# Patient Record
Sex: FEMALE | Race: WHITE | NOT HISPANIC OR LATINO
[De-identification: names, ages, dates, MRNs, and addresses within clinical notes are randomized per-mention and may not be internally consistent; named-entity substitution may affect disease eponyms.]

---

## 2017-01-05 ENCOUNTER — APPOINTMENT (OUTPATIENT)
Dept: PULMONOLOGY | Facility: CLINIC | Age: 42
End: 2017-01-05

## 2017-01-05 VITALS
RESPIRATION RATE: 17 BRPM | WEIGHT: 155 LBS | SYSTOLIC BLOOD PRESSURE: 110 MMHG | HEIGHT: 63 IN | OXYGEN SATURATION: 98 % | DIASTOLIC BLOOD PRESSURE: 60 MMHG | BODY MASS INDEX: 27.46 KG/M2 | TEMPERATURE: 97.7 F | HEART RATE: 94 BPM

## 2017-01-09 ENCOUNTER — RX RENEWAL (OUTPATIENT)
Age: 42
End: 2017-01-09

## 2017-01-09 ENCOUNTER — MEDICATION RENEWAL (OUTPATIENT)
Age: 42
End: 2017-01-09

## 2017-01-13 LAB — BACTERIA SPT CF RESP CULT: ABNORMAL

## 2017-01-23 ENCOUNTER — OTHER (OUTPATIENT)
Age: 42
End: 2017-01-23

## 2017-01-30 LAB — CFTR MUT TESTED BLD/T: NORMAL

## 2017-02-14 ENCOUNTER — MEDICATION RENEWAL (OUTPATIENT)
Age: 42
End: 2017-02-14

## 2017-02-20 LAB — ACID FAST STN SPT: NORMAL

## 2017-06-05 ENCOUNTER — RX RENEWAL (OUTPATIENT)
Age: 42
End: 2017-06-05

## 2017-06-29 ENCOUNTER — APPOINTMENT (OUTPATIENT)
Dept: PULMONOLOGY | Facility: CLINIC | Age: 42
End: 2017-06-29

## 2017-06-29 VITALS
BODY MASS INDEX: 27.29 KG/M2 | HEART RATE: 75 BPM | HEIGHT: 63 IN | SYSTOLIC BLOOD PRESSURE: 100 MMHG | WEIGHT: 154 LBS | DIASTOLIC BLOOD PRESSURE: 68 MMHG | RESPIRATION RATE: 14 BRPM | OXYGEN SATURATION: 98 % | TEMPERATURE: 98.5 F

## 2017-07-05 ENCOUNTER — OTHER (OUTPATIENT)
Age: 42
End: 2017-07-05

## 2017-07-06 ENCOUNTER — RESULT REVIEW (OUTPATIENT)
Age: 42
End: 2017-07-06

## 2017-07-06 LAB — BACTERIA SPT CF RESP CULT: ABNORMAL

## 2017-07-10 ENCOUNTER — OUTPATIENT (OUTPATIENT)
Dept: OUTPATIENT SERVICES | Facility: HOSPITAL | Age: 42
LOS: 1 days | End: 2017-07-10
Payer: COMMERCIAL

## 2017-07-10 ENCOUNTER — APPOINTMENT (OUTPATIENT)
Dept: CT IMAGING | Facility: IMAGING CENTER | Age: 42
End: 2017-07-10

## 2017-07-10 ENCOUNTER — APPOINTMENT (OUTPATIENT)
Dept: RADIOLOGY | Facility: IMAGING CENTER | Age: 42
End: 2017-07-10

## 2017-07-10 DIAGNOSIS — E84.0 CYSTIC FIBROSIS WITH PULMONARY MANIFESTATIONS: ICD-10-CM

## 2017-07-10 DIAGNOSIS — E84.9 CYSTIC FIBROSIS, UNSPECIFIED: ICD-10-CM

## 2017-07-10 PROCEDURE — 77080 DXA BONE DENSITY AXIAL: CPT | Mod: 26

## 2017-07-10 PROCEDURE — 71250 CT THORAX DX C-: CPT | Mod: 26

## 2017-07-11 LAB
GLUCOSE 1H P 100 G GLC PO SERPL-MCNC: 79 MG/DL
GLUCOSE 2H P 100 G GLC PO SERPL-MCNC: 102 MG/DL
GLUCOSE BS SERPL-MCNC: 88 MG/DL

## 2017-07-14 DIAGNOSIS — M85.89 OTHER SPECIFIED DISORDERS OF BONE DENSITY AND STRUCTURE, MULTIPLE SITES: ICD-10-CM

## 2017-07-14 DIAGNOSIS — J47.9 BRONCHIECTASIS, UNCOMPLICATED: ICD-10-CM

## 2017-07-27 ENCOUNTER — APPOINTMENT (OUTPATIENT)
Dept: PULMONOLOGY | Facility: CLINIC | Age: 42
End: 2017-07-27
Payer: COMMERCIAL

## 2017-07-27 VITALS
TEMPERATURE: 97.4 F | WEIGHT: 153 LBS | HEIGHT: 63 IN | DIASTOLIC BLOOD PRESSURE: 70 MMHG | RESPIRATION RATE: 16 BRPM | OXYGEN SATURATION: 98 % | BODY MASS INDEX: 27.11 KG/M2 | HEART RATE: 81 BPM | SYSTOLIC BLOOD PRESSURE: 110 MMHG

## 2017-07-27 PROCEDURE — 99215 OFFICE O/P EST HI 40 MIN: CPT | Mod: 25

## 2017-07-27 PROCEDURE — 94010 BREATHING CAPACITY TEST: CPT

## 2017-07-27 PROCEDURE — ZZZZZ: CPT

## 2017-07-28 LAB
ALBUMIN SERPL ELPH-MCNC: 4.2 G/DL
ALP BLD-CCNC: 69 U/L
ALT SERPL-CCNC: 8 U/L
ANION GAP SERPL CALC-SCNC: 15 MMOL/L
AST SERPL-CCNC: 12 U/L
BILIRUB SERPL-MCNC: 0.2 MG/DL
BUN SERPL-MCNC: 13 MG/DL
CALCIUM SERPL-MCNC: 9.9 MG/DL
CHLORIDE SERPL-SCNC: 104 MMOL/L
CO2 SERPL-SCNC: 22 MMOL/L
CREAT SERPL-MCNC: 0.62 MG/DL
GLUCOSE SERPL-MCNC: 105 MG/DL
POTASSIUM SERPL-SCNC: 4.2 MMOL/L
PROT SERPL-MCNC: 8.3 G/DL
SODIUM SERPL-SCNC: 141 MMOL/L

## 2017-07-31 ENCOUNTER — MEDICATION RENEWAL (OUTPATIENT)
Age: 42
End: 2017-07-31

## 2017-08-21 ENCOUNTER — OTHER (OUTPATIENT)
Age: 42
End: 2017-08-21

## 2017-08-21 LAB — ACID FAST STN SPT: NORMAL

## 2017-08-28 ENCOUNTER — MEDICATION RENEWAL (OUTPATIENT)
Age: 42
End: 2017-08-28

## 2017-08-31 ENCOUNTER — MEDICATION RENEWAL (OUTPATIENT)
Age: 42
End: 2017-08-31

## 2017-09-07 ENCOUNTER — RESULT REVIEW (OUTPATIENT)
Age: 42
End: 2017-09-07

## 2017-09-26 ENCOUNTER — APPOINTMENT (OUTPATIENT)
Dept: PULMONOLOGY | Facility: CLINIC | Age: 42
End: 2017-09-26
Payer: COMMERCIAL

## 2017-09-26 ENCOUNTER — LABORATORY RESULT (OUTPATIENT)
Age: 42
End: 2017-09-26

## 2017-09-26 VITALS
OXYGEN SATURATION: 96 % | BODY MASS INDEX: 26.58 KG/M2 | SYSTOLIC BLOOD PRESSURE: 110 MMHG | TEMPERATURE: 96.7 F | RESPIRATION RATE: 16 BRPM | DIASTOLIC BLOOD PRESSURE: 72 MMHG | HEART RATE: 72 BPM | WEIGHT: 150 LBS | HEIGHT: 63 IN

## 2017-09-26 DIAGNOSIS — Z57.9 OCCUPATIONAL EXPOSURE TO UNSPECIFIED RISK FACTOR: ICD-10-CM

## 2017-09-26 PROCEDURE — 99215 OFFICE O/P EST HI 40 MIN: CPT

## 2017-09-26 SDOH — HEALTH STABILITY - PHYSICAL HEALTH: OCCUPATIONAL EXPOSURE TO UNSPECIFIED RISK FACTOR: Z57.9

## 2017-09-27 PROBLEM — Z57.9 OCCUPATIONAL EXPOSURE IN WORKPLACE: Status: ACTIVE | Noted: 2017-07-27

## 2017-10-06 ENCOUNTER — APPOINTMENT (OUTPATIENT)
Dept: CT IMAGING | Facility: IMAGING CENTER | Age: 42
End: 2017-10-06
Payer: COMMERCIAL

## 2017-10-06 ENCOUNTER — OUTPATIENT (OUTPATIENT)
Dept: OUTPATIENT SERVICES | Facility: HOSPITAL | Age: 42
LOS: 1 days | End: 2017-10-06
Payer: COMMERCIAL

## 2017-10-06 DIAGNOSIS — J32.9 CHRONIC SINUSITIS, UNSPECIFIED: ICD-10-CM

## 2017-10-06 PROCEDURE — 70486 CT MAXILLOFACIAL W/O DYE: CPT | Mod: 26

## 2017-10-10 ENCOUNTER — RESULT REVIEW (OUTPATIENT)
Age: 42
End: 2017-10-10

## 2017-10-10 LAB
25(OH)D3 SERPL-MCNC: 48.6 NG/ML
A FLAVUS AB FLD QL: NEGATIVE
A FUMIGATUS AB FLD QL: NEGATIVE
A FUMIGATUS IGE QN: 9.05 KUA/L
A NIGER AB FLD QL: NEGATIVE
A-TOCOPHEROL VIT E SERPL-MCNC: 11.5 MG/L
ALBUMIN SERPL ELPH-MCNC: 4.4 G/DL
ALP BLD-CCNC: 76 U/L
ALT SERPL-CCNC: <4 U/L
ANION GAP SERPL CALC-SCNC: 16 MMOL/L
ASPERGILLUS FLAVUS PRECIPITINS: NEGATIVE
ASPERGILLUS FUMIGATES PRECIPTINS: NEGATIVE
ASPERGILLUS NIGER PRECIPITINS: NEGATIVE
AST SERPL-CCNC: 15 U/L
BACTERIA SPT CF RESP CULT: ABNORMAL
BASOPHILS # BLD AUTO: 0.04 K/UL
BASOPHILS NFR BLD AUTO: 0.5 %
BETA+GAMMA TOCOPHEROL SERPL-MCNC: 1.7 MG/L
BILIRUB SERPL-MCNC: 0.3 MG/DL
BUN SERPL-MCNC: 7 MG/DL
CALCIUM SERPL-MCNC: 9 MG/DL
CHLORIDE SERPL-SCNC: 101 MMOL/L
CO2 SERPL-SCNC: 23 MMOL/L
CREAT SERPL-MCNC: 0.48 MG/DL
DEPRECATED A FUMIGATUS IGE RAST QL: ABNORMAL
EOSINOPHIL # BLD AUTO: 0.36 K/UL
EOSINOPHIL NFR BLD AUTO: 4.3 %
GLUCOSE SERPL-MCNC: 70 MG/DL
HBA1C MFR BLD HPLC: 5.4 %
HCT VFR BLD CALC: 32.1 %
HGB BLD-MCNC: 9.8 G/DL
IMM GRANULOCYTES NFR BLD AUTO: 0.2 %
LYMPHOCYTES # BLD AUTO: 1.08 K/UL
LYMPHOCYTES NFR BLD AUTO: 12.8 %
MAN DIFF?: NORMAL
MCHC RBC-ENTMCNC: 26 PG
MCHC RBC-ENTMCNC: 30.5 GM/DL
MCV RBC AUTO: 85.1 FL
MENADIONE SERPL-MCNC: 0.44 NG/ML
MONOCYTES # BLD AUTO: 0.72 K/UL
MONOCYTES NFR BLD AUTO: 8.5 %
NEUTROPHILS # BLD AUTO: 6.25 K/UL
NEUTROPHILS NFR BLD AUTO: 73.7 %
PLATELET # BLD AUTO: 426 K/UL
POTASSIUM SERPL-SCNC: 4.1 MMOL/L
PROT SERPL-MCNC: 8.6 G/DL
RBC # BLD: 3.77 M/UL
RBC # FLD: 16 %
SODIUM SERPL-SCNC: 140 MMOL/L
TOTAL IGE SMQN RAST: 238 KU/L
VIT A SERPL-MCNC: 29 UG/DL
WBC # FLD AUTO: 8.47 K/UL

## 2017-10-18 ENCOUNTER — MEDICATION RENEWAL (OUTPATIENT)
Age: 42
End: 2017-10-18

## 2017-10-19 ENCOUNTER — MEDICATION RENEWAL (OUTPATIENT)
Age: 42
End: 2017-10-19

## 2017-10-27 ENCOUNTER — MEDICATION RENEWAL (OUTPATIENT)
Age: 42
End: 2017-10-27

## 2017-10-30 ENCOUNTER — APPOINTMENT (OUTPATIENT)
Dept: PULMONOLOGY | Facility: CLINIC | Age: 42
End: 2017-10-30
Payer: COMMERCIAL

## 2017-10-30 PROCEDURE — 90686 IIV4 VACC NO PRSV 0.5 ML IM: CPT

## 2017-10-30 PROCEDURE — G0008: CPT

## 2017-11-03 ENCOUNTER — APPOINTMENT (OUTPATIENT)
Dept: OTOLARYNGOLOGY | Facility: CLINIC | Age: 42
End: 2017-11-03
Payer: COMMERCIAL

## 2017-11-03 VITALS
BODY MASS INDEX: 25.61 KG/M2 | HEART RATE: 67 BPM | DIASTOLIC BLOOD PRESSURE: 75 MMHG | WEIGHT: 150 LBS | SYSTOLIC BLOOD PRESSURE: 116 MMHG | HEIGHT: 64 IN

## 2017-11-03 PROCEDURE — 99244 OFF/OP CNSLTJ NEW/EST MOD 40: CPT | Mod: 25

## 2017-11-03 PROCEDURE — 31231 NASAL ENDOSCOPY DX: CPT

## 2017-11-03 RX ORDER — SULFAMETHOXAZOLE AND TRIMETHOPRIM 800; 160 MG/1; MG/1
800-160 TABLET ORAL
Qty: 42 | Refills: 0 | Status: DISCONTINUED | COMMUNITY
Start: 2017-06-29 | End: 2017-11-03

## 2017-11-03 RX ORDER — CIPROFLOXACIN HYDROCHLORIDE 750 MG/1
750 TABLET, FILM COATED ORAL
Qty: 28 | Refills: 0 | Status: DISCONTINUED | COMMUNITY
Start: 2017-01-05 | End: 2017-11-03

## 2017-12-05 ENCOUNTER — APPOINTMENT (OUTPATIENT)
Dept: PULMONOLOGY | Facility: CLINIC | Age: 42
End: 2017-12-05

## 2017-12-27 ENCOUNTER — MEDICATION RENEWAL (OUTPATIENT)
Age: 42
End: 2017-12-27

## 2017-12-28 ENCOUNTER — MEDICATION RENEWAL (OUTPATIENT)
Age: 42
End: 2017-12-28

## 2018-01-17 ENCOUNTER — MEDICATION RENEWAL (OUTPATIENT)
Age: 43
End: 2018-01-17

## 2018-02-26 ENCOUNTER — OTHER (OUTPATIENT)
Age: 43
End: 2018-02-26

## 2018-02-26 LAB — ACID FAST STN SPT: NORMAL

## 2018-03-05 ENCOUNTER — RX RENEWAL (OUTPATIENT)
Age: 43
End: 2018-03-05

## 2018-03-27 ENCOUNTER — APPOINTMENT (OUTPATIENT)
Dept: PULMONOLOGY | Facility: CLINIC | Age: 43
End: 2018-03-27
Payer: COMMERCIAL

## 2018-03-27 VITALS
OXYGEN SATURATION: 95 % | SYSTOLIC BLOOD PRESSURE: 110 MMHG | BODY MASS INDEX: 27.31 KG/M2 | WEIGHT: 160 LBS | TEMPERATURE: 98.5 F | RESPIRATION RATE: 18 BRPM | HEART RATE: 78 BPM | DIASTOLIC BLOOD PRESSURE: 70 MMHG | HEIGHT: 64 IN

## 2018-03-27 PROCEDURE — ZZZZZ: CPT

## 2018-03-27 PROCEDURE — 99215 OFFICE O/P EST HI 40 MIN: CPT | Mod: 25

## 2018-03-27 PROCEDURE — 94010 BREATHING CAPACITY TEST: CPT

## 2018-03-27 RX ORDER — PREDNISONE 20 MG/1
20 TABLET ORAL
Qty: 14 | Refills: 0 | Status: DISCONTINUED | COMMUNITY
Start: 2017-06-29 | End: 2018-03-27

## 2018-03-27 RX ORDER — PREDNISONE 20 MG/1
20 TABLET ORAL DAILY
Qty: 30 | Refills: 0 | Status: DISCONTINUED | COMMUNITY
Start: 2017-01-05 | End: 2018-03-27

## 2018-03-27 RX ORDER — SULFAMETHOXAZOLE AND TRIMETHOPRIM 800; 160 MG/1; MG/1
800-160 TABLET ORAL
Qty: 42 | Refills: 0 | Status: DISCONTINUED | COMMUNITY
Start: 2017-09-26 | End: 2018-03-27

## 2018-03-27 RX ORDER — PREDNISONE 20 MG/1
20 TABLET ORAL DAILY
Qty: 5 | Refills: 0 | Status: DISCONTINUED | COMMUNITY
Start: 2017-09-26 | End: 2018-03-27

## 2018-03-30 LAB
ALBUMIN SERPL ELPH-MCNC: 4.4 G/DL
ALP BLD-CCNC: 64 U/L
ALT SERPL-CCNC: 4 U/L
ANA PAT FLD IF-IMP: ABNORMAL
ANA SER IF-ACNC: ABNORMAL
ANION GAP SERPL CALC-SCNC: 19 MMOL/L
AST SERPL-CCNC: 20 U/L
BASOPHILS # BLD AUTO: 0.06 K/UL
BASOPHILS NFR BLD AUTO: 0.8 %
BILIRUB SERPL-MCNC: 0.2 MG/DL
BUN SERPL-MCNC: 8 MG/DL
CALCIUM SERPL-MCNC: 9.5 MG/DL
CHLORIDE SERPL-SCNC: 102 MMOL/L
CO2 SERPL-SCNC: 18 MMOL/L
CREAT SERPL-MCNC: 0.58 MG/DL
EOSINOPHIL # BLD AUTO: 0.35 K/UL
EOSINOPHIL NFR BLD AUTO: 4.4 %
GLUCOSE SERPL-MCNC: 83 MG/DL
HCT VFR BLD CALC: 31 %
HGB BLD-MCNC: 8.9 G/DL
IMM GRANULOCYTES NFR BLD AUTO: 0.3 %
LYMPHOCYTES # BLD AUTO: 2.09 K/UL
LYMPHOCYTES NFR BLD AUTO: 26.3 %
MAN DIFF?: NORMAL
MCHC RBC-ENTMCNC: 22.4 PG
MCHC RBC-ENTMCNC: 28.7 GM/DL
MCV RBC AUTO: 78.1 FL
MONOCYTES # BLD AUTO: 0.79 K/UL
MONOCYTES NFR BLD AUTO: 9.9 %
NEUTROPHILS # BLD AUTO: 4.64 K/UL
NEUTROPHILS NFR BLD AUTO: 58.3 %
PLATELET # BLD AUTO: 390 K/UL
POTASSIUM SERPL-SCNC: 4.1 MMOL/L
PROT SERPL-MCNC: 8.2 G/DL
RBC # BLD: 3.97 M/UL
RBC # FLD: 17.6 %
RHEUMATOID FACT SER QL: <7 IU/ML
SODIUM SERPL-SCNC: 139 MMOL/L
WBC # FLD AUTO: 7.95 K/UL

## 2018-04-16 ENCOUNTER — MEDICATION RENEWAL (OUTPATIENT)
Age: 43
End: 2018-04-16

## 2018-05-17 ENCOUNTER — MEDICATION RENEWAL (OUTPATIENT)
Age: 43
End: 2018-05-17

## 2018-07-16 ENCOUNTER — APPOINTMENT (OUTPATIENT)
Dept: PULMONOLOGY | Facility: CLINIC | Age: 43
End: 2018-07-16
Payer: COMMERCIAL

## 2018-07-16 ENCOUNTER — LABORATORY RESULT (OUTPATIENT)
Age: 43
End: 2018-07-16

## 2018-07-16 VITALS
SYSTOLIC BLOOD PRESSURE: 120 MMHG | RESPIRATION RATE: 18 BRPM | WEIGHT: 153 LBS | HEART RATE: 96 BPM | DIASTOLIC BLOOD PRESSURE: 70 MMHG | BODY MASS INDEX: 26.12 KG/M2 | TEMPERATURE: 98.3 F | HEIGHT: 64 IN | OXYGEN SATURATION: 96 %

## 2018-07-16 PROCEDURE — 99215 OFFICE O/P EST HI 40 MIN: CPT

## 2018-07-16 RX ORDER — AMOXICILLIN AND CLAVULANATE POTASSIUM 562.5; 437.5; 62.5 MG/1; MG/1; MG/1
1000-62.5 TABLET, MULTILAYER, EXTENDED RELEASE ORAL
Qty: 28 | Refills: 0 | Status: COMPLETED | COMMUNITY
Start: 2018-02-06

## 2018-07-31 LAB
25(OH)D3 SERPL-MCNC: 50 NG/ML
A FLAVUS AB FLD QL: NEGATIVE
A FUMIGATUS AB FLD QL: NEGATIVE
A FUMIGATUS IGE QN: 3.88 KUA/L
A NIGER AB FLD QL: NEGATIVE
A-TOCOPHEROL VIT E SERPL-MCNC: 11.3 MG/L
ALBUMIN SERPL ELPH-MCNC: 4.8 G/DL
ALP BLD-CCNC: 69 U/L
ALT SERPL-CCNC: 8 U/L
ANION GAP SERPL CALC-SCNC: 17 MMOL/L
APPEARANCE: CLEAR
ASPERGILLUS FLAVUS PRECIPITINS: NEGATIVE
ASPERGILLUS FUMIGATES PRECIPTINS: NEGATIVE
ASPERGILLUS NIGER PRECIPITINS: NEGATIVE
AST SERPL-CCNC: 21 U/L
BACTERIA SPT CF RESP CULT: ABNORMAL
BASOPHILS # BLD AUTO: 0.13 K/UL
BASOPHILS NFR BLD AUTO: 1.8 %
BETA+GAMMA TOCOPHEROL SERPL-MCNC: 1.4 MG/L
BILIRUB SERPL-MCNC: 0.4 MG/DL
BILIRUBIN URINE: NEGATIVE
BLOOD URINE: NEGATIVE
BUN SERPL-MCNC: 12 MG/DL
CALCIUM SERPL-MCNC: 9.7 MG/DL
CHLORIDE SERPL-SCNC: 98 MMOL/L
CO2 SERPL-SCNC: 24 MMOL/L
COLOR: ABNORMAL
CREAT SERPL-MCNC: 0.59 MG/DL
CREAT SPEC-SCNC: 277 MG/DL
DEPRECATED A FUMIGATUS IGE RAST QL: ABNORMAL
EOSINOPHIL # BLD AUTO: 0.32 K/UL
EOSINOPHIL NFR BLD AUTO: 4.5 %
GLUCOSE 1H P 100 G GLC PO SERPL-MCNC: 85 MG/DL
GLUCOSE 2H P 100 G GLC PO SERPL-MCNC: 86 MG/DL
GLUCOSE BS SERPL-MCNC: 86 MG/DL
GLUCOSE QUALITATIVE U: NEGATIVE MG/DL
GLUCOSE SERPL-MCNC: 86 MG/DL
HBA1C MFR BLD HPLC: 5.3 %
HCT VFR BLD CALC: 30.4 %
HGB BLD-MCNC: 8.6 G/DL
IRON SATN MFR SERPL: 4 %
IRON SERPL-MCNC: 20 UG/DL
KETONES URINE: ABNORMAL
LEUKOCYTE ESTERASE URINE: ABNORMAL
LYMPHOCYTES # BLD AUTO: 1.27 K/UL
LYMPHOCYTES NFR BLD AUTO: 18 %
MAN DIFF?: NORMAL
MCHC RBC-ENTMCNC: 20.9 PG
MCHC RBC-ENTMCNC: 28.3 GM/DL
MCV RBC AUTO: 73.8 FL
MENADIONE SERPL-MCNC: 0.28 NG/ML
MICROALBUMIN 24H UR DL<=1MG/L-MCNC: 3.9 MG/DL
MICROALBUMIN/CREAT 24H UR-RTO: 14 MG/G
MONOCYTES # BLD AUTO: 0.44 K/UL
MONOCYTES NFR BLD AUTO: 6.3 %
NEUTROPHILS # BLD AUTO: 4.9 K/UL
NEUTROPHILS NFR BLD AUTO: 69.4 %
NITRITE URINE: NEGATIVE
PH URINE: 5.5
PLATELET # BLD AUTO: 424 K/UL
POTASSIUM SERPL-SCNC: 4.3 MMOL/L
PROT SERPL-MCNC: 8.3 G/DL
PROTEIN URINE: ABNORMAL MG/DL
RBC # BLD: 4.12 M/UL
RBC # FLD: 18.4 %
SODIUM SERPL-SCNC: 139 MMOL/L
SPECIFIC GRAVITY URINE: 1.03
TIBC SERPL-MCNC: 520 UG/DL
TOTAL IGE SMQN RAST: 142 KU/L
UIBC SERPL-MCNC: 500 UG/DL
UROBILINOGEN URINE: 1 MG/DL
VIT A SERPL-MCNC: 28.6 UG/DL
WBC # FLD AUTO: 7.06 K/UL

## 2018-08-16 ENCOUNTER — APPOINTMENT (OUTPATIENT)
Dept: PULMONOLOGY | Facility: CLINIC | Age: 43
End: 2018-08-16
Payer: COMMERCIAL

## 2018-08-16 VITALS
TEMPERATURE: 97.3 F | BODY MASS INDEX: 31.8 KG/M2 | SYSTOLIC BLOOD PRESSURE: 110 MMHG | HEART RATE: 84 BPM | DIASTOLIC BLOOD PRESSURE: 60 MMHG | WEIGHT: 162 LBS | HEIGHT: 60 IN | RESPIRATION RATE: 18 BRPM | OXYGEN SATURATION: 97 %

## 2018-08-16 PROCEDURE — 99215 OFFICE O/P EST HI 40 MIN: CPT | Mod: 25

## 2018-08-16 PROCEDURE — ZZZZZ: CPT

## 2018-08-16 PROCEDURE — 94010 BREATHING CAPACITY TEST: CPT

## 2018-08-29 ENCOUNTER — APPOINTMENT (OUTPATIENT)
Dept: PEDIATRIC ALLERGY IMMUNOLOGY | Facility: CLINIC | Age: 43
End: 2018-08-29
Payer: COMMERCIAL

## 2018-08-29 VITALS
HEART RATE: 83 BPM | BODY MASS INDEX: 31.8 KG/M2 | HEIGHT: 60 IN | WEIGHT: 162 LBS | OXYGEN SATURATION: 99 % | DIASTOLIC BLOOD PRESSURE: 76 MMHG | SYSTOLIC BLOOD PRESSURE: 127 MMHG

## 2018-08-29 PROCEDURE — 99244 OFF/OP CNSLTJ NEW/EST MOD 40: CPT

## 2018-09-04 ENCOUNTER — MEDICATION RENEWAL (OUTPATIENT)
Age: 43
End: 2018-09-04

## 2018-09-05 LAB — RHODAMINE-AURAMINE STN SPEC: NORMAL

## 2018-09-07 ENCOUNTER — APPOINTMENT (OUTPATIENT)
Dept: PEDIATRIC ALLERGY IMMUNOLOGY | Facility: CLINIC | Age: 43
End: 2018-09-07
Payer: COMMERCIAL

## 2018-09-07 VITALS — OXYGEN SATURATION: 96 % | SYSTOLIC BLOOD PRESSURE: 119 MMHG | HEART RATE: 76 BPM | DIASTOLIC BLOOD PRESSURE: 79 MMHG

## 2018-09-07 DIAGNOSIS — J30.2 OTHER ALLERGIC RHINITIS: ICD-10-CM

## 2018-09-07 DIAGNOSIS — T50.905D ADVERSE EFFECT OF UNSPECIFIED DRUGS, MEDICAMENTS AND BIOLOGICAL SUBSTANCES, SUBSEQUENT ENCOUNTER: ICD-10-CM

## 2018-09-07 DIAGNOSIS — J30.89 OTHER ALLERGIC RHINITIS: ICD-10-CM

## 2018-09-07 PROCEDURE — 95004 PERQ TESTS W/ALRGNC XTRCS: CPT

## 2018-09-07 PROCEDURE — 99214 OFFICE O/P EST MOD 30 MIN: CPT | Mod: 25

## 2018-09-07 PROCEDURE — 95018 ALL TSTG PERQ&IQ DRUGS/BIOL: CPT

## 2018-09-27 ENCOUNTER — MEDICATION RENEWAL (OUTPATIENT)
Age: 43
End: 2018-09-27

## 2018-10-11 ENCOUNTER — MEDICATION RENEWAL (OUTPATIENT)
Age: 43
End: 2018-10-11

## 2018-10-12 ENCOUNTER — MEDICATION RENEWAL (OUTPATIENT)
Age: 43
End: 2018-10-12

## 2018-10-15 ENCOUNTER — MEDICATION RENEWAL (OUTPATIENT)
Age: 43
End: 2018-10-15

## 2018-11-08 ENCOUNTER — RX RENEWAL (OUTPATIENT)
Age: 43
End: 2018-11-08

## 2018-11-25 ENCOUNTER — FORM ENCOUNTER (OUTPATIENT)
Age: 43
End: 2018-11-25

## 2018-11-26 ENCOUNTER — MOBILE ON CALL (OUTPATIENT)
Age: 43
End: 2018-11-26

## 2018-11-26 ENCOUNTER — APPOINTMENT (OUTPATIENT)
Dept: PULMONOLOGY | Facility: CLINIC | Age: 43
End: 2018-11-26
Payer: COMMERCIAL

## 2018-11-26 ENCOUNTER — APPOINTMENT (OUTPATIENT)
Dept: RADIOLOGY | Facility: IMAGING CENTER | Age: 43
End: 2018-11-26
Payer: COMMERCIAL

## 2018-11-26 ENCOUNTER — OUTPATIENT (OUTPATIENT)
Dept: OUTPATIENT SERVICES | Facility: HOSPITAL | Age: 43
LOS: 1 days | End: 2018-11-26
Payer: COMMERCIAL

## 2018-11-26 VITALS
RESPIRATION RATE: 16 BRPM | OXYGEN SATURATION: 97 % | DIASTOLIC BLOOD PRESSURE: 89 MMHG | BODY MASS INDEX: 31.41 KG/M2 | HEIGHT: 60 IN | WEIGHT: 160 LBS | SYSTOLIC BLOOD PRESSURE: 132 MMHG | TEMPERATURE: 98 F | HEART RATE: 81 BPM

## 2018-11-26 DIAGNOSIS — E84.9 CYSTIC FIBROSIS, UNSPECIFIED: ICD-10-CM

## 2018-11-26 DIAGNOSIS — E84.0 CYSTIC FIBROSIS WITH PULMONARY MANIFESTATIONS: ICD-10-CM

## 2018-11-26 PROCEDURE — 99215 OFFICE O/P EST HI 40 MIN: CPT

## 2018-11-26 PROCEDURE — 71047 X-RAY EXAM CHEST 3 VIEWS: CPT | Mod: 26

## 2018-11-26 PROCEDURE — 71047 X-RAY EXAM CHEST 3 VIEWS: CPT

## 2018-11-26 RX ORDER — SULFAMETHOXAZOLE AND TRIMETHOPRIM 800; 160 MG/1; MG/1
800-160 TABLET ORAL
Qty: 63 | Refills: 0 | Status: DISCONTINUED | COMMUNITY
Start: 2017-01-05 | End: 2018-11-26

## 2018-11-26 RX ORDER — PREDNISONE 20 MG/1
20 TABLET ORAL DAILY
Qty: 7 | Refills: 0 | Status: DISCONTINUED | COMMUNITY
Start: 2018-07-16 | End: 2018-11-26

## 2018-11-27 ENCOUNTER — FORM ENCOUNTER (OUTPATIENT)
Age: 43
End: 2018-11-27

## 2018-11-28 ENCOUNTER — OUTPATIENT (OUTPATIENT)
Dept: OUTPATIENT SERVICES | Facility: HOSPITAL | Age: 43
LOS: 1 days | End: 2018-11-28
Payer: COMMERCIAL

## 2018-11-28 DIAGNOSIS — E84.9 CYSTIC FIBROSIS, UNSPECIFIED: ICD-10-CM

## 2018-11-28 PROCEDURE — 77001 FLUOROGUIDE FOR VEIN DEVICE: CPT | Mod: 26,GC

## 2018-11-28 PROCEDURE — 36569 INSJ PICC 5 YR+ W/O IMAGING: CPT

## 2018-11-28 PROCEDURE — 76937 US GUIDE VASCULAR ACCESS: CPT | Mod: 26

## 2018-12-03 ENCOUNTER — MEDICATION RENEWAL (OUTPATIENT)
Age: 43
End: 2018-12-03

## 2018-12-06 ENCOUNTER — FORM ENCOUNTER (OUTPATIENT)
Age: 43
End: 2018-12-06

## 2018-12-07 ENCOUNTER — OUTPATIENT (OUTPATIENT)
Dept: OUTPATIENT SERVICES | Facility: HOSPITAL | Age: 43
LOS: 1 days | End: 2018-12-07
Payer: COMMERCIAL

## 2018-12-07 DIAGNOSIS — E84.9 CYSTIC FIBROSIS, UNSPECIFIED: ICD-10-CM

## 2018-12-07 DIAGNOSIS — Z45.2 ENCOUNTER FOR ADJUSTMENT AND MANAGEMENT OF VASCULAR ACCESS DEVICE: ICD-10-CM

## 2018-12-07 PROCEDURE — 76937 US GUIDE VASCULAR ACCESS: CPT | Mod: 26

## 2018-12-07 PROCEDURE — 36410 VNPNXR 3YR/> PHY/QHP DX/THER: CPT

## 2018-12-08 DIAGNOSIS — E84.9 CYSTIC FIBROSIS, UNSPECIFIED: ICD-10-CM

## 2018-12-08 DIAGNOSIS — Z45.2 ENCOUNTER FOR ADJUSTMENT AND MANAGEMENT OF VASCULAR ACCESS DEVICE: ICD-10-CM

## 2018-12-10 ENCOUNTER — RX RENEWAL (OUTPATIENT)
Age: 43
End: 2018-12-10

## 2018-12-11 ENCOUNTER — APPOINTMENT (OUTPATIENT)
Dept: PULMONOLOGY | Facility: CLINIC | Age: 43
End: 2018-12-11
Payer: COMMERCIAL

## 2018-12-11 VITALS
HEART RATE: 76 BPM | HEIGHT: 60 IN | OXYGEN SATURATION: 99 % | WEIGHT: 165 LBS | RESPIRATION RATE: 16 BRPM | DIASTOLIC BLOOD PRESSURE: 79 MMHG | SYSTOLIC BLOOD PRESSURE: 116 MMHG | BODY MASS INDEX: 32.39 KG/M2 | TEMPERATURE: 98.5 F

## 2018-12-11 PROCEDURE — 94010 BREATHING CAPACITY TEST: CPT

## 2018-12-11 PROCEDURE — ZZZZZ: CPT

## 2018-12-11 PROCEDURE — 99215 OFFICE O/P EST HI 40 MIN: CPT | Mod: 25

## 2018-12-13 ENCOUNTER — MEDICATION RENEWAL (OUTPATIENT)
Age: 43
End: 2018-12-13

## 2018-12-13 ENCOUNTER — FORM ENCOUNTER (OUTPATIENT)
Age: 43
End: 2018-12-13

## 2018-12-14 ENCOUNTER — OUTPATIENT (OUTPATIENT)
Dept: OUTPATIENT SERVICES | Facility: HOSPITAL | Age: 43
LOS: 1 days | End: 2018-12-14
Payer: COMMERCIAL

## 2018-12-14 ENCOUNTER — INPATIENT (INPATIENT)
Facility: HOSPITAL | Age: 43
LOS: 3 days | Discharge: HOME HEALTH | End: 2018-12-18
Attending: INTERNAL MEDICINE | Admitting: INTERNAL MEDICINE
Payer: COMMERCIAL

## 2018-12-14 VITALS
HEIGHT: 61 IN | HEART RATE: 96 BPM | DIASTOLIC BLOOD PRESSURE: 76 MMHG | RESPIRATION RATE: 18 BRPM | SYSTOLIC BLOOD PRESSURE: 110 MMHG | TEMPERATURE: 98 F | OXYGEN SATURATION: 98 % | WEIGHT: 166.45 LBS

## 2018-12-14 DIAGNOSIS — Z29.9 ENCOUNTER FOR PROPHYLACTIC MEASURES, UNSPECIFIED: ICD-10-CM

## 2018-12-14 DIAGNOSIS — E84.9 CYSTIC FIBROSIS, UNSPECIFIED: ICD-10-CM

## 2018-12-14 DIAGNOSIS — E84.0 CYSTIC FIBROSIS WITH PULMONARY MANIFESTATIONS: ICD-10-CM

## 2018-12-14 LAB
ALBUMIN SERPL ELPH-MCNC: 4.2 G/DL — SIGNIFICANT CHANGE UP (ref 3.3–5)
ALP SERPL-CCNC: 60 U/L — SIGNIFICANT CHANGE UP (ref 40–120)
ALT FLD-CCNC: 13 U/L — SIGNIFICANT CHANGE UP (ref 4–33)
APPEARANCE UR: CLEAR — SIGNIFICANT CHANGE UP
AST SERPL-CCNC: 17 U/L — SIGNIFICANT CHANGE UP (ref 4–32)
BASOPHILS # BLD AUTO: 0.08 K/UL — SIGNIFICANT CHANGE UP (ref 0–0.2)
BASOPHILS NFR BLD AUTO: 0.9 % — SIGNIFICANT CHANGE UP (ref 0–2)
BILIRUB SERPL-MCNC: 0.4 MG/DL — SIGNIFICANT CHANGE UP (ref 0.2–1.2)
BILIRUB UR-MCNC: NEGATIVE — SIGNIFICANT CHANGE UP
BLOOD UR QL VISUAL: NEGATIVE — SIGNIFICANT CHANGE UP
BUN SERPL-MCNC: 13 MG/DL — SIGNIFICANT CHANGE UP (ref 7–23)
CALCIUM SERPL-MCNC: 9.2 MG/DL — SIGNIFICANT CHANGE UP (ref 8.4–10.5)
CHLORIDE SERPL-SCNC: 101 MMOL/L — SIGNIFICANT CHANGE UP (ref 98–107)
CO2 SERPL-SCNC: 23 MMOL/L — SIGNIFICANT CHANGE UP (ref 22–31)
COLOR SPEC: SIGNIFICANT CHANGE UP
CREAT SERPL-MCNC: 0.61 MG/DL — SIGNIFICANT CHANGE UP (ref 0.5–1.3)
EOSINOPHIL # BLD AUTO: 0.35 K/UL — SIGNIFICANT CHANGE UP (ref 0–0.5)
EOSINOPHIL NFR BLD AUTO: 3.8 % — SIGNIFICANT CHANGE UP (ref 0–6)
GLUCOSE SERPL-MCNC: 84 MG/DL — SIGNIFICANT CHANGE UP (ref 70–99)
GLUCOSE UR-MCNC: NEGATIVE — SIGNIFICANT CHANGE UP
HCG UR-SCNC: NEGATIVE — SIGNIFICANT CHANGE UP
HCT VFR BLD CALC: 38.3 % — SIGNIFICANT CHANGE UP (ref 34.5–45)
HGB BLD-MCNC: 12.1 G/DL — SIGNIFICANT CHANGE UP (ref 11.5–15.5)
IMM GRANULOCYTES # BLD AUTO: 0.03 # — SIGNIFICANT CHANGE UP
IMM GRANULOCYTES NFR BLD AUTO: 0.3 % — SIGNIFICANT CHANGE UP (ref 0–1.5)
KETONES UR-MCNC: NEGATIVE — SIGNIFICANT CHANGE UP
LEUKOCYTE ESTERASE UR-ACNC: NEGATIVE — SIGNIFICANT CHANGE UP
LYMPHOCYTES # BLD AUTO: 1.87 K/UL — SIGNIFICANT CHANGE UP (ref 1–3.3)
LYMPHOCYTES # BLD AUTO: 20.1 % — SIGNIFICANT CHANGE UP (ref 13–44)
MCHC RBC-ENTMCNC: 29.4 PG — SIGNIFICANT CHANGE UP (ref 27–34)
MCHC RBC-ENTMCNC: 31.6 % — LOW (ref 32–36)
MCV RBC AUTO: 93.2 FL — SIGNIFICANT CHANGE UP (ref 80–100)
MONOCYTES # BLD AUTO: 1 K/UL — HIGH (ref 0–0.9)
MONOCYTES NFR BLD AUTO: 10.8 % — SIGNIFICANT CHANGE UP (ref 2–14)
NEUTROPHILS # BLD AUTO: 5.96 K/UL — SIGNIFICANT CHANGE UP (ref 1.8–7.4)
NEUTROPHILS NFR BLD AUTO: 64.1 % — SIGNIFICANT CHANGE UP (ref 43–77)
NITRITE UR-MCNC: NEGATIVE — SIGNIFICANT CHANGE UP
NRBC # FLD: 0 — SIGNIFICANT CHANGE UP
PH UR: 6.5 — SIGNIFICANT CHANGE UP (ref 5–8)
PLATELET # BLD AUTO: 337 K/UL — SIGNIFICANT CHANGE UP (ref 150–400)
PMV BLD: 9.4 FL — SIGNIFICANT CHANGE UP (ref 7–13)
POTASSIUM SERPL-MCNC: 3.8 MMOL/L — SIGNIFICANT CHANGE UP (ref 3.5–5.3)
POTASSIUM SERPL-SCNC: 3.8 MMOL/L — SIGNIFICANT CHANGE UP (ref 3.5–5.3)
PROT SERPL-MCNC: 7.8 G/DL — SIGNIFICANT CHANGE UP (ref 6–8.3)
PROT UR-MCNC: NEGATIVE — SIGNIFICANT CHANGE UP
RBC # BLD: 4.11 M/UL — SIGNIFICANT CHANGE UP (ref 3.8–5.2)
RBC # FLD: 13.3 % — SIGNIFICANT CHANGE UP (ref 10.3–14.5)
SODIUM SERPL-SCNC: 137 MMOL/L — SIGNIFICANT CHANGE UP (ref 135–145)
SP GR SPEC: 1.01 — SIGNIFICANT CHANGE UP (ref 1–1.04)
UROBILINOGEN FLD QL: NORMAL — SIGNIFICANT CHANGE UP
WBC # BLD: 9.29 K/UL — SIGNIFICANT CHANGE UP (ref 3.8–10.5)
WBC # FLD AUTO: 9.29 K/UL — SIGNIFICANT CHANGE UP (ref 3.8–10.5)

## 2018-12-14 PROCEDURE — 71045 X-RAY EXAM CHEST 1 VIEW: CPT | Mod: 26

## 2018-12-14 PROCEDURE — 77001 FLUOROGUIDE FOR VEIN DEVICE: CPT

## 2018-12-14 PROCEDURE — 76937 US GUIDE VASCULAR ACCESS: CPT

## 2018-12-14 PROCEDURE — 36569 INSJ PICC 5 YR+ W/O IMAGING: CPT

## 2018-12-14 PROCEDURE — 76937 US GUIDE VASCULAR ACCESS: CPT | Mod: 26

## 2018-12-14 PROCEDURE — 77001 FLUOROGUIDE FOR VEIN DEVICE: CPT | Mod: 26

## 2018-12-14 PROCEDURE — C1751: CPT

## 2018-12-14 RX ORDER — ALBUTEROL 90 UG/1
2 AEROSOL, METERED ORAL EVERY 6 HOURS
Qty: 0 | Refills: 0 | Status: DISCONTINUED | OUTPATIENT
Start: 2018-12-14 | End: 2018-12-14

## 2018-12-14 RX ORDER — CHOLECALCIFEROL (VITAMIN D3) 125 MCG
3000 CAPSULE ORAL DAILY
Qty: 0 | Refills: 0 | Status: DISCONTINUED | OUTPATIENT
Start: 2018-12-14 | End: 2018-12-18

## 2018-12-14 RX ORDER — IMIPENEM AND CILASTATIN 250; 250 MG/100ML; MG/100ML
500 INJECTION, POWDER, FOR SOLUTION INTRAVENOUS ONCE
Qty: 0 | Refills: 0 | Status: COMPLETED | OUTPATIENT
Start: 2018-12-14 | End: 2018-12-15

## 2018-12-14 RX ORDER — IMIPENEM AND CILASTATIN 250; 250 MG/100ML; MG/100ML
500 INJECTION, POWDER, FOR SOLUTION INTRAVENOUS EVERY 6 HOURS
Qty: 0 | Refills: 0 | Status: DISCONTINUED | OUTPATIENT
Start: 2018-12-15 | End: 2018-12-18

## 2018-12-14 RX ORDER — ALBUTEROL 90 UG/1
2.5 AEROSOL, METERED ORAL EVERY 8 HOURS
Qty: 0 | Refills: 0 | Status: DISCONTINUED | OUTPATIENT
Start: 2018-12-14 | End: 2018-12-18

## 2018-12-14 RX ORDER — SODIUM CHLORIDE 9 MG/ML
15 INJECTION INTRAMUSCULAR; INTRAVENOUS; SUBCUTANEOUS
Qty: 0 | Refills: 0 | Status: DISCONTINUED | OUTPATIENT
Start: 2018-12-14 | End: 2018-12-18

## 2018-12-14 RX ORDER — DORNASE ALFA 1 MG/ML
2.5 SOLUTION RESPIRATORY (INHALATION)
Qty: 0 | Refills: 0 | Status: DISCONTINUED | OUTPATIENT
Start: 2018-12-14 | End: 2018-12-18

## 2018-12-14 RX ORDER — FLUTICASONE PROPIONATE 50 MCG
1 SPRAY, SUSPENSION NASAL
Qty: 0 | Refills: 0 | Status: DISCONTINUED | OUTPATIENT
Start: 2018-12-14 | End: 2018-12-18

## 2018-12-14 RX ORDER — IMIPENEM AND CILASTATIN 250; 250 MG/100ML; MG/100ML
INJECTION, POWDER, FOR SOLUTION INTRAVENOUS
Qty: 0 | Refills: 0 | Status: DISCONTINUED | OUTPATIENT
Start: 2018-12-15 | End: 2018-12-18

## 2018-12-14 RX ORDER — COLISTIMETHATE SODIUM 150 MG/2ML
75 INJECTION INTRAMUSCULAR; INTRAVENOUS THREE TIMES A DAY
Qty: 0 | Refills: 0 | Status: DISCONTINUED | OUTPATIENT
Start: 2018-12-14 | End: 2018-12-14

## 2018-12-14 RX ORDER — ALBUTEROL 90 UG/1
2 AEROSOL, METERED ORAL EVERY 8 HOURS
Qty: 0 | Refills: 0 | Status: DISCONTINUED | OUTPATIENT
Start: 2018-12-14 | End: 2018-12-14

## 2018-12-14 RX ORDER — LORATADINE 10 MG/1
10 TABLET ORAL DAILY
Qty: 0 | Refills: 0 | Status: DISCONTINUED | OUTPATIENT
Start: 2018-12-14 | End: 2018-12-18

## 2018-12-14 RX ORDER — COLISTIMETHATE SODIUM 150 MG/2ML
1000 INJECTION INTRAMUSCULAR; INTRAVENOUS
Qty: 0 | Refills: 0 | Status: DISCONTINUED | OUTPATIENT
Start: 2018-12-14 | End: 2018-12-14

## 2018-12-14 RX ORDER — BUDESONIDE AND FORMOTEROL FUMARATE DIHYDRATE 160; 4.5 UG/1; UG/1
2 AEROSOL RESPIRATORY (INHALATION)
Qty: 0 | Refills: 0 | Status: DISCONTINUED | OUTPATIENT
Start: 2018-12-14 | End: 2018-12-18

## 2018-12-14 RX ORDER — COLISTIMETHATE SODIUM 150 MG/2ML
150 INJECTION INTRAMUSCULAR; INTRAVENOUS
Qty: 0 | Refills: 0 | Status: DISCONTINUED | OUTPATIENT
Start: 2018-12-14 | End: 2018-12-18

## 2018-12-14 RX ADMIN — DORNASE ALFA 2.5 MILLIGRAM(S): 1 SOLUTION RESPIRATORY (INHALATION) at 21:35

## 2018-12-14 RX ADMIN — Medication 3000 UNIT(S): at 22:29

## 2018-12-14 RX ADMIN — SODIUM CHLORIDE 15 MILLILITER(S): 9 INJECTION INTRAMUSCULAR; INTRAVENOUS; SUBCUTANEOUS at 21:40

## 2018-12-14 RX ADMIN — ALBUTEROL 2.5 MILLIGRAM(S): 90 AEROSOL, METERED ORAL at 22:01

## 2018-12-14 RX ADMIN — COLISTIMETHATE SODIUM 100 MILLIGRAM(S): 150 INJECTION INTRAMUSCULAR; INTRAVENOUS at 22:30

## 2018-12-14 NOTE — H&P ADULT - ASSESSMENT
43F PM Cystic fibrosis dx'ed at 23 y/o, presenting from outpatient Pulmonologist for CF exacerbation on Day 12/14 of Meropenem and Colistin.

## 2018-12-14 NOTE — H&P ADULT - HISTORY OF PRESENT ILLNESS
43F PMH Cystic fibrosis dx'ed at 21 y/o, presenting from outpatient Pulmonologist currently receiving treatment for CF exacerbation w Meropenem and Colistin day 12/14. NAVNEET is a 43F with CF diagnosed at age 22 presenting for IV antibiotics in the setting of a 2 week CF exacerbation. During a trip to [place] 2 weeks ago the pt developed wheezing and CARDENAS associated with a mild cough productive of yellow sputum. A CXR on 11/27 showed no infiltrates, but she was started on colistin 100mg IV q12h and meropenem 500 IV Q8h with a PICC line as an outpatient. On day 5 of 14 she developed a pustular, erythematous rash with yellow discharge from the PICC site, which was removed for fear of infection. Numerous attempts at access with midline and peripheral IV failed, so she was admitted for colistin 100mg IV q12 and imipenem 1gm IV q6 per her CF specialist Dr. Hollis. Of note, she received a dose of imipenem on 12/11 that caused some unilateral numbness and tingling of her R hand as well as nausea with an episode of vomiting, which improved with her second dose. During this exacerbation she has had no f/c, CP, dysuria, steatorrhea/diarrhea. She endorses mild, diffuse abdominal pain due to constipation, some dull back pain on the R that is not pleuritic in nature, and a yeast infection managed with diflucan. She feels that her symptoms have improved since starting the antibiotics. This is her 4th exacerbation, with the last one in 2013, and first time requiring hospitalization.

## 2018-12-14 NOTE — H&P ADULT - PROBLEM SELECTOR PLAN 3
Diet: Regular as per patient no pancreatic insufficiency and does not take pancreatic enzymes    DVT PPx: No need (IMPROVE = 0)

## 2018-12-14 NOTE — H&P ADULT - NSHPSOCIALHISTORY_GEN_ALL_CORE
Lives at home with . Has one daughter. Works as a . Denies smoking history. Social EtOH use. Denies recreational drug use.

## 2018-12-14 NOTE — H&P ADULT - RS GEN PE MLT RESP DETAILS PC
respirations non-labored/good air movement/no rhonchi/no wheezes/breath sounds equal/no chest wall tenderness/no rales/clear to auscultation bilaterally/airway patent

## 2018-12-14 NOTE — H&P ADULT - PROBLEM SELECTOR PLAN 1
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 Past history of MSSA and actrobacter  (7/16/18) and E coli (1/5/17). Recently MAC neg on AFB (7/16/18). Has been on IV Colistin 100mg BID and Meropenem 500mg Q8H. No fevers but continues to have yellow sputum.  - As per outpatient pulmonologist will give IV colistin 150mg BID x 2 weeks and IV imipenem Q8H x 2 weeks  - Continue pulmozyme and 3% hypertonic saline BID   - Continue Advair  - ACT ([place] vest and Aerobika) TID  - ID Consult in the AM  - Sputum culture (call lab tomorrow that it is a CF sputum)  - f/u labs (CBC, CMP)  - f/u blood cultures

## 2018-12-14 NOTE — H&P ADULT - MUSCULOSKELETAL
details… detailed exam no joint swelling/no joint erythema/no calf tenderness/ROM intact/normal strength

## 2018-12-14 NOTE — H&P ADULT - FAMILY HISTORY
Sibling  Still living? Unknown  Family history of cystic fibrosis, Age at diagnosis: Age Unknown     Father  Still living? Unknown  Family history of diabetes mellitus, Age at diagnosis: Age Unknown

## 2018-12-15 LAB
ALBUMIN SERPL ELPH-MCNC: 4.1 G/DL — SIGNIFICANT CHANGE UP (ref 3.3–5)
ALP SERPL-CCNC: 61 U/L — SIGNIFICANT CHANGE UP (ref 40–120)
ALT FLD-CCNC: 13 U/L — SIGNIFICANT CHANGE UP (ref 4–33)
APTT BLD: 33.9 SEC — SIGNIFICANT CHANGE UP (ref 27.5–36.3)
AST SERPL-CCNC: 14 U/L — SIGNIFICANT CHANGE UP (ref 4–32)
BILIRUB SERPL-MCNC: 0.6 MG/DL — SIGNIFICANT CHANGE UP (ref 0.2–1.2)
BUN SERPL-MCNC: 10 MG/DL — SIGNIFICANT CHANGE UP (ref 7–23)
CALCIUM SERPL-MCNC: 8.9 MG/DL — SIGNIFICANT CHANGE UP (ref 8.4–10.5)
CHLORIDE SERPL-SCNC: 102 MMOL/L — SIGNIFICANT CHANGE UP (ref 98–107)
CO2 SERPL-SCNC: 23 MMOL/L — SIGNIFICANT CHANGE UP (ref 22–31)
CREAT SERPL-MCNC: 0.46 MG/DL — LOW (ref 0.5–1.3)
GLUCOSE SERPL-MCNC: 90 MG/DL — SIGNIFICANT CHANGE UP (ref 70–99)
GRAM STN SPT: SIGNIFICANT CHANGE UP
GRAM STN SPT: SIGNIFICANT CHANGE UP
HCT VFR BLD CALC: 37.3 % — SIGNIFICANT CHANGE UP (ref 34.5–45)
HGB BLD-MCNC: 11.7 G/DL — SIGNIFICANT CHANGE UP (ref 11.5–15.5)
INR BLD: 1.06 — SIGNIFICANT CHANGE UP (ref 0.88–1.17)
MAGNESIUM SERPL-MCNC: 2 MG/DL — SIGNIFICANT CHANGE UP (ref 1.6–2.6)
MCHC RBC-ENTMCNC: 29 PG — SIGNIFICANT CHANGE UP (ref 27–34)
MCHC RBC-ENTMCNC: 31.4 % — LOW (ref 32–36)
MCV RBC AUTO: 92.3 FL — SIGNIFICANT CHANGE UP (ref 80–100)
NRBC # FLD: 0 — SIGNIFICANT CHANGE UP
PHOSPHATE SERPL-MCNC: 2.9 MG/DL — SIGNIFICANT CHANGE UP (ref 2.5–4.5)
PLATELET # BLD AUTO: 336 K/UL — SIGNIFICANT CHANGE UP (ref 150–400)
PMV BLD: 9.5 FL — SIGNIFICANT CHANGE UP (ref 7–13)
POTASSIUM SERPL-MCNC: 4.2 MMOL/L — SIGNIFICANT CHANGE UP (ref 3.5–5.3)
POTASSIUM SERPL-SCNC: 4.2 MMOL/L — SIGNIFICANT CHANGE UP (ref 3.5–5.3)
PROT SERPL-MCNC: 7.5 G/DL — SIGNIFICANT CHANGE UP (ref 6–8.3)
PROTHROM AB SERPL-ACNC: 12.1 SEC — SIGNIFICANT CHANGE UP (ref 9.8–13.1)
RBC # BLD: 4.04 M/UL — SIGNIFICANT CHANGE UP (ref 3.8–5.2)
RBC # FLD: 13.4 % — SIGNIFICANT CHANGE UP (ref 10.3–14.5)
SODIUM SERPL-SCNC: 137 MMOL/L — SIGNIFICANT CHANGE UP (ref 135–145)
SPECIMEN SOURCE: SIGNIFICANT CHANGE UP
WBC # BLD: 6.83 K/UL — SIGNIFICANT CHANGE UP (ref 3.8–10.5)
WBC # FLD AUTO: 6.83 K/UL — SIGNIFICANT CHANGE UP (ref 3.8–10.5)

## 2018-12-15 PROCEDURE — 99223 1ST HOSP IP/OBS HIGH 75: CPT | Mod: GC

## 2018-12-15 PROCEDURE — 71045 X-RAY EXAM CHEST 1 VIEW: CPT | Mod: 26

## 2018-12-15 PROCEDURE — 99254 IP/OBS CNSLTJ NEW/EST MOD 60: CPT | Mod: GC

## 2018-12-15 RX ORDER — PANTOPRAZOLE SODIUM 20 MG/1
40 TABLET, DELAYED RELEASE ORAL
Qty: 0 | Refills: 0 | Status: DISCONTINUED | OUTPATIENT
Start: 2018-12-15 | End: 2018-12-18

## 2018-12-15 RX ADMIN — IMIPENEM AND CILASTATIN 100 MILLIGRAM(S): 250; 250 INJECTION, POWDER, FOR SOLUTION INTRAVENOUS at 02:55

## 2018-12-15 RX ADMIN — DORNASE ALFA 2.5 MILLIGRAM(S): 1 SOLUTION RESPIRATORY (INHALATION) at 23:02

## 2018-12-15 RX ADMIN — ALBUTEROL 2.5 MILLIGRAM(S): 90 AEROSOL, METERED ORAL at 07:35

## 2018-12-15 RX ADMIN — IMIPENEM AND CILASTATIN 100 MILLIGRAM(S): 250; 250 INJECTION, POWDER, FOR SOLUTION INTRAVENOUS at 14:52

## 2018-12-15 RX ADMIN — SODIUM CHLORIDE 4 MILLILITER(S): 9 INJECTION INTRAMUSCULAR; INTRAVENOUS; SUBCUTANEOUS at 07:50

## 2018-12-15 RX ADMIN — COLISTIMETHATE SODIUM 100 MILLIGRAM(S): 150 INJECTION INTRAMUSCULAR; INTRAVENOUS at 10:29

## 2018-12-15 RX ADMIN — IMIPENEM AND CILASTATIN 100 MILLIGRAM(S): 250; 250 INJECTION, POWDER, FOR SOLUTION INTRAVENOUS at 09:26

## 2018-12-15 RX ADMIN — ALBUTEROL 2.5 MILLIGRAM(S): 90 AEROSOL, METERED ORAL at 14:43

## 2018-12-15 RX ADMIN — SODIUM CHLORIDE 15 MILLILITER(S): 9 INJECTION INTRAMUSCULAR; INTRAVENOUS; SUBCUTANEOUS at 23:08

## 2018-12-15 RX ADMIN — PANTOPRAZOLE SODIUM 40 MILLIGRAM(S): 20 TABLET, DELAYED RELEASE ORAL at 21:57

## 2018-12-15 RX ADMIN — IMIPENEM AND CILASTATIN 100 MILLIGRAM(S): 250; 250 INJECTION, POWDER, FOR SOLUTION INTRAVENOUS at 21:57

## 2018-12-15 RX ADMIN — ALBUTEROL 2.5 MILLIGRAM(S): 90 AEROSOL, METERED ORAL at 22:55

## 2018-12-15 RX ADMIN — DORNASE ALFA 2.5 MILLIGRAM(S): 1 SOLUTION RESPIRATORY (INHALATION) at 07:51

## 2018-12-15 NOTE — CONSULT NOTE ADULT - SUBJECTIVE AND OBJECTIVE BOX
HPI: 43F with CF diagnosed at age 22 presenting for IV antibiotics in the setting of a 2 week CF exacerbation. About two weeks ago pt developed SOB. Had a productive cough but that was similar to her usual. Had cxr  that didn't show definitive pna. Saw Dr. Hollis and was started on colistin and meropenem via a PICC line as an outpt on 18. Pt states PICC started oozing yellow fluid and R arm developed a rash so on , old picc was removed. On , saw Dr. Hollis again and colistin was increased 150 mg IV q12h and meropenem changed to imipenem. Pt had new picc placed .    Has hx of resistant achromobacter tho some sputum cx show sensitivity to bactrim. Als o w hx of MSSA and ecoli in sputum cx.     Pt overall feels much better w very min sob. Cough is at baseline. Never had fever. Denies rhinorrhea, sore throat, cp,n/v, diarrhea dysuria. Recently in [place]. Has occasional R dull abd discomfort. Has been having some nausea she thinks may be associated w imipenem but no angioedema or sob or rash.      PAST MEDICAL & SURGICAL HISTORY:  Deviated septum  CF (Cystic Fibrosis)  sinus surgery-    (Normal Spontaneous Vaginal Delivery)      Allergies    clindamycin (Hives)  fluoroquinolone antibiotics (Unknown)  levofloxacin (Hives)  macrolide antibiotics (Other)  penicillin (Hives)  Zithromax Z-Ivan (Hives)    Intolerances        ANTIMICROBIALS:  colistimethate IVPB 150 two times a day  imipenem/cilastatin  IVPB    imipenem/cilastatin  IVPB 500 every 6 hours      OTHER MEDS:  ALBUTerol    0.083% 2.5 milliGRAM(s) Nebulizer every 8 hours  buDESOnide 160 MICROgram(s)/formoterol 4.5 MICROgram(s) Inhaler 2 Puff(s) Inhalation two times a day  cholecalciferol 3000 Unit(s) Oral daily  dornase diana Solution 2.5 milliGRAM(s) Inhalation two times a day  fluticasone propionate 50 MICROgram(s)/spray Nasal Spray 1 Spray(s) Both Nostrils two times a day  loratadine 10 milliGRAM(s) Oral daily  sodium chloride 3%  Inhalation 15 milliLiter(s) Inhalation two times a day      SOCIAL HISTORY:  Lives at home with . Has one daughter. Works as a . Denies smoking history. Social EtOH use. Denies recreational drug use.    FAMILY HISTORY:  Family history of diabetes mellitus (Father)  Family history of cystic fibrosis (Sibling)      ROS:  All other systems negative     Constitutional: no fever, no chills, no weight loss, no night sweats  Eye: no eye pain, no redness, no vision changes  ENT:  no sore throat, no rhinorrhea  Cardiovascular:  no chest pain, no palpitation  Respiratory:   SOB,  cough  GI:  no vomiting, no diarrhea  urinary: no dysuria  : no  discharge or bleeding  musculoskeletal:  no joint pain, no joint swelling  skin:  no rash  neurology:  no headache    Physical Exam:    General:    NAD, non toxic  Head: atraumatic, normocephalic  Eyes: normal sclera and conjunctiva  ENT:   no oropharyngeal lesions, no LAD, neck supple  Cardio:    regular S1,S2, no murmur  Respiratory:  rhonchi R base lung  abd: soft, BS +, not tender, no hepatosplenomegaly  Musculoskeletal : no joint swelling, no edema  Skin:   no rash  vascular: normal pulses  Neurologic:   no focal deficits  psych: normal affect      Drug Dosing Weight  Height (cm): 154.94 (14 Dec 2018 18:19)  Weight (kg): 75.5 (14 Dec 2018 18:19)  BMI (kg/m2): 31.4 (14 Dec 2018 18:19)  BSA (m2): 1.75 (14 Dec 2018 18:19)    Vital Signs Last 24 Hrs  T(F): 97.9 (12-15-18 @ 06:50), Max: 98.1 (18 @ 18:19)    Vital Signs Last 24 Hrs  HR: 72 (12-15-18 @ 07:35) (72 - 96)  BP: 109/66 (12-15-18 @ 06:50) (107/69 - 110/76)  RR: 18 (12-15-18 @ 06:50)  SpO2: 98% (12-15-18 @ 07:35) (98% - 99%)  Wt(kg): --                          11.7   6.83  )-----------( 336      ( 15 Dec 2018 07:15 )             37.3       12-15    137  |  102  |  10  ----------------------------<  90  4.2   |  23  |  0.46<L>    Ca    8.9      15 Dec 2018 07:15  Phos  2.9     12-15  Mg     2.0     12-15    TPro  7.5  /  Alb  4.1  /  TBili  0.6  /  DBili  x   /  AST  14  /  ALT  13  /  AlkPhos  61  12-15      Urinalysis Basic - ( 14 Dec 2018 22:52 )    Color: LIGHT YELLOW / Appearance: CLEAR / S.012 / pH: 6.5  Gluc: NEGATIVE / Ketone: NEGATIVE  / Bili: NEGATIVE / Urobili: NORMAL   Blood: NEGATIVE / Protein: NEGATIVE / Nitrite: NEGATIVE   Leuk Esterase: NEGATIVE / RBC: x / WBC x   Sq Epi: x / Non Sq Epi: x / Bacteria: x        MICROBIOLOGY:    SPUTUM  12-15-18 --  --  --    RADIOLOGY:    CXR no lung consolidation HPI: 43F with CF diagnosed at age 22 presenting for IV antibiotics in the setting of a 2 week CF exacerbation. About two weeks ago pt developed SOB. Had a productive cough but that was similar to her usual. Had cxr  that didn't show definitive pna. Saw Dr. Hollis and was started on colistin and meropenem via a PICC line as an outpt on 18. Pt states PICC started oozing yellow fluid and R arm developed a rash so on , old picc was removed. On , saw Dr. Hollis again and colistin was increased 150 mg IV q12h and meropenem changed to imipenem. Pt had new picc placed .    Has hx of pan- resistant achromobacter tho some sputum cx show sensitivity to bactrim. Also w hx of MSSA and ecoli in sputum cx.     Pt overall feels much better w very min sob. Cough is at baseline. Never had fever. Denies rhinorrhea, sore throat, cp,n/v, diarrhea dysuria. Recently in [place]. Has occasional R dull abd discomfort. Has been having some nausea she thinks may be associated w imipenem but no angioedema or sob or rash.      PAST MEDICAL & SURGICAL HISTORY:  Deviated septum  CF (Cystic Fibrosis)  sinus surgery-    (Normal Spontaneous Vaginal Delivery)      Allergies    clindamycin (Hives)  fluoroquinolone antibiotics (Unknown)  levofloxacin (Hives)  macrolide antibiotics (Other)  penicillin (Hives)  Zithromax Z-Ivan (Hives)    Intolerances        ANTIMICROBIALS:  colistimethate IVPB 150 two times a day  imipenem/cilastatin  IVPB    imipenem/cilastatin  IVPB 500 every 6 hours      OTHER MEDS:  ALBUTerol    0.083% 2.5 milliGRAM(s) Nebulizer every 8 hours  buDESOnide 160 MICROgram(s)/formoterol 4.5 MICROgram(s) Inhaler 2 Puff(s) Inhalation two times a day  cholecalciferol 3000 Unit(s) Oral daily  dornase diana Solution 2.5 milliGRAM(s) Inhalation two times a day  fluticasone propionate 50 MICROgram(s)/spray Nasal Spray 1 Spray(s) Both Nostrils two times a day  loratadine 10 milliGRAM(s) Oral daily  sodium chloride 3%  Inhalation 15 milliLiter(s) Inhalation two times a day      SOCIAL HISTORY:  Lives at home with . Has one daughter. Works as a . Denies smoking history. Social EtOH use. Denies recreational drug use.    FAMILY HISTORY:  Family history of diabetes mellitus (Father)  Family history of cystic fibrosis (Sibling)      ROS:  All other systems negative     Constitutional: no fever, no chills, no weight loss, no night sweats  Eye: no eye pain, no redness, no vision changes  ENT:  no sore throat, no rhinorrhea  Cardiovascular:  no chest pain, no palpitation  Respiratory:   SOB,  cough  GI:  no vomiting, no diarrhea  urinary: no dysuria  : no  discharge or bleeding  musculoskeletal:  no joint pain, no joint swelling  skin:  no rash  neurology:  no headache    Physical Exam:    General:    NAD, non toxic  Head: atraumatic, normocephalic  Eyes: normal sclera and conjunctiva  ENT:   no oropharyngeal lesions, no LAD, neck supple  Cardio:    regular S1,S2, no murmur  Respiratory:  rhonchi R base lung  abd: soft, BS +, not tender, no hepatosplenomegaly  Musculoskeletal : no joint swelling, no edema  Skin:   no rash  vascular: normal pulses  Neurologic:   no focal deficits  psych: normal affect      Drug Dosing Weight  Height (cm): 154.94 (14 Dec 2018 18:19)  Weight (kg): 75.5 (14 Dec 2018 18:19)  BMI (kg/m2): 31.4 (14 Dec 2018 18:19)  BSA (m2): 1.75 (14 Dec 2018 18:19)    Vital Signs Last 24 Hrs  T(F): 97.9 (12-15-18 @ 06:50), Max: 98.1 (18 @ 18:19)    Vital Signs Last 24 Hrs  HR: 72 (12-15-18 @ 07:35) (72 - 96)  BP: 109/66 (12-15-18 @ 06:50) (107/69 - 110/76)  RR: 18 (12-15-18 @ 06:50)  SpO2: 98% (12-15-18 @ 07:35) (98% - 99%)  Wt(kg): --                          11.7   6.83  )-----------( 336      ( 15 Dec 2018 07:15 )             37.3       12-15    137  |  102  |  10  ----------------------------<  90  4.2   |  23  |  0.46<L>    Ca    8.9      15 Dec 2018 07:15  Phos  2.9     12-15  Mg     2.0     12-15    TPro  7.5  /  Alb  4.1  /  TBili  0.6  /  DBili  x   /  AST  14  /  ALT  13  /  AlkPhos  61  12-15      Urinalysis Basic - ( 14 Dec 2018 22:52 )    Color: LIGHT YELLOW / Appearance: CLEAR / S.012 / pH: 6.5  Gluc: NEGATIVE / Ketone: NEGATIVE  / Bili: NEGATIVE / Urobili: NORMAL   Blood: NEGATIVE / Protein: NEGATIVE / Nitrite: NEGATIVE   Leuk Esterase: NEGATIVE / RBC: x / WBC x   Sq Epi: x / Non Sq Epi: x / Bacteria: x        MICROBIOLOGY:    SPUTUM  12-15-18 --  --  --    RADIOLOGY:    CXR no lung consolidation

## 2018-12-15 NOTE — CONSULT NOTE ADULT - ATTENDING COMMENTS
43F with CF on IV abx as outpatient being managed by Pulmonary with positive sputum cx, MDRO with h/o local PICC infection as out-patient   -clinically looks well  -no fever or leukocytosis  -on 2 weeks abx already  -not septic  -defer care per primary  -can prob stop IV abx soon given above  -duration of abx per Pulm  -PICC looks ok for now    Jesus Alberto Matias  Attending Physician   Division of Infectious Disease  Pager #[phone]  After 5pm/weekend or no response, call #[phone] 43F with CF exacerbation on IV abx as outpatient being managed by Pulmonary with positive sputum cx, MDRO in cultures, PICC line in place with h/o local PICC infection as out-patient   -clinically looks well  -no fever or leukocytosis  -on 2 weeks abx already  -not septic  -defer care per primary  -can prob stop IV abx soon given above  -duration of abx per Pulm  -PICC looks ok for now    Jesus Alberto Matias  Attending Physician   Division of Infectious Disease  Pager #[phone]  After 5pm/weekend or no response, call #[phone]

## 2018-12-15 NOTE — PHYSICAL THERAPY INITIAL EVALUATION ADULT - PERTINENT HX OF CURRENT PROBLEM, REHAB EVAL
43F with CF diagnosed at age 22 presenting for IV antibiotics in the setting of a 2 week CF exacerbation. During a trip to [place] 2 weeks ago the pt developed wheezing and CARDENAS associated with a mild cough productive of yellow sputum.

## 2018-12-15 NOTE — CONSULT NOTE ADULT - ASSESSMENT
note to follow 43F with CF diagnosed at age 22 presenting for IV antibiotics for CF exacerbation. Pt looks clinically well,.    CXR neg    - c/w imipenem and colistin  - follow sputum cx and bcx x2    d/w primary team

## 2018-12-15 NOTE — PROGRESS NOTE ADULT - PROBLEM SELECTOR PLAN 1
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 Past history of MSSA and actrobacter  (7/16/18) and E coli (1/5/17). Recently MAC neg on AFB (7/16/18). Has been on IV Colistin 100mg BID and Meropenem 500mg Q8H. No fevers but continues to have yellow sputum.  - As per outpatient pulmonologist will give IV colistin 150mg BID x 2 weeks and IV imipenem Q6H x 2 weeks  - Continue pulmozyme and 3% hypertonic saline BID   - Continue Advair  - ACT ([place] vest and Aerobika) TID  - Sputum culture (gram + cocci in clusters), awaiting speciation will speak with ID  - f/u blood cultures

## 2018-12-15 NOTE — PROVIDER CONTACT NOTE (OTHER) - ASSESSMENT
Patient refusing Symbicort, Claritin, and Flonase, patient says she takes Dymista, Allegra at home. Patient was told that those medications are alternatives to home meds. Patient refuses and does not want to take those medication.

## 2018-12-15 NOTE — PROGRESS NOTE ADULT - SUBJECTIVE AND OBJECTIVE BOX
Marioriontiti Sandoval, PGY1  Pager: [phone]  Spectra:    CHIEF COMPLAINT: Patient is a 43y old  Female who presents with a chief complaint of CF Exacerbation (14 Dec 2018 18:30)      INTERVAL HPI/OVERNIGHT EVENTS:    MEDICATIONS (STANDING):  ALBUTerol    0.083% 2.5 milliGRAM(s) Nebulizer every 8 hours  buDESOnide 160 MICROgram(s)/formoterol 4.5 MICROgram(s) Inhaler 2 Puff(s) Inhalation two times a day  cholecalciferol 3000 Unit(s) Oral daily  colistimethate IVPB 150 milliGRAM(s) IV Intermittent two times a day  dornase diana Solution 2.5 milliGRAM(s) Inhalation two times a day  fluticasone propionate 50 MICROgram(s)/spray Nasal Spray 1 Spray(s) Both Nostrils two times a day  imipenem/cilastatin  IVPB      imipenem/cilastatin  IVPB 500 milliGRAM(s) IV Intermittent every 6 hours  loratadine 10 milliGRAM(s) Oral daily  sodium chloride 3%  Inhalation 15 milliLiter(s) Inhalation two times a day    MEDICATIONS  (PRN):      REVIEW OF SYSTEMS:  CONSTITUTIONAL: No fever, weight loss, or fatigue  EYES: No eye pain, visual disturbances, or discharge  ENMT:  No difficulty hearing, tinnitus, vertigo; No sinus or throat pain  NECK: No pain or stiffness  RESPIRATORY: No cough, wheezing, chills or hemoptysis; No shortness of breath  CARDIOVASCULAR: No chest pain, palpitations, dizziness, or leg swelling  GASTROINTESTINAL: No abdominal or epigastric pain. No nausea, vomiting, or hematemesis; No diarrhea or constipation. No melena or hematochezia.  GENITOURINARY: No dysuria, frequency, hematuria, or incontinence  NEUROLOGICAL: No headaches, memory loss, loss of strength, numbness, or tremors  SKIN: No itching, burning, rashes, or lesions   MUSCULOSKELETAL: No joint pain or swelling; No muscle, back, or extremity pain    T(F): 97.7 (18 @ 22:15), Max: 98.1 (18 @ 18:19)  HR: 86 (18 @ 22:15) (86 - 96)  BP: 107/69 (18 @ 22:15) (107/69 - 110/76)  RR: 18 (18 @ 22:15) (18 - 18)  SpO2: 99% (18 @ 22:15) (98% - 99%)  Wt(kg): --  CAPILLARY BLOOD GLUCOSE        I&O's Summary      PHYSICAL EXAM:  GENERAL: NAD, well-groomed, well-developed  HEAD:  Atraumatic, Normocephalic  EYES: EOMI, PERRLA, conjunctiva and sclera clear  ENMT: No tonsillar erythema, exudates, or enlargement; Moist mucous membranes  NECK: Supple, No JVD, Normal thyroid  NERVOUS SYSTEM:  Alert & Oriented X3, Good concentration; Motor Strength 5/5 B/L upper and lower extremities  CHEST/LUNG: Clear to percussion bilaterally; No rales, rhonchi, wheezing, or rubs  HEART: Regular rate and rhythm; No murmurs, rubs, or gallops  ABDOMEN: Soft, Nontender, Nondistended; Bowel sounds present  EXTREMITIES:  2+ Peripheral Pulses, No clubbing, cyanosis, or edema  LYMPH: No lymphadenopathy noted  SKIN: No rashes or lesions    LABS:                        12.1   9.29  )-----------( 337      ( 14 Dec 2018 19:40 )             38.3     -    137  |  101  |  13  ----------------------------<  84  3.8   |  23  |  0.61    Ca    9.2      14 Dec 2018 19:40    TPro  7.8  /  Alb  4.2  /  TBili  0.4  /  DBili  x   /  AST  17  /  ALT  13  /  AlkPhos  60        Urinalysis Basic - ( 14 Dec 2018 22:52 )    Color: LIGHT YELLOW / Appearance: CLEAR / S.012 / pH: 6.5  Gluc: NEGATIVE / Ketone: NEGATIVE  / Bili: NEGATIVE / Urobili: NORMAL   Blood: NEGATIVE / Protein: NEGATIVE / Nitrite: NEGATIVE   Leuk Esterase: NEGATIVE / RBC: x / WBC x   Sq Epi: x / Non Sq Epi: x / Bacteria: x          RADIOLOGY & ADDITIONAL TESTS: Marioriontiti Sandoval, PGY1  Pager: [phone]  Spectra:    CHIEF COMPLAINT: Patient is a 43y old  Female who presents with a chief complaint of CF Exacerbation (14 Dec 2018 18:30)      INTERVAL HPI/OVERNIGHT EVENTS:  No acute events overnight. Had some nausea with antibiotics but no rash or vomiting. No fevers/chills, chest pain, shortness of breath, abdominal pain, diarrhea or constipation.     MEDICATIONS (STANDING):  ALBUTerol    0.083% 2.5 milliGRAM(s) Nebulizer every 8 hours  buDESOnide 160 MICROgram(s)/formoterol 4.5 MICROgram(s) Inhaler 2 Puff(s) Inhalation two times a day  cholecalciferol 3000 Unit(s) Oral daily  colistimethate IVPB 150 milliGRAM(s) IV Intermittent two times a day  dornase diana Solution 2.5 milliGRAM(s) Inhalation two times a day  fluticasone propionate 50 MICROgram(s)/spray Nasal Spray 1 Spray(s) Both Nostrils two times a day  imipenem/cilastatin  IVPB      imipenem/cilastatin  IVPB 500 milliGRAM(s) IV Intermittent every 6 hours  loratadine 10 milliGRAM(s) Oral daily  sodium chloride 3%  Inhalation 15 milliLiter(s) Inhalation two times a day    MEDICATIONS  (PRN):    T(F): 97.7 (18 @ 22:15), Max: 98.1 (18 @ 18:19)  HR: 86 (18 @ 22:15) (86 - 96)  BP: 107/69 (18 @ 22:15) (107/69 - 110/76)  RR: 18 (18 @ 22:15) (18 - 18)  SpO2: 99% (18 @ 22:15) (98% - 99%)  Wt(kg): --  CAPILLARY BLOOD GLUCOSE      I&O's Summary      PHYSICAL EXAM:  GENERAL: NAD, well-groomed, well-developed  HEAD:  Atraumatic, Normocephalic  EYES: EOMI, PERRLA, conjunctiva and sclera clear  ENMT: No tonsillar erythema, exudates, or enlargement; Moist mucous membranes  NECK: Supple, No JVD, Normal thyroid  NERVOUS SYSTEM:  Alert & Oriented X3, Good concentration; Motor Strength 5/5 B/L upper and lower extremities  CHEST/LUNG: Clear to percussion bilaterally; No rales, rhonchi, wheezing, or rubs  HEART: Regular rate and rhythm; No murmurs, rubs, or gallops  ABDOMEN: Soft, Nontender, Nondistended; Bowel sounds present  EXTREMITIES:  2+ Peripheral Pulses, No clubbing, cyanosis, or edema. PICC upper right arm.   LYMPH: No lymphadenopathy noted  SKIN: No rashes or lesions    LABS:                        12.1   9.29  )-----------( 337      ( 14 Dec 2018 19:40 )             38.3     -    137  |  101  |  13  ----------------------------<  84  3.8   |  23  |  0.61    Ca    9.2      14 Dec 2018 19:40    TPro  7.8  /  Alb  4.2  /  TBili  0.4  /  DBili  x   /  AST  17  /  ALT  13  /  AlkPhos  60  -      Urinalysis Basic - ( 14 Dec 2018 22:52 )    Color: LIGHT YELLOW / Appearance: CLEAR / S.012 / pH: 6.5  Gluc: NEGATIVE / Ketone: NEGATIVE  / Bili: NEGATIVE / Urobili: NORMAL   Blood: NEGATIVE / Protein: NEGATIVE / Nitrite: NEGATIVE   Leuk Esterase: NEGATIVE / RBC: x / WBC x   Sq Epi: x / Non Sq Epi: x / Bacteria: x          RADIOLOGY & ADDITIONAL TESTS:

## 2018-12-16 PROCEDURE — 99233 SBSQ HOSP IP/OBS HIGH 50: CPT | Mod: GC

## 2018-12-16 RX ORDER — DIPHENHYDRAMINE HCL 50 MG
25 CAPSULE ORAL ONCE
Qty: 0 | Refills: 0 | Status: COMPLETED | OUTPATIENT
Start: 2018-12-16 | End: 2018-12-16

## 2018-12-16 RX ORDER — CHLORHEXIDINE GLUCONATE 213 G/1000ML
1 SOLUTION TOPICAL ONCE
Qty: 0 | Refills: 0 | Status: DISCONTINUED | OUTPATIENT
Start: 2018-12-16 | End: 2018-12-16

## 2018-12-16 RX ORDER — CHLORHEXIDINE GLUCONATE 213 G/1000ML
1 SOLUTION TOPICAL
Qty: 0 | Refills: 0 | Status: DISCONTINUED | OUTPATIENT
Start: 2018-12-16 | End: 2018-12-16

## 2018-12-16 RX ADMIN — IMIPENEM AND CILASTATIN 100 MILLIGRAM(S): 250; 250 INJECTION, POWDER, FOR SOLUTION INTRAVENOUS at 20:12

## 2018-12-16 RX ADMIN — COLISTIMETHATE SODIUM 100 MILLIGRAM(S): 150 INJECTION INTRAMUSCULAR; INTRAVENOUS at 21:18

## 2018-12-16 RX ADMIN — Medication 25 MILLIGRAM(S): at 09:18

## 2018-12-16 RX ADMIN — SODIUM CHLORIDE 4 MILLILITER(S): 9 INJECTION INTRAMUSCULAR; INTRAVENOUS; SUBCUTANEOUS at 08:20

## 2018-12-16 RX ADMIN — Medication 3000 UNIT(S): at 11:05

## 2018-12-16 RX ADMIN — PANTOPRAZOLE SODIUM 40 MILLIGRAM(S): 20 TABLET, DELAYED RELEASE ORAL at 07:48

## 2018-12-16 RX ADMIN — IMIPENEM AND CILASTATIN 100 MILLIGRAM(S): 250; 250 INJECTION, POWDER, FOR SOLUTION INTRAVENOUS at 15:08

## 2018-12-16 RX ADMIN — IMIPENEM AND CILASTATIN 100 MILLIGRAM(S): 250; 250 INJECTION, POWDER, FOR SOLUTION INTRAVENOUS at 03:07

## 2018-12-16 RX ADMIN — DORNASE ALFA 2.5 MILLIGRAM(S): 1 SOLUTION RESPIRATORY (INHALATION) at 21:38

## 2018-12-16 RX ADMIN — SODIUM CHLORIDE 4 MILLILITER(S): 9 INJECTION INTRAMUSCULAR; INTRAVENOUS; SUBCUTANEOUS at 21:33

## 2018-12-16 RX ADMIN — COLISTIMETHATE SODIUM 100 MILLIGRAM(S): 150 INJECTION INTRAMUSCULAR; INTRAVENOUS at 00:03

## 2018-12-16 RX ADMIN — COLISTIMETHATE SODIUM 100 MILLIGRAM(S): 150 INJECTION INTRAMUSCULAR; INTRAVENOUS at 10:30

## 2018-12-16 RX ADMIN — IMIPENEM AND CILASTATIN 100 MILLIGRAM(S): 250; 250 INJECTION, POWDER, FOR SOLUTION INTRAVENOUS at 09:17

## 2018-12-16 RX ADMIN — ALBUTEROL 2.5 MILLIGRAM(S): 90 AEROSOL, METERED ORAL at 15:14

## 2018-12-16 RX ADMIN — ALBUTEROL 2.5 MILLIGRAM(S): 90 AEROSOL, METERED ORAL at 21:25

## 2018-12-16 RX ADMIN — ALBUTEROL 2.5 MILLIGRAM(S): 90 AEROSOL, METERED ORAL at 08:10

## 2018-12-16 RX ADMIN — Medication 25 MILLIGRAM(S): at 00:56

## 2018-12-16 RX ADMIN — DORNASE ALFA 2.5 MILLIGRAM(S): 1 SOLUTION RESPIRATORY (INHALATION) at 08:38

## 2018-12-16 NOTE — PROGRESS NOTE ADULT - SUBJECTIVE AND OBJECTIVE BOX
CHIEF COMPLAINT: Patient is a 43y old  Female who presents with a chief complaint of CF Exacerbation (14 Dec 2018 18:30)      INTERVAL HPI/OVERNIGHT EVENTS:  No acute events overnight. Had some nausea with antibiotics but no rash or vomiting. No fevers/chills, chest pain, shortness of breath, abdominal pain, diarrhea or constipation.     MEDICATIONS (STANDING):  ALBUTerol    0.083% 2.5 milliGRAM(s) Nebulizer every 8 hours  buDESOnide 160 MICROgram(s)/formoterol 4.5 MICROgram(s) Inhaler 2 Puff(s) Inhalation two times a day  cholecalciferol 3000 Unit(s) Oral daily  colistimethate IVPB 150 milliGRAM(s) IV Intermittent two times a day  dornase diana Solution 2.5 milliGRAM(s) Inhalation two times a day  fluticasone propionate 50 MICROgram(s)/spray Nasal Spray 1 Spray(s) Both Nostrils two times a day  imipenem/cilastatin  IVPB      imipenem/cilastatin  IVPB 500 milliGRAM(s) IV Intermittent every 6 hours  loratadine 10 milliGRAM(s) Oral daily  sodium chloride 3%  Inhalation 15 milliLiter(s) Inhalation two times a day    MEDICATIONS  (PRN):    T(F): 97.7 (18 @ 22:15), Max: 98.1 (18 @ 18:19)  HR: 86 (18 @ 22:15) (86 - 96)  BP: 107/69 (18 @ 22:15) (107/69 - 110/76)  RR: 18 (18 @ 22:15) (18 - 18)  SpO2: 99% (18 @ 22:15) (98% - 99%)  Wt(kg): --  CAPILLARY BLOOD GLUCOSE      I&O's Summary      PHYSICAL EXAM:  GENERAL: NAD, well-groomed, well-developed  HEAD:  Atraumatic, Normocephalic  EYES: EOMI, PERRLA, conjunctiva and sclera clear  ENMT: No tonsillar erythema, exudates, or enlargement; Moist mucous membranes  NECK: Supple, No JVD, Normal thyroid  NERVOUS SYSTEM:  Alert & Oriented X3, Good concentration; Motor Strength 5/5 B/L upper and lower extremities  CHEST/LUNG: Clear to percussion bilaterally; No rales, rhonchi, wheezing, or rubs  HEART: Regular rate and rhythm; No murmurs, rubs, or gallops  ABDOMEN: Soft, Nontender, Nondistended; Bowel sounds present  EXTREMITIES:  2+ Peripheral Pulses, No clubbing, cyanosis, or edema. PICC upper right arm.   LYMPH: No lymphadenopathy noted  SKIN: No rashes or lesions    LABS:                        12.1   9.29  )-----------( 337      ( 14 Dec 2018 19:40 )             38.3     12-14    137  |  101  |  13  ----------------------------<  84  3.8   |  23  |  0.61    Ca    9.2      14 Dec 2018 19:40    TPro  7.8  /  Alb  4.2  /  TBili  0.4  /  DBili  x   /  AST  17  /  ALT  13  /  AlkPhos  60  12-14      Urinalysis Basic - ( 14 Dec 2018 22:52 )    Color: LIGHT YELLOW / Appearance: CLEAR / S.012 / pH: 6.5  Gluc: NEGATIVE / Ketone: NEGATIVE  / Bili: NEGATIVE / Urobili: NORMAL   Blood: NEGATIVE / Protein: NEGATIVE / Nitrite: NEGATIVE   Leuk Esterase: NEGATIVE / RBC: x / WBC x   Sq Epi: x / Non Sq Epi: x / Bacteria: x          RADIOLOGY & ADDITIONAL TESTS: CHIEF COMPLAINT: Patient is a 43y old  Female who presents with a chief complaint of CF Exacerbation (14 Dec 2018 18:30)      INTERVAL HPI/OVERNIGHT EVENTS:  No acute events overnight. Breathing well with no sig SOB or cough. Concerned about developing a delayed allergic reaction around PICC site     MEDICATIONS  (STANDING):  ALBUTerol    0.083% 2.5 milliGRAM(s) Nebulizer every 8 hours  buDESOnide 160 MICROgram(s)/formoterol 4.5 MICROgram(s) Inhaler 2 Puff(s) Inhalation two times a day  chlorhexidine 4% Liquid 1 Application(s) Topical <User Schedule>  cholecalciferol 3000 Unit(s) Oral daily  colistimethate IVPB 150 milliGRAM(s) IV Intermittent two times a day  dornase diana Solution 2.5 milliGRAM(s) Inhalation two times a day  fluticasone propionate 50 MICROgram(s)/spray Nasal Spray 1 Spray(s) Both Nostrils two times a day  imipenem/cilastatin  IVPB      imipenem/cilastatin  IVPB 500 milliGRAM(s) IV Intermittent every 6 hours  loratadine 10 milliGRAM(s) Oral daily  pantoprazole    Tablet 40 milliGRAM(s) Oral before breakfast  sodium chloride 3%  Inhalation 15 milliLiter(s) Inhalation two times a day    MEDICATIONS  (PRN):    Vital Signs Last 24 Hrs  T(C): 36.6 (16 Dec 2018 06:07), Max: 36.9 (15 Dec 2018 12:53)  T(F): 97.8 (16 Dec 2018 06:07), Max: 98.5 (15 Dec 2018 12:53)  HR: 81 (16 Dec 2018 08:10) (74 - 97)  BP: 110/65 (16 Dec 2018 06:07) (110/65 - 116/68)  BP(mean): --  RR: 18 (16 Dec 2018 06:07) (16 - 18)  SpO2: 100% (16 Dec 2018 08:10) (97% - 100%)    I&O's Summary      PHYSICAL EXAM:  GENERAL: NAD, well-groomed, well-developed  HEAD:  Atraumatic, Normocephalic  EYES: EOMI, PERRLA, conjunctiva and sclera clear  ENMT: No tonsillar erythema, exudates, or enlargement; Moist mucous membranes  NECK: Supple, No JVD, Normal thyroid  NERVOUS SYSTEM:  Alert & Oriented X3, Good concentration; Motor Strength 5/5 B/L upper and lower extremities  CHEST/LUNG: Clear to percussion bilaterally; No rales, rhonchi, wheezing, or rubs  HEART: Regular rate and rhythm; No murmurs, rubs, or gallops  ABDOMEN: Soft, Nontender, Nondistended; Bowel sounds present  EXTREMITIES:  2+ Peripheral Pulses, No clubbing, cyanosis, or edema. PICC upper right arm.   LYMPH: No lymphadenopathy noted  SKIN: No rashes or lesions    LABS:                                   11.7   6.83  )-----------( 336      ( 15 Dec 2018 07:15 )             37.3     12-15    137  |  102  |  10  ----------------------------<  90  4.2   |  23  |  0.46<L>    Ca    8.9      15 Dec 2018 07:15  Phos  2.9     12-15  Mg     2.0     12-15    TPro  7.5  /  Alb  4.1  /  TBili  0.6  /  DBili  x   /  AST  14  /  ALT  13  /  AlkPhos  61  12-15        Urinalysis Basic - ( 14 Dec 2018 22:52 )    Color: LIGHT YELLOW / Appearance: CLEAR / S.012 / pH: 6.5  Gluc: NEGATIVE / Ketone: NEGATIVE  / Bili: NEGATIVE / Urobili: NORMAL   Blood: NEGATIVE / Protein: NEGATIVE / Nitrite: NEGATIVE   Leuk Esterase: NEGATIVE / RBC: x / WBC x   Sq Epi: x / Non Sq Epi: x / Bacteria: x          RADIOLOGY & ADDITIONAL TESTS: CHIEF COMPLAINT: Patient is a 43y old  Female who presents with a chief complaint of CF Exacerbation (14 Dec 2018 18:30)      INTERVAL HPI/OVERNIGHT EVENTS:  No acute events overnight. Breathing well with no sig SOB or cough. Concerned about developing a delayed allergic reaction around PICC site     MEDICATIONS  (STANDING):  ALBUTerol    0.083% 2.5 milliGRAM(s) Nebulizer every 8 hours  buDESOnide 160 MICROgram(s)/formoterol 4.5 MICROgram(s) Inhaler 2 Puff(s) Inhalation two times a day  chlorhexidine 4% Liquid 1 Application(s) Topical <User Schedule>  cholecalciferol 3000 Unit(s) Oral daily  colistimethate IVPB 150 milliGRAM(s) IV Intermittent two times a day  dornase diana Solution 2.5 milliGRAM(s) Inhalation two times a day  fluticasone propionate 50 MICROgram(s)/spray Nasal Spray 1 Spray(s) Both Nostrils two times a day  imipenem/cilastatin  IVPB      imipenem/cilastatin  IVPB 500 milliGRAM(s) IV Intermittent every 6 hours  loratadine 10 milliGRAM(s) Oral daily  pantoprazole    Tablet 40 milliGRAM(s) Oral before breakfast  sodium chloride 3%  Inhalation 15 milliLiter(s) Inhalation two times a day    MEDICATIONS  (PRN):    Vital Signs Last 24 Hrs  T(C): 36.6 (16 Dec 2018 06:07), Max: 36.9 (15 Dec 2018 12:53)  T(F): 97.8 (16 Dec 2018 06:07), Max: 98.5 (15 Dec 2018 12:53)  HR: 81 (16 Dec 2018 08:10) (74 - 97)  BP: 110/65 (16 Dec 2018 06:07) (110/65 - 116/68)  BP(mean): --  RR: 18 (16 Dec 2018 06:07) (16 - 18)  SpO2: 100% (16 Dec 2018 08:10) (97% - 100%)    I&O's Summary      PHYSICAL EXAM:  GENERAL: NAD, well-groomed, well-developed  HEAD:  Atraumatic, Normocephalic  EYES: EOMI, PERRLA, conjunctiva and sclera clear  ENMT: No tonsillar erythema, exudates, or enlargement; Moist mucous membranes  NECK: Supple, No JVD, Normal thyroid  NERVOUS SYSTEM:  Alert & Oriented X3, Good concentration; Motor Strength 5/5 B/L upper and lower extremities  CHEST/LUNG: Clear to percussion bilaterally; No rales, rhonchi, wheezing, or rubs  HEART: Regular rate and rhythm; No murmurs, rubs, or gallops  ABDOMEN: Soft, Nontender, Nondistended; Bowel sounds present  EXTREMITIES:  2+ Peripheral Pulses, No clubbing, cyanosis, or edema. PICC upper right arm.   LYMPH: No lymphadenopathy noted  SKIN: No rashes or lesions    LABS:                                   11.7   6.83  )-----------( 336      ( 15 Dec 2018 07:15 )             37.3       12-15    137  |  102  |  10  ----------------------------<  90  4.2   |  23  |  0.46<L>    Ca    8.9      15 Dec 2018 07:15  Phos  2.9     12-15  Mg     2.0     12-15    TPro  7.5  /  Alb  4.1  /  TBili  0.6  /  DBili  x   /  AST  14  /  ALT  13  /  AlkPhos  61  12-15          RADIOLOGY & ADDITIONAL TESTS:

## 2018-12-16 NOTE — PROGRESS NOTE ADULT - PROBLEM SELECTOR PLAN 1
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 Past history of MSSA and actrobacter  (7/16/18) and E coli (1/5/17). Recently MAC neg on AFB (7/16/18). Has been on IV Colistin 100mg BID and Meropenem 500mg Q8H. No fevers but continues to have yellow sputum.  - As per outpatient pulmonologist will give IV colistin 150mg BID x 2 weeks and IV imipenem Q6H x 2 weeks  - Continue pulmozyme and 3% hypertonic saline BID   - Continue Advair  - ACT (Loami vest and Aerobika) TID  - Sputum culture (gram + cocci in clusters), awaiting speciation will speak with ID  - f/u blood cultures

## 2018-12-17 ENCOUNTER — TRANSCRIPTION ENCOUNTER (OUTPATIENT)
Age: 43
End: 2018-12-17

## 2018-12-17 LAB
BACTERIA SPT CF RESP CULT: ABNORMAL
BACTERIA SPT RESP CULT: SIGNIFICANT CHANGE UP

## 2018-12-17 PROCEDURE — 99232 SBSQ HOSP IP/OBS MODERATE 35: CPT

## 2018-12-17 PROCEDURE — 99233 SBSQ HOSP IP/OBS HIGH 50: CPT | Mod: GC

## 2018-12-17 RX ORDER — DIPHENHYDRAMINE HCL 50 MG
25 CAPSULE ORAL EVERY 4 HOURS
Qty: 0 | Refills: 0 | Status: DISCONTINUED | OUTPATIENT
Start: 2018-12-17 | End: 2018-12-17

## 2018-12-17 RX ORDER — FLUCONAZOLE 150 MG/1
200 TABLET ORAL ONCE
Qty: 0 | Refills: 0 | Status: COMPLETED | OUTPATIENT
Start: 2018-12-17 | End: 2018-12-17

## 2018-12-17 RX ORDER — DIPHENHYDRAMINE HCL 50 MG
25 CAPSULE ORAL EVERY 6 HOURS
Qty: 0 | Refills: 0 | Status: DISCONTINUED | OUTPATIENT
Start: 2018-12-17 | End: 2018-12-18

## 2018-12-17 RX ADMIN — SODIUM CHLORIDE 4 MILLILITER(S): 9 INJECTION INTRAMUSCULAR; INTRAVENOUS; SUBCUTANEOUS at 22:19

## 2018-12-17 RX ADMIN — Medication 25 MILLIGRAM(S): at 22:05

## 2018-12-17 RX ADMIN — ALBUTEROL 2.5 MILLIGRAM(S): 90 AEROSOL, METERED ORAL at 15:40

## 2018-12-17 RX ADMIN — Medication 25 MILLIGRAM(S): at 15:15

## 2018-12-17 RX ADMIN — COLISTIMETHATE SODIUM 100 MILLIGRAM(S): 150 INJECTION INTRAMUSCULAR; INTRAVENOUS at 10:38

## 2018-12-17 RX ADMIN — PANTOPRAZOLE SODIUM 40 MILLIGRAM(S): 20 TABLET, DELAYED RELEASE ORAL at 05:09

## 2018-12-17 RX ADMIN — IMIPENEM AND CILASTATIN 100 MILLIGRAM(S): 250; 250 INJECTION, POWDER, FOR SOLUTION INTRAVENOUS at 02:02

## 2018-12-17 RX ADMIN — FLUCONAZOLE 200 MILLIGRAM(S): 150 TABLET ORAL at 15:15

## 2018-12-17 RX ADMIN — DORNASE ALFA 2.5 MILLIGRAM(S): 1 SOLUTION RESPIRATORY (INHALATION) at 07:41

## 2018-12-17 RX ADMIN — SODIUM CHLORIDE 15 MILLILITER(S): 9 INJECTION INTRAMUSCULAR; INTRAVENOUS; SUBCUTANEOUS at 07:41

## 2018-12-17 RX ADMIN — ALBUTEROL 2.5 MILLIGRAM(S): 90 AEROSOL, METERED ORAL at 22:00

## 2018-12-17 RX ADMIN — DORNASE ALFA 2.5 MILLIGRAM(S): 1 SOLUTION RESPIRATORY (INHALATION) at 22:10

## 2018-12-17 RX ADMIN — ALBUTEROL 2.5 MILLIGRAM(S): 90 AEROSOL, METERED ORAL at 07:37

## 2018-12-17 RX ADMIN — Medication 3000 UNIT(S): at 11:22

## 2018-12-17 RX ADMIN — COLISTIMETHATE SODIUM 100 MILLIGRAM(S): 150 INJECTION INTRAMUSCULAR; INTRAVENOUS at 21:50

## 2018-12-17 RX ADMIN — IMIPENEM AND CILASTATIN 100 MILLIGRAM(S): 250; 250 INJECTION, POWDER, FOR SOLUTION INTRAVENOUS at 08:40

## 2018-12-17 RX ADMIN — IMIPENEM AND CILASTATIN 100 MILLIGRAM(S): 250; 250 INJECTION, POWDER, FOR SOLUTION INTRAVENOUS at 15:15

## 2018-12-17 RX ADMIN — IMIPENEM AND CILASTATIN 100 MILLIGRAM(S): 250; 250 INJECTION, POWDER, FOR SOLUTION INTRAVENOUS at 20:10

## 2018-12-17 NOTE — DISCHARGE NOTE ADULT - MEDICATION SUMMARY - MEDICATIONS TO TAKE
I will START or STAY ON the medications listed below when I get home from the hospital:    Imipenem/Cilastatin 500mg Every 6 hours in 0.9% Sodium Chloride 100 ml IV intermittent infused over 60 minutes through PICC line  -- Indication: Cystic Fibrosis Exacerbation (ICD 10 E84)   End Date: 12/28/18  -- Indication: For Cystic fibrosis exacerbation    [id number] Twice a day in Dextrose 5% 50 ml IV intermittent infuse over 30 minutes via PICC line   -- Indication: Cystic Fibrosis Exacerbation (ICD 10: E84)  End Date: 12/28/18  -- Indication: For Cystic fibrosis exacerbation    Patient will require CBC and CMP once a week (Monday) sent to Dr. Madisyn Hollis   -- Receiving IV antibiotics via a PICC  Indication: CF Exacerbation (ICD10: E84)  End Date: 12/28/18  -- Indication: For Cystic fibrosis exacerbation    Kitabis Ivan 60 mg/mL inhalation solution  -- 5 milliliter(s) inhaled 2 times a day for 28 days on and 28 days off  -- Indication: For Cystic fibrosis    fexofenadine 180 mg oral tablet  -- 1 tab(s) by mouth once a day, As Needed  -- Indication: For Allergy    Advair Diskus 500 mcg-50 mcg inhalation powder  -- 1 puff(s) inhaled 2 times a day  -- Indication: For Cystic fibrosis    albuterol 2.5 mg/3 mL (0.083%) inhalation solution  -- 3 milliliter(s) inhaled every 4 -  6 hours for wheezing  -- Indication: For Cystic fibrosis    ProAir HFA 90 mcg/inh inhalation aerosol  -- 2 puff(s) inhaled 4 times a day, As Needed  -- Indication: For Cystic fibrosis    Niferex 50 mg oral tablet  -- 50 milligram(s) by mouth once a day  -- Indication: For Cystic fibrosis    Pulmozyme 2.5 mg/2.5 mL inhalation solution  -- 2.5 milliliter(s) inhaled once a day  -- Indication: For Cystic fibrosis    sodium chloride 3% nasal solution  -- 1 application into nose 2 times a day  -- Indication: For Cystic fibrosis    Dymista 137 mcg-50 mcg/inh nasal spray  -- 1 spray(s) into nose 2 times a day  -- Indication: For Cystic fibrosis    Vitamin D3  -- 3000 unit(s) by mouth once a day  -- Indication: For Supplementation

## 2018-12-17 NOTE — DISCHARGE NOTE ADULT - CARE PROVIDER_API CALL
Madisyn Hollis), Critical Care Medicine; Internal Medicine; Pulmonary Disease; Sleep Medicine  [address] [place] [address]  Phone: [phone]  Fax: [phone]

## 2018-12-17 NOTE — PROGRESS NOTE ADULT - SUBJECTIVE AND OBJECTIVE BOX
Marioriontiti Sandoval, PGY1  Pager: [phone]  Spectra: [phone]    CHIEF COMPLAINT: Patient is a 43y old  Female who presents with a chief complaint of CF Exacerbation (16 Dec 2018 07:32)      INTERVAL HPI/OVERNIGHT EVENTS:  No acute events overnight    MEDICATIONS (STANDING):  ALBUTerol    0.083% 2.5 milliGRAM(s) Nebulizer every 8 hours  buDESOnide 160 MICROgram(s)/formoterol 4.5 MICROgram(s) Inhaler 2 Puff(s) Inhalation two times a day  cholecalciferol 3000 Unit(s) Oral daily  colistimethate IVPB 150 milliGRAM(s) IV Intermittent two times a day  dornase diana Solution 2.5 milliGRAM(s) Inhalation two times a day  fluticasone propionate 50 MICROgram(s)/spray Nasal Spray 1 Spray(s) Both Nostrils two times a day  imipenem/cilastatin  IVPB      imipenem/cilastatin  IVPB 500 milliGRAM(s) IV Intermittent every 6 hours  loratadine 10 milliGRAM(s) Oral daily  pantoprazole    Tablet 40 milliGRAM(s) Oral before breakfast  sodium chloride 3%  Inhalation 15 milliLiter(s) Inhalation two times a day    MEDICATIONS  (PRN):      REVIEW OF SYSTEMS:  CONSTITUTIONAL: No fever, weight loss, or fatigue  EYES: No eye pain, visual disturbances, or discharge  ENMT:  No difficulty hearing, tinnitus, vertigo; No sinus or throat pain  NECK: No pain or stiffness  RESPIRATORY: No cough, wheezing, chills or hemoptysis; No shortness of breath  CARDIOVASCULAR: No chest pain, palpitations, dizziness, or leg swelling  GASTROINTESTINAL: No abdominal or epigastric pain. No nausea, vomiting, or hematemesis; No diarrhea or constipation. No melena or hematochezia.  GENITOURINARY: No dysuria, frequency, hematuria, or incontinence  NEUROLOGICAL: No headaches, memory loss, loss of strength, numbness, or tremors  SKIN: No itching, burning, rashes, or lesions   MUSCULOSKELETAL: No joint pain or swelling; No muscle, back, or extremity pain    T(F): 97.9 (12-17-18 @ 05:00), Max: 97.9 (12-16-18 @ 21:22)  HR: 73 (12-17-18 @ 07:37) (73 - 87)  BP: 104/57 (12-17-18 @ 05:00) (100/67 - 120/61)  RR: 18 (12-17-18 @ 05:00) (17 - 18)  SpO2: 100% (12-17-18 @ 07:37) (97% - 100%)  Wt(kg): --  CAPILLARY BLOOD GLUCOSE        I&O's Summary      PHYSICAL EXAM:  GENERAL: NAD, well-groomed, well-developed  HEAD:  Atraumatic, Normocephalic  EYES: EOMI, PERRLA, conjunctiva and sclera clear  ENMT: No tonsillar erythema, exudates, or enlargement; Moist mucous membranes  NECK: Supple, No JVD, Normal thyroid  NERVOUS SYSTEM:  Alert & Oriented X3, Good concentration; Motor Strength 5/5 B/L upper and lower extremities  CHEST/LUNG: Clear to percussion bilaterally; No rales, rhonchi, wheezing, or rubs  HEART: Regular rate and rhythm; No murmurs, rubs, or gallops  ABDOMEN: Soft, Nontender, Nondistended; Bowel sounds present  EXTREMITIES:  2+ Peripheral Pulses, No clubbing, cyanosis, or edema. PICC upper right arm.   LYMPH: No lymphadenopathy noted  SKIN: No rashes or lesions    LABS:                  RADIOLOGY & ADDITIONAL TESTS: Marinorma Vuongscottie, PGY1  Pager: [phone]  Spectra: [phone]    CHIEF COMPLAINT: Patient is a 43y old  Female who presents with a chief complaint of CF Exacerbation (16 Dec 2018 07:32)      INTERVAL HPI/OVERNIGHT EVENTS:  No acute events overnight. Reports yesterday had some burning sensation near the PICC site since patient reportedly has an allergy to chlorprep. Dressing was changed. Reports feeling well today. Minimal nausea with the antibiotics, no numbness and tingling.     MEDICATIONS (STANDING):  ALBUTerol    0.083% 2.5 milliGRAM(s) Nebulizer every 8 hours  buDESOnide 160 MICROgram(s)/formoterol 4.5 MICROgram(s) Inhaler 2 Puff(s) Inhalation two times a day  cholecalciferol 3000 Unit(s) Oral daily  colistimethate IVPB 150 milliGRAM(s) IV Intermittent two times a day  dornase diana Solution 2.5 milliGRAM(s) Inhalation two times a day  fluticasone propionate 50 MICROgram(s)/spray Nasal Spray 1 Spray(s) Both Nostrils two times a day  imipenem/cilastatin  IVPB      imipenem/cilastatin  IVPB 500 milliGRAM(s) IV Intermittent every 6 hours  loratadine 10 milliGRAM(s) Oral daily  pantoprazole    Tablet 40 milliGRAM(s) Oral before breakfast  sodium chloride 3%  Inhalation 15 milliLiter(s) Inhalation two times a day    MEDICATIONS  (PRN):      REVIEW OF SYSTEMS:  CONSTITUTIONAL: No fever, weight loss, or fatigue  EYES: No eye pain, visual disturbances, or discharge  ENMT:  No difficulty hearing, tinnitus, vertigo; No sinus or throat pain  NECK: No pain or stiffness  RESPIRATORY: No cough, wheezing, chills or hemoptysis; No shortness of breath  CARDIOVASCULAR: No chest pain, palpitations, dizziness, or leg swelling  GASTROINTESTINAL: No abdominal or epigastric pain. Some nausea, no vomiting, or hematemesis; No diarrhea or constipation. No melena or hematochezia.  GENITOURINARY: No dysuria, frequency, hematuria, or incontinence  NEUROLOGICAL: No headaches, memory loss, loss of strength, numbness, or tremors  SKIN: No itching, burning, rashes, or lesions   MUSCULOSKELETAL: No joint pain or swelling; No muscle, back, or extremity pain    T(F): 97.9 (12-17-18 @ 05:00), Max: 97.9 (12-16-18 @ 21:22)  HR: 73 (12-17-18 @ 07:37) (73 - 87)  BP: 104/57 (12-17-18 @ 05:00) (100/67 - 120/61)  RR: 18 (12-17-18 @ 05:00) (17 - 18)  SpO2: 100% (12-17-18 @ 07:37) (97% - 100%)  Wt(kg): --  CAPILLARY BLOOD GLUCOSE        I&O's Summary      PHYSICAL EXAM:  GENERAL: NAD, well-groomed, well-developed  HEAD:  Atraumatic, Normocephalic  EYES: EOMI, PERRLA, conjunctiva and sclera clear  ENMT: No tonsillar erythema, exudates, or enlargement; Moist mucous membranes  NECK: Supple, No JVD, Normal thyroid  NERVOUS SYSTEM:  Alert & Oriented X3, Good concentration; Motor Strength 5/5 B/L upper and lower extremities  CHEST/LUNG: Clear to percussion bilaterally; No rales, rhonchi, wheezing, or rubs  HEART: Regular rate and rhythm; No murmurs, rubs, or gallops  ABDOMEN: Soft, Nontender, Nondistended; Bowel sounds present  EXTREMITIES:  2+ Peripheral Pulses, No clubbing, cyanosis, or edema. PICC upper right arm with dressing. No appreciated erythema or swelling   LYMPH: No lymphadenopathy noted  SKIN: No rashes or lesions    LABS:                  RADIOLOGY & ADDITIONAL TESTS:

## 2018-12-17 NOTE — DIETITIAN INITIAL EVALUATION ADULT. - PERTINENT MEDS FT
MEDICATIONS  (STANDING):  ALBUTerol    0.083% 2.5 milliGRAM(s) Nebulizer every 8 hours  buDESOnide 160 MICROgram(s)/formoterol 4.5 MICROgram(s) Inhaler 2 Puff(s) Inhalation two times a day  cholecalciferol 3000 Unit(s) Oral daily  colistimethate IVPB 150 milliGRAM(s) IV Intermittent two times a day  dornase diana Solution 2.5 milliGRAM(s) Inhalation two times a day  fluticasone propionate 50 MICROgram(s)/spray Nasal Spray 1 Spray(s) Both Nostrils two times a day  imipenem/cilastatin  IVPB      imipenem/cilastatin  IVPB 500 milliGRAM(s) IV Intermittent every 6 hours  loratadine 10 milliGRAM(s) Oral daily  pantoprazole    Tablet 40 milliGRAM(s) Oral before breakfast  sodium chloride 3%  Inhalation 15 milliLiter(s) Inhalation two times a day    MEDICATIONS  (PRN):  diphenhydrAMINE 25 milliGRAM(s) Oral every 6 hours PRN Rash and/or Itching

## 2018-12-17 NOTE — DIETITIAN INITIAL EVALUATION ADULT. - SOURCE
patient/RN, RN nurse manager, medical intern & resident, chart./family/significant other/other (specify)

## 2018-12-17 NOTE — DIETITIAN INITIAL EVALUATION ADULT. - OTHER INFO
Pt. endorses good appetite/PO intake.  Reports allergy to Chlorhexidine and Tegaderm (rash).  NKFA and no c/o nausea/vomiting/diarrhea/constipation or chewing/swallowing issues @ this time.  Pt. & spouse with many concerns, which were addressed and communicated to the appropriate interdisciplinary team members.  Appreciated MD orders.  Discussed food preferences.... will provide double portions.  Advised RDN remains available.

## 2018-12-17 NOTE — DISCHARGE NOTE ADULT - HOSPITAL COURSE
3F [facility] Cystic fibrosis dx'ed at 23 y/o, presenting from outpatient Pulmonologist for CF exacerbation on Day 12/14 of Meropenem and Colistin switched to Colistin and Imipenem in the setting of a history of resistant organisms with CF exacerbations. Patient was monitored on antibiotics given through the PICC line with some minimal nausea but no numbness or tingling or rash. Pulmonology was consulted and followed patient's care Sputum cultures grew normal respiratory jacquie (gram positive cocci in chains and gram neg rods). As per pulmonology patient was instructed to continue IV antibiotics for a 14 day course until 12/28/18. Patient will follow up with Dr. Hollis outpatient. Patient is hemodynamically stable and medically optimized for a safe discharge 5F [facility] Cystic fibrosis dx'ed at 23 y/o, presenting from outpatient Pulmonologist for CF exacerbation on Day 12/14 of Meropenem and Colistin switched to Colistin and Imipenem in the setting of a history of resistant organisms with CF exacerbations. Patient was monitored on antibiotics given through the PICC line with some minimal nausea but no numbness or tingling or rash. Pulmonology was consulted and followed patient's care Sputum cultures grew normal respiratory jacquie (gram positive cocci in chains and gram neg rods). As per pulmonology patient was instructed to continue IV antibiotics for a 14 day course until 12/28/18. Patient will follow up with Dr. Hollis outpatient. Patient will have a CBC and CMP every Monday sent to Dr. Madisyn Hollis. Patient will require dressing changes every 24 hours at PICC site (right upper arm). Patient is hemodynamically stable and medically optimized for a safe discharge. Discharge planning discussed with Dr. Thuy Liu.

## 2018-12-17 NOTE — DISCHARGE NOTE ADULT - PATIENT PORTAL LINK FT
You can access the [facility] Patient Portal, offered by [facility], by registering with the following website: [email or web address]

## 2018-12-17 NOTE — PROGRESS NOTE ADULT - SUBJECTIVE AND OBJECTIVE BOX
DISHA MONTERROSO 43y [id number]    Patient is a 43y old  Female who presents with a chief complaint of CF Exacerbation (17 Dec 2018 07:40)      Follow Up/CC:  ID following for CF    Interval History/ROS: no acute issues, no fever    Allergies    adhesives (Rash)  chlorhexidine containing compounds (Rash)  clindamycin (Hives)  fluoroquinolone antibiotics (Unknown)  levofloxacin (Hives)  macrolide antibiotics (Other)  penicillin (Hives)  Zithromax Z-Ivan (Hives)    Intolerances        ANTIMICROBIALS:  colistimethate IVPB 150 two times a day  imipenem/cilastatin  IVPB    imipenem/cilastatin  IVPB 500 every 6 hours      MEDICATIONS  (STANDING):  ALBUTerol    0.083% 2.5 milliGRAM(s) Nebulizer every 8 hours  buDESOnide 160 MICROgram(s)/formoterol 4.5 MICROgram(s) Inhaler 2 Puff(s) Inhalation two times a day  cholecalciferol 3000 Unit(s) Oral daily  colistimethate IVPB 150 milliGRAM(s) IV Intermittent two times a day  dornase diana Solution 2.5 milliGRAM(s) Inhalation two times a day  fluticasone propionate 50 MICROgram(s)/spray Nasal Spray 1 Spray(s) Both Nostrils two times a day  imipenem/cilastatin  IVPB      imipenem/cilastatin  IVPB 500 milliGRAM(s) IV Intermittent every 6 hours  loratadine 10 milliGRAM(s) Oral daily  pantoprazole    Tablet 40 milliGRAM(s) Oral before breakfast  sodium chloride 3%  Inhalation 15 milliLiter(s) Inhalation two times a day    MEDICATIONS  (PRN):  diphenhydrAMINE 25 milliGRAM(s) Oral every 6 hours PRN Rash and/or Itching        Vital Signs Last 24 Hrs  T(C): 36.7 (17 Dec 2018 13:08), Max: 36.7 (17 Dec 2018 13:08)  T(F): 98.1 (17 Dec 2018 13:08), Max: 98.1 (17 Dec 2018 13:08)  HR: 85 (17 Dec 2018 13:08) (73 - 87)  BP: 109/67 (17 Dec 2018 13:08) (100/67 - 120/61)  BP(mean): --  RR: 18 (17 Dec 2018 13:08) (17 - 18)  SpO2: 97% (17 Dec 2018 13:08) (97% - 100%)                  MICROBIOLOGY:  SPUTUM  12-15-18 --  --  --      SPUTUM  12-15-18 --  --  --      BLOOD PERIPHERAL  12-14-18 --  --  --      RADIOLOGY    < from: Xray Chest 1 View- PORTABLE-Urgent (12.15.18 @ 07:08) >  No change from the prior study.    < end of copied text >

## 2018-12-17 NOTE — PROGRESS NOTE ADULT - PROBLEM SELECTOR PLAN 1
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 Past history of MSSA and actrobacter  (7/16/18) and E coli (1/5/17). Recently MAC neg on AFB (7/16/18). Has been on IV Colistin 100mg BID and Meropenem 500mg Q8H. No fevers but continues to have yellow sputum.  - As per outpatient pulmonologist will give IV colistin 150mg BID x 2 weeks and IV imipenem Q6H x 2 weeks  - Continue pulmozyme and 3% hypertonic saline BID   - Continue Advair  - ACT ([place] vest and Aerobika) TID  - Sputum culture (gram + cocci in clusters and gram neg rods)  - BCx NGTD

## 2018-12-17 NOTE — DISCHARGE NOTE ADULT - CARE PLAN
Principal Discharge DX:	Cystic fibrosis exacerbation  Goal:	Management with outpatient follow up with Dr. Hollis  Assessment and plan of treatment:	You were admitted to the hospital to monitor you with antibiotics in regards to any possible reactions or side effects. You will be discharged home with a PICC line in which you will receive 2 IV antibiotics for a total of 14 days until 12/28/18. Please follow up with Dr. Hollis if you have any further questions.  Secondary Diagnosis:	Urinary tract infection  Goal:	Resolution  Assessment and plan of treatment:	You were treated with Diflucan before being admitted to the hospital for increased urinary frequency. Here in the hospital you were given a dose of Diflucan and were told to hold Symdeko for 4 days since there are interactions with the Diflucan and Symdeko. Please follow up with Dr. Hollis if you have any further questions. Principal Discharge DX:	Cystic fibrosis exacerbation  Goal:	Management with outpatient follow up with Dr. Hollis  Assessment and plan of treatment:	You were admitted to the hospital to monitor you with antibiotics in regards to any possible reactions or side effects. You will be discharged home with a PICC line in which you will receive 2 IV antibiotics for a total of 14 days until 12/28/18. You have blood draws every monday (Complete Blood Cell Count and Comprehensive Metabolic Panel) sent to Dr. Hollis. You should have your dressing change every day around the PICC site with the help of nursing. Please follow up with Dr. Hollis if you have any further questions.  Secondary Diagnosis:	Urinary tract infection  Goal:	Resolution  Assessment and plan of treatment:	You were treated with Diflucan before being admitted to the hospital for increased urinary frequency. Here in the hospital you were given a dose of Diflucan and were told to hold Symdeko for 4 days since there are interactions with the Diflucan and Symdeko. Please follow up with Dr. Hollis if you have any further questions.

## 2018-12-17 NOTE — DISCHARGE NOTE ADULT - HOME CARE AGENCY
[place] [phone]  .     RN will visit today for 3pm dosing.    RN will call you to arrange visit time.

## 2018-12-17 NOTE — DISCHARGE NOTE ADULT - ADDITIONAL INSTRUCTIONS
Please have your dressing changed every 24 hours at the PICC site with the help of nursing.  Please follow up with Dr. Hollis within the next week.     It was a real pleasure taking care of you.

## 2018-12-17 NOTE — DIETITIAN INITIAL EVALUATION ADULT. - PERTINENT LABORATORY DATA
12-15 Na137 mmol/L Glu 90 mg/dL K+ 4.2 mmol/L Cr  0.46 mg/dL<L> BUN 10 mg/dL 12-15 Phos 2.9 mg/dL 12-15 Alb 4.1 g/dL

## 2018-12-17 NOTE — DISCHARGE NOTE ADULT - PLAN OF CARE
Management with outpatient follow up with Dr. Hollis You were admitted to the hospital to monitor you with antibiotics in regards to any possible reactions or side effects. You will be discharged home with a PICC line in which you will receive 2 IV antibiotics for a total of 14 days until 12/28/18. Please follow up with Dr. Hollis if you have any further questions. Resolution You were treated with Diflucan before being admitted to the hospital for increased urinary frequency. Here in the hospital you were given a dose of Diflucan and were told to hold Symdeko for 4 days since there are interactions with the Diflucan and Symdeko. Please follow up with Dr. Hollis if you have any further questions. You were admitted to the hospital to monitor you with antibiotics in regards to any possible reactions or side effects. You will be discharged home with a PICC line in which you will receive 2 IV antibiotics for a total of 14 days until 12/28/18. You have blood draws every monday (Complete Blood Cell Count and Comprehensive Metabolic Panel) sent to Dr. Hollis. You should have your dressing change every day around the PICC site with the help of nursing. Please follow up with Dr. Hollis if you have any further questions.

## 2018-12-17 NOTE — DIETITIAN INITIAL EVALUATION ADULT. - NS AS NUTRI INTERV COLLABORAT
1)Continue Regular diet.  Double portions                      2)Obtain daily weights                        3)RDN remains available.  Tonie Sheppard, KATHRYN, CDN  pager [phone]

## 2018-12-17 NOTE — DIETITIAN INITIAL EVALUATION ADULT. - PROBLEM SELECTOR PLAN 1
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 Past history of MSSA and actrobacter  (7/16/18) and E coli (1/5/17). Recently MAC neg on AFB (7/16/18). Has been on IV Colistin 100mg BID and Meropenem 500mg Q8H. No fevers but continues to have yellow sputum.  - As per outpatient pulmonologist will give IV colistin 150mg BID x 2 weeks and IV imipenem Q8H x 2 weeks  - Continue pulmozyme and 3% hypertonic saline BID   - Continue Advair  - ACT ([place] vest and Aerobika) TID  - ID Consult in the AM  - Sputum culture (call lab tomorrow that it is a CF sputum)  - f/u labs (CBC, CMP)  - f/u blood cultures

## 2018-12-18 VITALS
HEART RATE: 81 BPM | RESPIRATION RATE: 18 BRPM | OXYGEN SATURATION: 100 % | SYSTOLIC BLOOD PRESSURE: 105 MMHG | DIASTOLIC BLOOD PRESSURE: 69 MMHG | TEMPERATURE: 99 F

## 2018-12-18 DIAGNOSIS — Z45.2 ENCOUNTER FOR ADJUSTMENT AND MANAGEMENT OF VASCULAR ACCESS DEVICE: ICD-10-CM

## 2018-12-18 DIAGNOSIS — E84.9 CYSTIC FIBROSIS, UNSPECIFIED: ICD-10-CM

## 2018-12-18 LAB — BACTERIA SPT RESP CULT: SIGNIFICANT CHANGE UP

## 2018-12-18 PROCEDURE — 99232 SBSQ HOSP IP/OBS MODERATE 35: CPT | Mod: GC

## 2018-12-18 RX ADMIN — Medication 25 MILLIGRAM(S): at 05:34

## 2018-12-18 RX ADMIN — COLISTIMETHATE SODIUM 100 MILLIGRAM(S): 150 INJECTION INTRAMUSCULAR; INTRAVENOUS at 10:27

## 2018-12-18 RX ADMIN — Medication 25 MILLIGRAM(S): at 12:17

## 2018-12-18 RX ADMIN — IMIPENEM AND CILASTATIN 100 MILLIGRAM(S): 250; 250 INJECTION, POWDER, FOR SOLUTION INTRAVENOUS at 08:35

## 2018-12-18 RX ADMIN — SODIUM CHLORIDE 4 MILLILITER(S): 9 INJECTION INTRAMUSCULAR; INTRAVENOUS; SUBCUTANEOUS at 09:41

## 2018-12-18 RX ADMIN — IMIPENEM AND CILASTATIN 100 MILLIGRAM(S): 250; 250 INJECTION, POWDER, FOR SOLUTION INTRAVENOUS at 02:26

## 2018-12-18 RX ADMIN — ALBUTEROL 2.5 MILLIGRAM(S): 90 AEROSOL, METERED ORAL at 09:39

## 2018-12-18 RX ADMIN — DORNASE ALFA 2.5 MILLIGRAM(S): 1 SOLUTION RESPIRATORY (INHALATION) at 09:51

## 2018-12-18 RX ADMIN — PANTOPRAZOLE SODIUM 40 MILLIGRAM(S): 20 TABLET, DELAYED RELEASE ORAL at 05:34

## 2018-12-18 NOTE — PROGRESS NOTE ADULT - ASSESSMENT
43F PM Cystic fibrosis dx'ed at 21 y/o, presenting from outpatient Pulmonologist for CF exacerbation on Day 12/14 of Meropenem and Colistin.
43F PM Cystic fibrosis dx'ed at 23 y/o, presenting from outpatient Pulmonologist for CF exacerbation on Day 12/14 of Meropenem and Colistin.
43F with CF diagnosed at age 22 presenting for IV antibiotics for CF exacerbation. Pt looks clinically well,.    CXR neg    - c/w imipenem and colistin -planning 2 weeks abx per pulm  - follow sputum cx and bcx x2  -care per primary and pulm teams

## 2018-12-18 NOTE — PROGRESS NOTE ADULT - PROBLEM SELECTOR PLAN 3
Diet: Regular as per patient no pancreatic insufficiency and does not take pancreatic enzymes    DVT PPx: No need (IMPROVE = 0)    Dispo: PICC line arrangement for home

## 2018-12-18 NOTE — PROGRESS NOTE ADULT - PROBLEM SELECTOR PLAN 1
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 Past history of MSSA and actrobacter  (7/16/18) and E coli (1/5/17). Recently MAC neg on AFB (7/16/18). Has been on IV Colistin 100mg BID and Meropenem 500mg Q8H. No fevers but continues to have yellow sputum.  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 Past history of MSSA and actrobacter  (7/16/18) and E coli (1/5/17). Recently MAC neg on AFB (7/16/18). Has been on IV Colistin 100mg BID and Meropenem 500mg Q8H. No fevers but continues to have yellow sputum.  - As per outpatient pulmonologist will give IV colistin 150mg BID x 2 weeks and IV imipenem Q6H x 2 weeks  - Continue pulmozyme and 3% hypertonic saline BID   - Continue Advair  - ACT ([place] vest and Aerobika) TID  - Sputum culture (gram + cocci in clusters and gram neg rods) which were normal respiratory jacquie  - BCx NGTD

## 2018-12-18 NOTE — PROGRESS NOTE ADULT - PROBLEM SELECTOR PLAN 2
Reportedly has UTI was given Diflucan  - UA negative   - Assess if there is skin involvement Reportedly has UTI was given Diflucan  - UA negative   - s/p dose of diflucan instructed patient to hold Symdeko for 4 days given drug interaction with diflucan

## 2018-12-18 NOTE — PROGRESS NOTE ADULT - ATTENDING COMMENTS
CF exacerbation, Achromobacter infection.   Continue IV Colistin and Meropenem.  Continue airway clearance therapies.  Had some itching and mild burning at PICC line dressing site. Improved with Benadryl - no major rashes noted.  Will continue to monitor.
CF exacerbation, Achromobacter infection.   Continue IV Colistin and Meropenem.  Continue airway clearance therapies.  Patient allergic to Chloraprep but was used to change dressing today.  Not feeling any pain, burning, or itching currently but will monitor.
will give IV colistin 150mg BID x 2 weeks and IV imipenem Q6H x 2 weeks for pulmonary exacerbation of cf.
Problem: Cystic fibrosis exacerbation.  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 Past history of MSSA and actrobacter  (7/16/18) and E coli (1/5/17). Recently MAC neg on AFB (7/16/18). Has been on IV Colistin 100mg BID and Meropenem 500mg Q8H. No fevers but continues to have yellow sputum.  - will give IV colistin 150mg BID x 2 weeks and IV imipenem Q6H x 2 weeks  - Continue pulmozyme and 3% hypertonic saline BID   - Continue Advair  - ACT ([place] vest and Aerobika) TID  - Sputum culture (gram + cocci in clusters and gram neg rods) which were normal respiratory jacquie  - BCx NGTD.
Jesus Alberto Matias  Attending Physician   Division of Infectious Disease  Pager #[phone]  After 5pm/weekend or no response, call #[phone]

## 2018-12-18 NOTE — PROGRESS NOTE ADULT - SUBJECTIVE AND OBJECTIVE BOX
Shireen Vuongscottie, PGY1  Pager: [phone]  [facility]: [phone]    CHIEF COMPLAINT: Patient is a 43y old  Female who presents with a chief complaint of CF Exacerbation (17 Dec 2018 15:14)      INTERVAL HPI/OVERNIGHT EVENTS:  No acute events overnight.     MEDICATIONS (STANDING):  ALBUTerol    0.083% 2.5 milliGRAM(s) Nebulizer every 8 hours  buDESOnide 160 MICROgram(s)/formoterol 4.5 MICROgram(s) Inhaler 2 Puff(s) Inhalation two times a day  cholecalciferol 3000 Unit(s) Oral daily  colistimethate IVPB 150 milliGRAM(s) IV Intermittent two times a day  dornase diana Solution 2.5 milliGRAM(s) Inhalation two times a day  fluticasone propionate 50 MICROgram(s)/spray Nasal Spray 1 Spray(s) Both Nostrils two times a day  imipenem/cilastatin  IVPB      imipenem/cilastatin  IVPB 500 milliGRAM(s) IV Intermittent every 6 hours  loratadine 10 milliGRAM(s) Oral daily  pantoprazole    Tablet 40 milliGRAM(s) Oral before breakfast  sodium chloride 3%  Inhalation 15 milliLiter(s) Inhalation two times a day    MEDICATIONS  (PRN):  diphenhydrAMINE 25 milliGRAM(s) Oral every 6 hours PRN      REVIEW OF SYSTEMS:  CONSTITUTIONAL: No fever, weight loss, or fatigue  EYES: No eye pain, visual disturbances, or discharge  ENMT:  No difficulty hearing, tinnitus, vertigo; No sinus or throat pain  NECK: No pain or stiffness  RESPIRATORY: No cough, wheezing, chills or hemoptysis; No shortness of breath  CARDIOVASCULAR: No chest pain, palpitations, dizziness, or leg swelling  GASTROINTESTINAL: No abdominal or epigastric pain. No nausea, vomiting, or hematemesis; No diarrhea or constipation. No melena or hematochezia.  GENITOURINARY: No dysuria, frequency, hematuria, or incontinence  NEUROLOGICAL: No headaches, memory loss, loss of strength, numbness, or tremors  SKIN: No itching, burning, rashes, or lesions   MUSCULOSKELETAL: No joint pain or swelling; No muscle, back, or extremity pain    T(F): 97.6 (12-18-18 @ 06:35), Max: 98.1 (12-17-18 @ 13:08)  HR: 79 (12-18-18 @ 06:35) (69 - 85)  BP: 107/66 (12-18-18 @ 06:35) (107/62 - 109/67)  RR: 18 (12-18-18 @ 06:35) (17 - 18)  SpO2: 98% (12-18-18 @ 06:35) (96% - 100%)  Wt(kg): --  CAPILLARY BLOOD GLUCOSE        I&O's Summary      PHYSICAL EXAM:  GENERAL: NAD, well-groomed, well-developed  HEAD:  Atraumatic, Normocephalic  EYES: EOMI, PERRLA, conjunctiva and sclera clear  ENMT: No tonsillar erythema, exudates, or enlargement; Moist mucous membranes  NECK: Supple, No JVD, Normal thyroid  NERVOUS SYSTEM:  Alert & Oriented X3, Good concentration; Motor Strength 5/5 B/L upper and lower extremities  CHEST/LUNG: Clear to percussion bilaterally; No rales, rhonchi, wheezing, or rubs  HEART: Regular rate and rhythm; No murmurs, rubs, or gallops  ABDOMEN: Soft, Nontender, Nondistended; Bowel sounds present  EXTREMITIES:  2+ Peripheral Pulses, No clubbing, cyanosis, or edema. PICC upper right arm with dressing. No appreciated erythema or swelling   LYMPH: No lymphadenopathy noted  SKIN: No rashes or lesions    LABS:                  RADIOLOGY & ADDITIONAL TESTS: Shireen Vuongscottie, PGY1  Pager: [phone]  [facility]: [phone]    CHIEF COMPLAINT: Patient is a 43y old  Female who presents with a chief complaint of CF Exacerbation (17 Dec 2018 15:14)      INTERVAL HPI/OVERNIGHT EVENTS:  No acute events overnight. Patient seen at bedside. No drug reactions thus far.     MEDICATIONS (STANDING):  ALBUTerol    0.083% 2.5 milliGRAM(s) Nebulizer every 8 hours  buDESOnide 160 MICROgram(s)/formoterol 4.5 MICROgram(s) Inhaler 2 Puff(s) Inhalation two times a day  cholecalciferol 3000 Unit(s) Oral daily  colistimethate IVPB 150 milliGRAM(s) IV Intermittent two times a day  dornase diana Solution 2.5 milliGRAM(s) Inhalation two times a day  fluticasone propionate 50 MICROgram(s)/spray Nasal Spray 1 Spray(s) Both Nostrils two times a day  imipenem/cilastatin  IVPB      imipenem/cilastatin  IVPB 500 milliGRAM(s) IV Intermittent every 6 hours  loratadine 10 milliGRAM(s) Oral daily  pantoprazole    Tablet 40 milliGRAM(s) Oral before breakfast  sodium chloride 3%  Inhalation 15 milliLiter(s) Inhalation two times a day    MEDICATIONS  (PRN):  diphenhydrAMINE 25 milliGRAM(s) Oral every 6 hours PRN      REVIEW OF SYSTEMS:  CONSTITUTIONAL: No fever, weight loss, or fatigue  EYES: No eye pain, visual disturbances, or discharge  ENMT:  No difficulty hearing, tinnitus, vertigo; No sinus or throat pain  NECK: No pain or stiffness  RESPIRATORY: No cough, wheezing, chills or hemoptysis; No shortness of breath  CARDIOVASCULAR: No chest pain, palpitations, dizziness, or leg swelling  GASTROINTESTINAL: No abdominal or epigastric pain. No nausea, vomiting, or hematemesis; No diarrhea or constipation. No melena or hematochezia.  GENITOURINARY: No dysuria, frequency, hematuria, or incontinence  NEUROLOGICAL: No headaches, memory loss, loss of strength, numbness, or tremors  SKIN: No itching, burning, rashes, or lesions   MUSCULOSKELETAL: No joint pain or swelling; No muscle, back, or extremity pain    T(F): 97.6 (12-18-18 @ 06:35), Max: 98.1 (12-17-18 @ 13:08)  HR: 79 (12-18-18 @ 06:35) (69 - 85)  BP: 107/66 (12-18-18 @ 06:35) (107/62 - 109/67)  RR: 18 (12-18-18 @ 06:35) (17 - 18)  SpO2: 98% (12-18-18 @ 06:35) (96% - 100%)  Wt(kg): --  CAPILLARY BLOOD GLUCOSE        I&O's Summary      PHYSICAL EXAM:  GENERAL: NAD, well-groomed, well-developed  HEAD:  Atraumatic, Normocephalic  EYES: EOMI, PERRLA, conjunctiva and sclera clear  ENMT: No tonsillar erythema, exudates, or enlargement; Moist mucous membranes  NECK: Supple, No JVD, Normal thyroid  NERVOUS SYSTEM:  Alert & Oriented X3, Good concentration; Motor Strength 5/5 B/L upper and lower extremities  CHEST/LUNG: Clear to percussion bilaterally; No rales, rhonchi, wheezing, or rubs  HEART: Regular rate and rhythm; No murmurs, rubs, or gallops  ABDOMEN: Soft, Nontender, Nondistended; Bowel sounds present  EXTREMITIES:  2+ Peripheral Pulses, No clubbing, cyanosis, or edema. PICC upper right arm with dressing. No appreciated erythema or swelling   LYMPH: No lymphadenopathy noted  SKIN: No rashes or lesions    LABS:                  RADIOLOGY & ADDITIONAL TESTS:

## 2018-12-19 LAB
BACTERIA BLD CULT: SIGNIFICANT CHANGE UP
BACTERIA BLD CULT: SIGNIFICANT CHANGE UP

## 2018-12-21 ENCOUNTER — MEDICATION RENEWAL (OUTPATIENT)
Age: 43
End: 2018-12-21

## 2018-12-25 ENCOUNTER — TRANSCRIPTION ENCOUNTER (OUTPATIENT)
Age: 43
End: 2018-12-25

## 2018-12-27 ENCOUNTER — APPOINTMENT (OUTPATIENT)
Dept: PULMONOLOGY | Facility: CLINIC | Age: 43
End: 2018-12-27
Payer: COMMERCIAL

## 2018-12-27 VITALS
HEART RATE: 67 BPM | BODY MASS INDEX: 29.05 KG/M2 | HEIGHT: 63.5 IN | SYSTOLIC BLOOD PRESSURE: 113 MMHG | OXYGEN SATURATION: 98 % | TEMPERATURE: 97.6 F | WEIGHT: 166 LBS | RESPIRATION RATE: 16 BRPM | DIASTOLIC BLOOD PRESSURE: 72 MMHG

## 2018-12-27 VITALS — HEIGHT: 63.5 IN | WEIGHT: 166 LBS | BODY MASS INDEX: 29.05 KG/M2

## 2018-12-27 PROCEDURE — 94010 BREATHING CAPACITY TEST: CPT

## 2018-12-27 PROCEDURE — ZZZZZ: CPT

## 2018-12-27 PROCEDURE — 99215 OFFICE O/P EST HI 40 MIN: CPT | Mod: 25

## 2018-12-28 ENCOUNTER — MEDICATION RENEWAL (OUTPATIENT)
Age: 43
End: 2018-12-28

## 2019-01-09 ENCOUNTER — MEDICATION RENEWAL (OUTPATIENT)
Age: 44
End: 2019-01-09

## 2019-01-30 LAB — RHODAMINE-AURAMINE STN SPEC: NORMAL

## 2019-02-04 ENCOUNTER — FORM ENCOUNTER (OUTPATIENT)
Age: 44
End: 2019-02-04

## 2019-02-05 ENCOUNTER — OUTPATIENT (OUTPATIENT)
Dept: OUTPATIENT SERVICES | Facility: HOSPITAL | Age: 44
LOS: 1 days | End: 2019-02-05
Payer: COMMERCIAL

## 2019-02-05 ENCOUNTER — APPOINTMENT (OUTPATIENT)
Dept: RADIOLOGY | Facility: IMAGING CENTER | Age: 44
End: 2019-02-05
Payer: COMMERCIAL

## 2019-02-05 ENCOUNTER — APPOINTMENT (OUTPATIENT)
Dept: PULMONOLOGY | Facility: CLINIC | Age: 44
End: 2019-02-05
Payer: COMMERCIAL

## 2019-02-05 VITALS
HEIGHT: 63.5 IN | DIASTOLIC BLOOD PRESSURE: 67 MMHG | BODY MASS INDEX: 29.75 KG/M2 | TEMPERATURE: 99 F | OXYGEN SATURATION: 97 % | RESPIRATION RATE: 18 BRPM | WEIGHT: 170 LBS | SYSTOLIC BLOOD PRESSURE: 124 MMHG | HEART RATE: 80 BPM

## 2019-02-05 DIAGNOSIS — E84.9 CYSTIC FIBROSIS, UNSPECIFIED: ICD-10-CM

## 2019-02-05 PROCEDURE — 71046 X-RAY EXAM CHEST 2 VIEWS: CPT

## 2019-02-05 PROCEDURE — 71046 X-RAY EXAM CHEST 2 VIEWS: CPT | Mod: 26

## 2019-02-05 PROCEDURE — 99215 OFFICE O/P EST HI 40 MIN: CPT

## 2019-02-06 ENCOUNTER — RESULT REVIEW (OUTPATIENT)
Age: 44
End: 2019-02-06

## 2019-02-06 LAB
ALBUMIN SERPL ELPH-MCNC: 4.5 G/DL
ALP BLD-CCNC: 67 U/L
ALT SERPL-CCNC: 10 U/L
ANION GAP SERPL CALC-SCNC: 11 MMOL/L
AST SERPL-CCNC: 16 U/L
BASOPHILS # BLD AUTO: 0.06 K/UL
BASOPHILS NFR BLD AUTO: 0.9 %
BILIRUB SERPL-MCNC: 0.4 MG/DL
BUN SERPL-MCNC: 11 MG/DL
CALCIUM SERPL-MCNC: 9.6 MG/DL
CHLORIDE SERPL-SCNC: 102 MMOL/L
CO2 SERPL-SCNC: 25 MMOL/L
CREAT SERPL-MCNC: 0.44 MG/DL
EOSINOPHIL # BLD AUTO: 0.34 K/UL
EOSINOPHIL NFR BLD AUTO: 4.8 %
GLUCOSE SERPL-MCNC: 82 MG/DL
HCT VFR BLD CALC: 38.7 %
HGB BLD-MCNC: 11.8 G/DL
IMM GRANULOCYTES NFR BLD AUTO: 0.1 %
INR PPP: 1.02 RATIO
LYMPHOCYTES # BLD AUTO: 1.28 K/UL
LYMPHOCYTES NFR BLD AUTO: 18.2 %
MAN DIFF?: NORMAL
MCHC RBC-ENTMCNC: 29.1 PG
MCHC RBC-ENTMCNC: 30.5 GM/DL
MCV RBC AUTO: 95.3 FL
MONOCYTES # BLD AUTO: 0.52 K/UL
MONOCYTES NFR BLD AUTO: 7.4 %
NEUTROPHILS # BLD AUTO: 4.84 K/UL
NEUTROPHILS NFR BLD AUTO: 68.6 %
PLATELET # BLD AUTO: 327 K/UL
POTASSIUM SERPL-SCNC: 4.2 MMOL/L
PROT SERPL-MCNC: 8 G/DL
PT BLD: 11.4 SEC
RBC # BLD: 4.06 M/UL
RBC # FLD: 15.2 %
SODIUM SERPL-SCNC: 138 MMOL/L
WBC # FLD AUTO: 7.05 K/UL

## 2019-02-06 NOTE — HISTORY OF PRESENT ILLNESS
[3  -  Moderate] : 3, moderate [MSSA] : MSSA [Other: ___] : [unfilled] [___] : the last positive MSSA result was [unfilled] [Last AFB Date: ___] : the AFB was performed  [unfilled] [Daily] : daily cough reported [< 1/4 cup] : less than 1/4 cup of [None] : ~He/She~ has no hemoptysis [FVC ___] : the FVC was [unfilled] liters [FEV1: ___] : the FEV1 was [unfilled] liters [] : gilberto suárez [Good] : good [Headache] : headache [Congestion] : nasal congestion [Pancreatic Insufficiency] : pancreatic insufficiency [Pancreatic Enzyme Supp.] : uses pancreatic enzyme supplements [Osteopenia] : osteopenia [HgA1C Value: ___] : HgA1C value was [unfilled]  [Date: ___] : on [unfilled] [Normal] : OGTT results were normal [AFB] : the patient had a MAC negative AFB [Sinus Pain] : no sinus pain [Pancreatitis] : no pancreatitis [Diarrhea] : no diarrhea [Constipation] : no constipation [Steatorrhea] : no steatorrhea [CFRD] : no CFRD [de-identified] : This is a 42 y/o female CF pt who was dx'ed CF at 23 y/o, Pt's younger sister was dx'ed CF at birth. Pt was sweat tested at age 5 and family was told she has false positive sweat test then. Pt grew up having frequent pneumonia, episodes of hemoptysis and recurrent sinus infection. 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 Sweat test on 10/12/98 was 65.3. Pt never had stool Pancrease testing and she does not have s/s of PI. + chronic sinusitis and 2 sinus surgeries, last was 2012.\par Been on Kalydeco since 2014 and reports increased energy level, decreased cough, decreased exacerbation and frequency; prior to Kalydeco, was using IV antibiotics and having exacerbations every 2-3 months along with sinus infections.\par Post Kalydeco, she gets 1-2 exacerbations needed PO antibiotics.\par On Symdeko 4/2018 doing well\par \par PULM:\par Most recent CF exacerbation 11/29 to 12/27, treated with the following:\par colistin 100mg IV Q12H\par Meropenem 500mg IV\par Imipenem 1gm IV Q6H\par Most likely developed an allergy to chlorahexadine during this exacerbation, pt developed erythema, hives, and pruritus after having PICC cleaned with chlorahexadine. Allergy's updated and iodine used for further dressing changes.\par \par Here today for a sick visit.  Starting this past weekend pt has been experiencing increased CARDENAS, unable to walk 2 blocks without becoming significantly SOB and needing a long recovery time.  Also noted increase in right lower lung pain, described as a dull ache/tightness, 5-6/10 consistent pain, pt feels that she "can't take a deep breath".  \par \par ACT:\par using 3% hypertonic saline and Pulmozyme BID. She does not tolerate 7% hypersal. Bikes 30 min/day\par acapella.- when traveling for work as , has not returned to work\par [place] vest and using it 30 minutes BID, however turned off vest on right lower side due to CP\par Completed Alonso oncylce today /OFF Colistin\par \par ENT: increased sinus symptoms, HA and sinus congestion.  Taking Excedrin daily and Dymista as ordered.\par \par Transplant: was evaluated at [facility] 1 week ago, f/u with requested testing\par  [de-identified] : Fasting 86 2hr 86 [FreeTextEntry1] : \par

## 2019-02-06 NOTE — REVIEW OF SYSTEMS
[Feeling Poorly] : feeling poorly [Feeling Tired] : feeling tired [Wheezing] : wheezing [Cough] : cough [SOB on Exertion] : shortness of breath during exertion [see HPI] : see HPI [Negative] : Heme/Lymph [Fever] : no fever [Chills] : no chills [Nasal Discharge] : no nasal discharge [Shortness Of Breath] : no shortness of breath [PND] : no PND

## 2019-02-06 NOTE — PHYSICAL EXAM
[General Appearance - Well Developed] : well developed [Normal Appearance] : normal appearance [No Deformities] : no deformities [General Appearance - In No Acute Distress] : no acute distress [Normal Conjunctiva] : the conjunctiva exhibited no abnormalities [Eyelids - No Xanthelasma] : the eyelids demonstrated no xanthelasmas [Neck Appearance] : the appearance of the neck was normal [Neck Cervical Mass (___cm)] : no neck mass was observed [Jugular Venous Distention Increased] : there was no jugular-venous distention [Thyroid Diffuse Enlargement] : the thyroid was not enlarged [Thyroid Nodule] : there were no palpable thyroid nodules [Heart Rate And Rhythm] : heart rate was normal and rhythm regular [Heart Sounds] : normal S1 and S2 [Heart Sounds Gallop] : no gallops [Murmurs] : no murmurs [Heart Sounds Pericardial Friction Rub] : no pericardial rub [Respiration, Rhythm And Depth] : normal respiratory rhythm and effort [Exaggerated Use Of Accessory Muscles For Inspiration] : no accessory muscle use [Bowel Sounds] : normal bowel sounds [Abdomen Soft] : soft [Abdomen Tenderness] : non-tender [Abdomen Mass (___ Cm)] : no abdominal mass palpated [Abnormal Walk] : normal gait [Musculoskeletal - Swelling] : no joint swelling seen [Motor Tone] : muscle strength and tone were normal [Cyanosis, Localized] : no localized cyanosis [Petechial Hemorrhages (___cm)] : no petechial hemorrhages [Skin Color & Pigmentation] : normal skin color and pigmentation [Skin Turgor] : normal skin turgor [] : no rash [Sensation] : the sensory exam was normal to light touch and pinprick [No Focal Deficits] : no focal deficits [Oriented To Time, Place, And Person] : oriented to person, place, and time [Affect] : the affect was normal [FreeTextEntry1] : digital clubbing

## 2019-02-06 NOTE — ASSESSMENT
[FreeTextEntry1] : Patient is a 42 y/o female CF pt who was dx'ed CF at 21 y/o, Pt's younger sister was dx'ed CF at birth. Pt was sweat tested at age 5 and family was told she has false positive sweat test then. Pt grew up having frequent pneumonia, episodes of hemoptysis and recurrent sinus infection. 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 Sweat test on 10/12/98 was 65.3. \par \par PULM:\par Most recent CF exacerbation 11/29 to 12/27, treated with the following:\par colistin 100mg IV Q12H\par Meropenem 500mg IV\par Imipenem 1gm IV Q6H\par Most likely developed an allergy to chlorahexadine during this exacerbation, pt developed erythema, hives, and pruritus after having PICC cleaned with chlorahexadine. Allergy's updated and iodine used for further dressing changes.\par \par Here today for a sick visit, C/W CF EXACERBATION.  Starting this past weekend pt has been experiencing increased CARDENAS, unable to walk 2 blocks without becoming significantly SOB and needing a long recovery time.  Also noted increase in right lower lung pain, described as a dull ache/tightness, 5-6/10 consistent pain, pt feels that she "can't take a deep breath".  \par \par FeV1 31% (8/2018), FEV1 29% 3/27/18, lower than baseline. FEV1 has been 32 - 33% in 2016; 39% in 2015. 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 coli. \par \par CF exacerbation:Achromobacter, MSSA\par - CXR: CXR - opacity in RML, could be atelectsis vs PNA - advised to increase ACT as tolerated, take ibuprofen to tolerate vest. no PTX. \par    11/27/18 - no PTX or new infiltrate. Chronic changes of CF. \par - PICC placement at [place]\par - Plan for IV Bactrim 4 mg/kg BID, colistin 150 mg IV Q12\par - overnight oximetry\par - check CT chest\par - prednisone 20 mg daily.\par \par Social: pt has not returned to work since last exacerbation.  Unsure if she will return due to disease progression/severity.  Discussing with  [place] option of disability and insurance coverage.  Referred to CF legal to help with this process.  Provided emotional support as she processes these changes.\par \par LUNG TRANSPLANT - evaluated by [facility] last week, is f/u with required testing\par \par Finished on cycle today of HORTENSIA (gives horse voice , resolves when she doesn't use it, asked pt to gargle and spit after nebs), alternating with inhaled COLISTIN \par ACT adherence is improved, using [place] VEST BID and Aerobika. increase ACT to TID. \par Continue 3% hypertonic saline and Pulmozyme once BID. She does not tolerate 7% hypersal.\par Continue Advair.\par \par ALLERGY - pt had skin testing and is NOT allergic to CIPRO/LEVAQUIN, CLINDAMYCIN. \par PER Allergy:\par There is still a risk of delayed reactions with these antibiotics. If treatment with any of these antibiotics is indicated and no adequate alternatives are available, can start antibiotics, along with daily nonsedating oral antihistamines such as cetirizine and Montelukast. Continue cetirizine and Montelukast for 5 days after antibiotic is completed. \par \par AVOID  Avelox (more severe cutaneous reaction) and Minocycline (there are no nonirritating concentration for skin test available).\par \par MODULATOR - Tolerating symdeko for 4 months now, started in 4/2018. F/U LFTS\par \par ENT - chronic sinusitis - had followup with ENT. Sinuses clear, no need for surgery.\par PND - Continue Dymista, likes more than flonase\par \par Endocrine - OGTT - normal, this year repeat annually.\par Bone density demonstrated osteopenia, Vitamin D deficiency - patient on vitamin D 4000 IU. Not on Calcium recommended to take OTC MVI\par \par Heme - anemia. Hbg 8.9 from 11 IN 2016 last year. Started Niferix, repeat blood work improved hgb WNL\par GI - Still needs to f/u with Dr. Gore for routine colonoscopy. \par \par \par \par

## 2019-02-07 ENCOUNTER — FORM ENCOUNTER (OUTPATIENT)
Age: 44
End: 2019-02-07

## 2019-02-08 ENCOUNTER — APPOINTMENT (OUTPATIENT)
Dept: CT IMAGING | Facility: IMAGING CENTER | Age: 44
End: 2019-02-08
Payer: COMMERCIAL

## 2019-02-08 ENCOUNTER — OUTPATIENT (OUTPATIENT)
Dept: OUTPATIENT SERVICES | Facility: HOSPITAL | Age: 44
LOS: 1 days | End: 2019-02-08
Payer: COMMERCIAL

## 2019-02-08 DIAGNOSIS — R84.9 UNSPECIFIED ABNORMAL FINDING IN SPECIMENS FROM RESPIRATORY ORGANS AND THORAX: ICD-10-CM

## 2019-02-08 DIAGNOSIS — E84.9 CYSTIC FIBROSIS, UNSPECIFIED: ICD-10-CM

## 2019-02-08 PROCEDURE — 71250 CT THORAX DX C-: CPT | Mod: 26

## 2019-02-08 PROCEDURE — 36573 INSJ PICC RS&I 5 YR+: CPT

## 2019-02-08 PROCEDURE — 71250 CT THORAX DX C-: CPT

## 2019-02-08 PROCEDURE — C1751: CPT

## 2019-02-12 ENCOUNTER — MEDICATION RENEWAL (OUTPATIENT)
Age: 44
End: 2019-02-12

## 2019-02-12 DIAGNOSIS — Z45.2 ENCOUNTER FOR ADJUSTMENT AND MANAGEMENT OF VASCULAR ACCESS DEVICE: ICD-10-CM

## 2019-02-12 DIAGNOSIS — E84.9 CYSTIC FIBROSIS, UNSPECIFIED: ICD-10-CM

## 2019-02-14 ENCOUNTER — INPATIENT (INPATIENT)
Facility: HOSPITAL | Age: 44
LOS: 7 days | Discharge: HOME | End: 2019-02-22
Attending: STUDENT IN AN ORGANIZED HEALTH CARE EDUCATION/TRAINING PROGRAM | Admitting: STUDENT IN AN ORGANIZED HEALTH CARE EDUCATION/TRAINING PROGRAM
Payer: COMMERCIAL

## 2019-02-14 VITALS
RESPIRATION RATE: 18 BRPM | SYSTOLIC BLOOD PRESSURE: 114 MMHG | DIASTOLIC BLOOD PRESSURE: 59 MMHG | OXYGEN SATURATION: 96 % | WEIGHT: 165.13 LBS | HEART RATE: 80 BPM | HEIGHT: 64 IN | TEMPERATURE: 98 F

## 2019-02-14 DIAGNOSIS — E84.0 CYSTIC FIBROSIS WITH PULMONARY MANIFESTATIONS: ICD-10-CM

## 2019-02-14 DIAGNOSIS — T78.40XA ALLERGY, UNSPECIFIED, INITIAL ENCOUNTER: ICD-10-CM

## 2019-02-14 DIAGNOSIS — E84.9 CYSTIC FIBROSIS, UNSPECIFIED: ICD-10-CM

## 2019-02-14 DIAGNOSIS — Z29.9 ENCOUNTER FOR PROPHYLACTIC MEASURES, UNSPECIFIED: ICD-10-CM

## 2019-02-14 LAB
ALBUMIN SERPL ELPH-MCNC: 4.5 G/DL — SIGNIFICANT CHANGE UP (ref 3.3–5)
ALP SERPL-CCNC: 63 U/L — SIGNIFICANT CHANGE UP (ref 40–120)
ALT FLD-CCNC: 17 U/L — SIGNIFICANT CHANGE UP (ref 4–33)
ANION GAP SERPL CALC-SCNC: 16 MMO/L — HIGH (ref 7–14)
AST SERPL-CCNC: 18 U/L — SIGNIFICANT CHANGE UP (ref 4–32)
BILIRUB SERPL-MCNC: 0.3 MG/DL — SIGNIFICANT CHANGE UP (ref 0.2–1.2)
BUN SERPL-MCNC: 12 MG/DL — SIGNIFICANT CHANGE UP (ref 7–23)
CALCIUM SERPL-MCNC: 9.3 MG/DL — SIGNIFICANT CHANGE UP (ref 8.4–10.5)
CHLORIDE SERPL-SCNC: 98 MMOL/L — SIGNIFICANT CHANGE UP (ref 98–107)
CO2 SERPL-SCNC: 23 MMOL/L — SIGNIFICANT CHANGE UP (ref 22–31)
CREAT SERPL-MCNC: 0.7 MG/DL — SIGNIFICANT CHANGE UP (ref 0.5–1.3)
GLUCOSE SERPL-MCNC: 104 MG/DL — HIGH (ref 70–99)
HCT VFR BLD CALC: 35.2 % — SIGNIFICANT CHANGE UP (ref 34.5–45)
HGB BLD-MCNC: 11.3 G/DL — LOW (ref 11.5–15.5)
MAGNESIUM SERPL-MCNC: 2 MG/DL — SIGNIFICANT CHANGE UP (ref 1.6–2.6)
MCHC RBC-ENTMCNC: 29.7 PG — SIGNIFICANT CHANGE UP (ref 27–34)
MCHC RBC-ENTMCNC: 32.1 % — SIGNIFICANT CHANGE UP (ref 32–36)
MCV RBC AUTO: 92.6 FL — SIGNIFICANT CHANGE UP (ref 80–100)
NRBC # FLD: 0 K/UL — LOW (ref 25–125)
PHOSPHATE SERPL-MCNC: 3.4 MG/DL — SIGNIFICANT CHANGE UP (ref 2.5–4.5)
PLATELET # BLD AUTO: 387 K/UL — SIGNIFICANT CHANGE UP (ref 150–400)
PMV BLD: 9 FL — SIGNIFICANT CHANGE UP (ref 7–13)
POTASSIUM SERPL-MCNC: 4.2 MMOL/L — SIGNIFICANT CHANGE UP (ref 3.5–5.3)
POTASSIUM SERPL-SCNC: 4.2 MMOL/L — SIGNIFICANT CHANGE UP (ref 3.5–5.3)
PROT SERPL-MCNC: 7.6 G/DL — SIGNIFICANT CHANGE UP (ref 6–8.3)
RBC # BLD: 3.8 M/UL — SIGNIFICANT CHANGE UP (ref 3.8–5.2)
RBC # FLD: 14.2 % — SIGNIFICANT CHANGE UP (ref 10.3–14.5)
SODIUM SERPL-SCNC: 137 MMOL/L — SIGNIFICANT CHANGE UP (ref 135–145)
WBC # BLD: 11.3 K/UL — HIGH (ref 3.8–10.5)
WBC # FLD AUTO: 11.3 K/UL — HIGH (ref 3.8–10.5)

## 2019-02-14 RX ORDER — SODIUM CHLORIDE 0.65 %
1 AEROSOL, SPRAY (ML) NASAL
Qty: 0 | Refills: 0 | COMMUNITY

## 2019-02-14 RX ORDER — ALBUTEROL 90 UG/1
1 AEROSOL, METERED ORAL EVERY 4 HOURS
Qty: 0 | Refills: 0 | Status: DISCONTINUED | OUTPATIENT
Start: 2019-02-14 | End: 2019-02-22

## 2019-02-14 RX ORDER — FAMOTIDINE 10 MG/ML
40 INJECTION INTRAVENOUS
Qty: 0 | Refills: 0 | Status: DISCONTINUED | OUTPATIENT
Start: 2019-02-14 | End: 2019-02-21

## 2019-02-14 RX ORDER — SODIUM CHLORIDE 9 MG/ML
5 INJECTION INTRAMUSCULAR; INTRAVENOUS; SUBCUTANEOUS
Qty: 0 | Refills: 0 | Status: DISCONTINUED | OUTPATIENT
Start: 2019-02-14 | End: 2019-02-20

## 2019-02-14 RX ORDER — DIPHENHYDRAMINE HCL 50 MG
50 CAPSULE ORAL EVERY 6 HOURS
Qty: 0 | Refills: 0 | Status: DISCONTINUED | OUTPATIENT
Start: 2019-02-14 | End: 2019-02-22

## 2019-02-14 RX ORDER — DORNASE ALFA 1 MG/ML
2.5 SOLUTION RESPIRATORY (INHALATION)
Qty: 0 | Refills: 0 | Status: DISCONTINUED | OUTPATIENT
Start: 2019-02-14 | End: 2019-02-22

## 2019-02-14 RX ORDER — ALBUTEROL 90 UG/1
2.5 AEROSOL, METERED ORAL EVERY 6 HOURS
Qty: 0 | Refills: 0 | Status: DISCONTINUED | OUTPATIENT
Start: 2019-02-14 | End: 2019-02-22

## 2019-02-14 RX ORDER — BUDESONIDE AND FORMOTEROL FUMARATE DIHYDRATE 160; 4.5 UG/1; UG/1
2 AEROSOL RESPIRATORY (INHALATION)
Qty: 0 | Refills: 0 | Status: DISCONTINUED | OUTPATIENT
Start: 2019-02-14 | End: 2019-02-20

## 2019-02-14 RX ADMIN — SODIUM CHLORIDE 4 MILLILITER(S): 9 INJECTION INTRAMUSCULAR; INTRAVENOUS; SUBCUTANEOUS at 22:05

## 2019-02-14 RX ADMIN — Medication 50 MILLIGRAM(S): at 19:55

## 2019-02-14 RX ADMIN — ALBUTEROL 2.5 MILLIGRAM(S): 90 AEROSOL, METERED ORAL at 21:59

## 2019-02-14 RX ADMIN — FAMOTIDINE 40 MILLIGRAM(S): 10 INJECTION INTRAVENOUS at 19:55

## 2019-02-14 RX ADMIN — DORNASE ALFA 2.5 MILLIGRAM(S): 1 SOLUTION RESPIRATORY (INHALATION) at 21:59

## 2019-02-14 NOTE — H&P ADULT - PROBLEM SELECTOR PLAN 2
Pt afebrile without respiratory distress/increased work of breathing on presentation.   - will hold off on antibiotics at this time given recent allergic reaction  - albuterol nebulizer Q6H, hypersaline 7% BID, pulmozyme BID  - chest vest  - PT consult  - c/w home advair  - send sputum culture

## 2019-02-14 NOTE — H&P ADULT - NSHPREVIEWOFSYSTEMS_GEN_ALL_CORE
REVIEW OF SYSTEMS:    CONSTITUTIONAL: No weakness, fevers or chills  EYES/ENT: No visual changes;  No vertigo or throat pain   NECK: No pain or stiffness  RESPIRATORY: +cough, No wheezing, hemoptysis; No shortness of breath  CARDIOVASCULAR: No chest pain or palpitations  GASTROINTESTINAL: No abdominal or epigastric pain. No nausea, vomiting, or hematemesis; No diarrhea or constipation. No melena or hematochezia.  GENITOURINARY: No dysuria, frequency or hematuria  NEUROLOGICAL: No numbness or weakness  SKIN: No itching, burning, rashes, or lesions   All other review of systems is negative unless indicated above.

## 2019-02-14 NOTE — H&P ADULT - PROBLEM SELECTOR PLAN 1
Globus sensation in throat with difficulty swallowing during antibiotic infusion in pt with multiple drug allergies concerning for allergic reaction to bactrim vs colistin vs imipenem. Pt does not have hx of reaction to sulfa drugs before. Currently asymptomatic, saturating well on RA.   - c/w benadryl 50mg Q6H, pepcid 40mg BID, prednisone 40 QD  - allergy consult in AM for sulfa desensitization protocol, will need MICU bed when one becomes available  - ENT consult Globus sensation in throat with difficulty swallowing during antibiotic infusion in pt with multiple drug allergies concerning for allergic reaction to bactrim vs colistin vs imipenem. Pt does not have hx of reaction to sulfa drugs before. Currently asymptomatic, saturating well on RA.   - c/w benadryl 50mg Q6H, pepcid 40mg BID, prednisone 40 QD  - allergy consult in AM for sulfa desensitization protocol, will need MICU bed when one becomes available  - ENT consult and allergy c/s in AM

## 2019-02-14 NOTE — H&P ADULT - ASSESSMENT
43F with PMHx of CF who presents with an allergic reaction to antibiotics concerning for airway compromise, admitted for desensitization protocol.

## 2019-02-14 NOTE — PATIENT PROFILE ADULT - NSALCOHOLFREQ_GEN_A_NUR
REMOTE DEVICE CHECK:   [employer] Transmission, [employer] single chamber ICD, Battery RECALL 10/11/2016  The remote device transmission shows NORMAL FUNCTION.    Presenting Rhythm:  AF with RVR, 122-162 bpm  Battery Voltage/Longevity: 5.2 years  Percent Pacing:  <1%  Tachy Detections/Episodes:   3 VF episodes on 11/26/17, 2 with successful bursts of ATP and 1 with 33.8J shock after failed ATP.  Only 1 EGM available due to storage capacity which suggests AF with RVR, 261 bpm.  31 NSVT, 1 VT-2, and 60 SVT episodes all between 11/26/17 and 11/27/17 showed AF with RVR, 153-200 bpm, lasting seconds.  Since last OV 4/25/17 there has also been 230 NSVT episodes and 72 VT-1 episodes.  No EGMs available due to EGM storage capacity.  Patient is not on OAC  Intracardiac Monitoring:  The ICM was reviewed and shows possible fluid accumulation multiple days June thru October and 12 days in November, ongoing     See device parameters/results in the Media  Dr. López reviewed the device transmission results and agrees.  Follow Up:  No follow up appointment    Patient was called at 2 different phone numbers to make follow up appointment.  No answer.  Messages left to call clinic  
monthly or less

## 2019-02-14 NOTE — H&P ADULT - HISTORY OF PRESENT ILLNESS
Pt is a 43F with PMHx of CF who presents with an allergic reaction to antibiotics she was taking as outpatient. Pt required hospitalization in December 2018 for CF exacerbation and had been feeling well upon discharge since last week when she started experiencing increased work of breathing worse with ambulation, productive cough, and chills. She was started on bactrim and colistin via PICC line on 2/9. While Bactrim was infusing on 2/12, she abruptly started to experience a scratching sensation on the right side of her throat associated with wheezing and difficulty breathing. She did not experience tongue swelling or skin rash. She stopped the Bactrim infusion and took Benadryl. She continued to take colistin and was switched to Imipenem. Today, she experienced similar symptoms on the left side of her throat while Imipenem was infusing. She stopped the infusion, called her pulmonologist who advised her to come to the hospital for desensitization. Pt has hx of multiple allergies, however her previous reactions were exclusively the development of a rash and hives. She has never experienced anaphylaxis or airway edema. Currently, pt feels "not 100%" and c/o persistent cough, but denies wheezing, throat numbness/tingling, difficulty breathing/swallowing, shortness of breath, F/C/N/V. Pt checks her oxygen saturation at home and reports it has been in the high 90s all day.

## 2019-02-14 NOTE — PATIENT PROFILE ADULT - STATED REASON FOR ADMISSION
Allergic reaction to Bactrim. "Throat felt like it was closing". "Took benadryl 50mg PO, Pepcid 40mg and 60 mg Prednisone at home"

## 2019-02-14 NOTE — H&P ADULT - NSHPPHYSICALEXAM_GEN_ALL_CORE
.  VITAL SIGNS:  T(C): --  T(F): --  HR: --  BP: --  BP(mean): --  RR: --  SpO2: --  Wt(kg): --    PHYSICAL EXAM:    Constitutional: NAD  Head: NC/AT  Eyes: PERRLA, EOMI, clear conjunctiva  ENT: no nasal discharge; no oropharyngeal erythema, exudates, or edema  Neck: supple; no JVD or thyromegaly  Respiratory: coarse breath sounds in lower lung fields bilaterally  Cardiac: +S1/S2; RRR; no M/R/G; PMI non-displaced  Gastrointestinal: soft, NT/ND; no rebound or guarding; +BS, no hepatosplenomegaly  Extremities: WWP, no clubbing or cyanosis; no peripheral edema  Vascular: 2+ radial, DP/PT pulses B/L  Lymphatic: no submandibular or cervical LAD  Neurologic: AAOx3; CNII-XII grossly intact; no focal deficits

## 2019-02-14 NOTE — H&P ADULT - PROBLEM SELECTOR PLAN 3
Diet: Regular as per patient no pancreatic insufficiency and does not take pancreatic enzymes    DVT PPx: IMPROVE = 0, no chemical ppx

## 2019-02-15 DIAGNOSIS — T78.40XA ALLERGY, UNSPECIFIED, INITIAL ENCOUNTER: ICD-10-CM

## 2019-02-15 DIAGNOSIS — T78.40XS ALLERGY, UNSPECIFIED, SEQUELA: ICD-10-CM

## 2019-02-15 LAB
BASOPHILS # BLD AUTO: 0.02 K/UL — SIGNIFICANT CHANGE UP (ref 0–0.2)
BASOPHILS NFR BLD AUTO: 0.2 % — SIGNIFICANT CHANGE UP (ref 0–2)
EOSINOPHIL # BLD AUTO: 0.02 K/UL — SIGNIFICANT CHANGE UP (ref 0–0.5)
EOSINOPHIL NFR BLD AUTO: 0.2 % — SIGNIFICANT CHANGE UP (ref 0–6)
GRAM STN SPT: SIGNIFICANT CHANGE UP
HCT VFR BLD CALC: 36.9 % — SIGNIFICANT CHANGE UP (ref 34.5–45)
HGB BLD-MCNC: 11.6 G/DL — SIGNIFICANT CHANGE UP (ref 11.5–15.5)
IMM GRANULOCYTES NFR BLD AUTO: 0.5 % — SIGNIFICANT CHANGE UP (ref 0–1.5)
LYMPHOCYTES # BLD AUTO: 0.71 K/UL — LOW (ref 1–3.3)
LYMPHOCYTES # BLD AUTO: 6.3 % — LOW (ref 13–44)
MCHC RBC-ENTMCNC: 29.7 PG — SIGNIFICANT CHANGE UP (ref 27–34)
MCHC RBC-ENTMCNC: 31.4 % — LOW (ref 32–36)
MCV RBC AUTO: 94.6 FL — SIGNIFICANT CHANGE UP (ref 80–100)
MONOCYTES # BLD AUTO: 0.34 K/UL — SIGNIFICANT CHANGE UP (ref 0–0.9)
MONOCYTES NFR BLD AUTO: 3 % — SIGNIFICANT CHANGE UP (ref 2–14)
NEUTROPHILS # BLD AUTO: 10.05 K/UL — HIGH (ref 1.8–7.4)
NEUTROPHILS NFR BLD AUTO: 89.8 % — HIGH (ref 43–77)
NRBC # FLD: 0 K/UL — LOW (ref 25–125)
PLATELET # BLD AUTO: 387 K/UL — SIGNIFICANT CHANGE UP (ref 150–400)
PMV BLD: 9.3 FL — SIGNIFICANT CHANGE UP (ref 7–13)
RBC # BLD: 3.9 M/UL — SIGNIFICANT CHANGE UP (ref 3.8–5.2)
RBC # FLD: 14.5 % — SIGNIFICANT CHANGE UP (ref 10.3–14.5)
SPECIMEN SOURCE: SIGNIFICANT CHANGE UP
WBC # BLD: 11.2 K/UL — HIGH (ref 3.8–10.5)
WBC # FLD AUTO: 11.2 K/UL — HIGH (ref 3.8–10.5)

## 2019-02-15 PROCEDURE — 31575 DIAGNOSTIC LARYNGOSCOPY: CPT

## 2019-02-15 PROCEDURE — 99223 1ST HOSP IP/OBS HIGH 75: CPT | Mod: GC

## 2019-02-15 RX ORDER — ACETAMINOPHEN 500 MG
650 TABLET ORAL EVERY 6 HOURS
Qty: 0 | Refills: 0 | Status: DISCONTINUED | OUTPATIENT
Start: 2019-02-15 | End: 2019-02-22

## 2019-02-15 RX ADMIN — ALBUTEROL 2.5 MILLIGRAM(S): 90 AEROSOL, METERED ORAL at 03:54

## 2019-02-15 RX ADMIN — SODIUM CHLORIDE 4 MILLILITER(S): 9 INJECTION INTRAMUSCULAR; INTRAVENOUS; SUBCUTANEOUS at 21:26

## 2019-02-15 RX ADMIN — Medication 650 MILLIGRAM(S): at 22:50

## 2019-02-15 RX ADMIN — ALBUTEROL 2.5 MILLIGRAM(S): 90 AEROSOL, METERED ORAL at 11:11

## 2019-02-15 RX ADMIN — Medication 50 MILLIGRAM(S): at 02:46

## 2019-02-15 RX ADMIN — DORNASE ALFA 2.5 MILLIGRAM(S): 1 SOLUTION RESPIRATORY (INHALATION) at 21:25

## 2019-02-15 RX ADMIN — Medication 50 MILLIGRAM(S): at 08:26

## 2019-02-15 RX ADMIN — DORNASE ALFA 2.5 MILLIGRAM(S): 1 SOLUTION RESPIRATORY (INHALATION) at 11:08

## 2019-02-15 RX ADMIN — ALBUTEROL 2.5 MILLIGRAM(S): 90 AEROSOL, METERED ORAL at 16:13

## 2019-02-15 RX ADMIN — Medication 40 MILLIGRAM(S): at 05:36

## 2019-02-15 RX ADMIN — ALBUTEROL 2.5 MILLIGRAM(S): 90 AEROSOL, METERED ORAL at 21:25

## 2019-02-15 RX ADMIN — FAMOTIDINE 40 MILLIGRAM(S): 10 INJECTION INTRAVENOUS at 08:26

## 2019-02-15 RX ADMIN — Medication 650 MILLIGRAM(S): at 14:54

## 2019-02-15 RX ADMIN — SODIUM CHLORIDE 4 MILLILITER(S): 9 INJECTION INTRAMUSCULAR; INTRAVENOUS; SUBCUTANEOUS at 11:10

## 2019-02-15 RX ADMIN — Medication 650 MILLIGRAM(S): at 21:50

## 2019-02-15 RX ADMIN — Medication 50 MILLIGRAM(S): at 21:50

## 2019-02-15 RX ADMIN — Medication 50 MILLIGRAM(S): at 13:20

## 2019-02-15 RX ADMIN — FAMOTIDINE 40 MILLIGRAM(S): 10 INJECTION INTRAVENOUS at 21:49

## 2019-02-15 RX ADMIN — Medication 650 MILLIGRAM(S): at 15:50

## 2019-02-15 NOTE — CONSULT NOTE ADULT - SUBJECTIVE AND OBJECTIVE BOX
Patient is a 43y old  Female who presents with a chief complaint of Allergic Reaction (15 Feb 2019 10:19) AI consulted for possibel Bactrim allergy.    HPI:  Pt is a 43F with PMHx of CF who presents with possible allergic reaction to antibiotics she was taking as outpatient. Pt required hospitalization in 2018 for CF exacerbation and had been feeling well upon discharge since last week when she started experiencing increased work of breathing worse with ambulation, productive cough, and chills. She was started on bactrim and colistin via PICC line on . While Bactrim was infusing on , she abruptly started to experience a scratching sensation on the right side of her throat associated with wheezing and difficulty breathing. She did not experience tongue swelling or skin rash. She stopped the Bactrim infusion and took Benadryl. She continued to take colistin and was switched to Imipenem. Today, she experienced similar symptoms on the left side of her throat while Imipenem was infusing. She stopped the infusion, called her pulmonologist. Dr. Hollis contacted Dr. Kathleen who left a note in [facility] detailing how the scratching sensation is more likely a globus reaction and NOT a true allergy. To confirm this suspicion a GRADED DOSE CHALLENGE to bactrim would be needed. Given poor lung function this test should be performed in MICU per Dr. Hollis and Dr. Kathleen Should the sensation occur during the challenge ENT should evaluate for airway swelling.  Pt has hx of multiple allergies, however her previous reactions were exclusively the development of a rash and hives. She has never experienced anaphylaxis or airway edema. Currently, pt feels "not 100%" and c/o persistent cough, but denies wheezing, throat numbness/tingling, difficulty breathing/swallowing, shortness of breath, F/C/N/V. Pt checks her oxygen saturation at home and reports it has been in the high 90s all day. (2019 20:09)    AI consulted for formal consult by MICU to leave recs for graded dose challenge in [facility] chart as they are currently only in AE[facility].      Allergies    adhesives (Rash)  chlorhexidine containing compounds (Rash)  clindamycin (Hives)  fluoroquinolone antibiotics (Unknown)  levofloxacin (Hives)  macrolide antibiotics (Other)  penicillin (Hives)  Zithromax Z-Ivan (Hives)    Intolerances      MEDICATIONS  (STANDING):  ALBUTerol    0.083% 2.5 milliGRAM(s) Nebulizer every 6 hours  ALBUTerol    90 MICROgram(s) HFA Inhaler 1 Puff(s) Inhalation every 4 hours  buDESOnide 160 MICROgram(s)/formoterol 4.5 MICROgram(s) Inhaler 2 Puff(s) Inhalation two times a day  diphenhydrAMINE 50 milliGRAM(s) Oral every 6 hours  dornase diana Solution 2.5 milliGRAM(s) Inhalation two times a day  famotidine    Tablet 40 milliGRAM(s) Oral two times a day  predniSONE   Tablet 40 milliGRAM(s) Oral daily  sodium chloride 7% Inhalation 5 milliLiter(s) Inhalation two times a day    MEDICATIONS  (PRN):  acetaminophen    Suspension .. 650 milliGRAM(s) Oral every 6 hours PRN Mild Pain (1 - 3), Moderate Pain (4 - 6), Severe Pain (7 - 10)      PAST MEDICAL & SURGICAL HISTORY:  Deviated septum  CF (Cystic Fibrosis)  sinus surgery-    (Normal Spontaneous Vaginal Delivery)      REVIEW OF SYSTEMS  All review of systems negative, except for those marked:  General:	fatigue	  Eyes:	normal		  ENT:	scratching in throat		  Pulmonary:	CF patient with increased WOB	  Cardiac:		normal  Gastrointestinal:	 CF Patient  Renal/Urologic:		normal  Musculoskeletal:	 normal  Skin:		normal  Neurologic:	normal	  Psychiatric:	normal	  Endocrine:	normal	  Hematologic: normal		  Allergy/Immune:	 drug allergy        FAMILY HISTORY:  Family history of diabetes mellitus (Father)  Family history of cystic fibrosis (Sibling)      Vital Signs Last 24 Hrs  T(C): 36.9 (15 Feb 2019 15:57), Max: 36.9 (15 Feb 2019 15:57)  T(F): 98.4 (15 Feb 2019 15:57), Max: 98.4 (15 Feb 2019 15:57)  HR: 99 (15 Feb 2019 15:57) (77 - 99)  BP: 128/66 (15 Feb 2019 15:57) (114/59 - 128/66)  BP(mean): --  RR: 18 (15 Feb 2019 15:57) (18 - 18)  SpO2: 97% (15 Feb 2019 15:57) (95% - 98%)    PHYSICAL EXAM  All physical exam findings normal, except for those marked:  General:	              alert, well developed/well nourished, no acute distress  Eyes                      no conjunctival injection, no discharge, no photophobia, intact                                extraocular movements, sclera not icteric, no suborbital bogginess  ENT:                      external ear normal, normal nasal mucosa and turbinates without discharge, no                                 pharyngeal erythema or exudates, no cobblestoning, normal tongue and lips, normal tonsils   Neck                      supple, full range of motion  Lymph Nodes      normal size and consistency, non-tender  Cardiovascular     regular rate and rhythm; Normal S1, S2; No murmur  Respiratory	      diffuse crackles but no rales or rhonchi  Abdominal	       soft; ND, NT, no hepatosplenomegaly, + bowel sounds  		               normal external genitalia, no rash  Skin		               skin intact and not indurated; no rash, no desquamation  Neurologic	       alert, appropriate for age, cranial nerves II-XII grossly normal  Musculoskeletal   no joint swelling, erythema, or tenderness; full range of motion, with no contractures; no muscle tenderness; no clubbing; no cyanosis; no edema    Lab Results:                        11.6   11.20 )-----------( 387      ( 15 Feb 2019 09:30 )             36.9     -14    137  |  98  |  12  ----------------------------<  104<H>  4.2   |  23  |  0.70    Ca    9.3      2019 21:30  Phos  3.4     -  Mg     2.0     -    TPro  7.6  /  Alb  4.5  /  TBili  0.3  /  DBili  x   /  AST  18  /  ALT  17  /  AlkPhos  63  -14    LIVER FUNCTIONS - ( 2019 21:30 )  Alb: 4.5 g/dL / Pro: 7.6 g/dL / ALK PHOS: 63 u/L / ALT: 17 u/L / AST: 18 u/L / GGT: x

## 2019-02-15 NOTE — DIETITIAN INITIAL EVALUATION ADULT. - PERTINENT MEDS FT
MEDICATIONS  (STANDING):  ALBUTerol    0.083% 2.5 milliGRAM(s) Nebulizer every 6 hours  ALBUTerol    90 MICROgram(s) HFA Inhaler 1 Puff(s) Inhalation every 4 hours  buDESOnide 160 MICROgram(s)/formoterol 4.5 MICROgram(s) Inhaler 2 Puff(s) Inhalation two times a day  diphenhydrAMINE 50 milliGRAM(s) Oral every 6 hours  dornase diana Solution 2.5 milliGRAM(s) Inhalation two times a day  famotidine    Tablet 40 milliGRAM(s) Oral two times a day  predniSONE   Tablet 40 milliGRAM(s) Oral daily  sodium chloride 7% Inhalation 5 milliLiter(s) Inhalation two times a day    MEDICATIONS  (PRN):

## 2019-02-15 NOTE — PHYSICAL THERAPY INITIAL EVALUATION ADULT - PERTINENT HX OF CURRENT PROBLEM, REHAB EVAL
This is a 43F with PMHx of CF who presents with an allergic reaction to antibiotics concerning for airway compromise, admitted for desensitization protocol.

## 2019-02-15 NOTE — PROGRESS NOTE ADULT - ASSESSMENT
43F with PMHx of CF who presents with an allergic reaction to antibiotics concerning for airway compromise, admitted for desensitization protocol. 43F with PMHx of CF who presents with allergic reaction following antibiotic use  concerning for airway compromise, admitted for desensitization protocol.

## 2019-02-15 NOTE — DIETITIAN INITIAL EVALUATION ADULT. - PROBLEM SELECTOR PLAN 1
Globus sensation in throat with difficulty swallowing during antibiotic infusion in pt with multiple drug allergies concerning for allergic reaction to bactrim vs colistin vs imipenem. Pt does not have hx of reaction to sulfa drugs before. Currently asymptomatic, saturating well on RA.   - c/w benadryl 50mg Q6H, pepcid 40mg BID, prednisone 40 QD  - allergy consult in AM for sulfa desensitization protocol, will need MICU bed when one becomes available  - ENT consult and allergy c/s in AM

## 2019-02-15 NOTE — DIETITIAN INITIAL EVALUATION ADULT. - NS AS NUTRI INTERV COLLABORAT
1)Continue Regular diet with addition of [facility] food options.  Double portions.                     2)Obtain daily weights                        3)RDN remains available.  Tonie Sheppard RDN, CDN  pager [phone]

## 2019-02-15 NOTE — PROGRESS NOTE ADULT - SUBJECTIVE AND OBJECTIVE BOX
Patient is a 43y old  Female who presents with a chief complaint of Allergic Reaction (14 Feb 2019 20:09)      SUBJECTIVE / OVERNIGHT EVENTS:    REVIEW OF SYSTEMS:  CONSTITUTIONAL: No weakness, fevers or chills  EYES/ENT: No visual changes;  No vertigo or throat pain   NECK: No pain or stiffness  RESPIRATORY: No cough, wheezing, hemoptysis; No shortness of breath  CARDIOVASCULAR: No chest pain or palpitations  GASTROINTESTINAL: No abdominal pain, nausea, vomiting, or diarrhea. No melena or hematochezia.  GENITOURINARY: No dysuria, frequency or hematuria  NEUROLOGICAL: No numbness or weakness  SKIN: No itching, burning, rashes, or lesions   All other review of systems is negative unless indicated above.    MEDICATIONS  (STANDING):  ALBUTerol    0.083% 2.5 milliGRAM(s) Nebulizer every 6 hours  ALBUTerol    90 MICROgram(s) HFA Inhaler 1 Puff(s) Inhalation every 4 hours  buDESOnide 160 MICROgram(s)/formoterol 4.5 MICROgram(s) Inhaler 2 Puff(s) Inhalation two times a day  diphenhydrAMINE 50 milliGRAM(s) Oral every 6 hours  dornase diana Solution 2.5 milliGRAM(s) Inhalation two times a day  famotidine    Tablet 40 milliGRAM(s) Oral two times a day  predniSONE   Tablet 40 milliGRAM(s) Oral daily  sodium chloride 7% Inhalation 5 milliLiter(s) Inhalation two times a day    MEDICATIONS  (PRN):      T(C): 36.8 (02-15-19 @ 05:34), Max: 36.8 (02-14-19 @ 20:18)  HR: 87 (02-15-19 @ 03:55) (77 - 90)  BP: 117/62 (02-15-19 @ 05:34) (114/59 - 117/62)  RR: 18 (02-15-19 @ 05:34) (18 - 18)  SpO2: 97% (02-15-19 @ 05:34) (95% - 98%)    CAPILLARY BLOOD GLUCOSE        I&O's Summary      GENERAL: No acute distress, well-developed  HEAD:  Atraumatic, Normocephalic  ENT: EOMI, PERRLA, No JVD, moist mucosa  CHEST/LUNG: Clear to auscultation bilaterally  BACK: No spinal tenderness  HEART: Regular rate and rhythm; No murmurs, rubs, or gallops  ABDOMEN: Soft, Nontender, Nondistended; Bowel sounds present  EXTREMITIES:  No clubbing, cyanosis, or edema  PSYCH: Nl behavior, nl affect  NEUROLOGY: AAOx3, non-focal, cranial nerves intact  SKIN: Normal color, No rashes or lesions    LABS:                        11.3   11.30 )-----------( 387      ( 14 Feb 2019 21:30 )             35.2     02-14    137  |  98  |  12  ----------------------------<  104<H>  4.2   |  23  |  0.70    Ca    9.3      14 Feb 2019 21:30  Phos  3.4     02-14  Mg     2.0     02-14    TPro  7.6  /  Alb  4.5  /  TBili  0.3  /  DBili  x   /  AST  18  /  ALT  17  /  AlkPhos  63  02-14            RADIOLOGY & ADDITIONAL TESTS:  Imaging Personally Reviewed:  Consultant(s) Notes Reviewed:    Care Discussed with Consultants/Other Providers: Patient is a 43y old  Female who presents with a chief complaint of Allergic Reaction (14 Feb 2019 20:09)    SUBJECTIVE / OVERNIGHT EVENTS: no overnight events. Patient doing well this morning. Has productive cough but no SOB at rest, no cp, n/v , chest tightness, or swallowing difficulties. Seen at bedside by ENT PA , scope done with No Base of tongue edema, Epiglottis sharp,  No masses. No edema, Vocal cords mobile and patent.     REVIEW OF SYSTEMS:  CONSTITUTIONAL: No weakness, fevers or chills  EYES/ENT: No visual changes;  No vertigo or throat pain   NECK: No pain or stiffness  RESPIRATORY: No cough, wheezing, hemoptysis; No shortness of breath  CARDIOVASCULAR: No chest pain or palpitations  GASTROINTESTINAL: No abdominal pain, nausea, vomiting, or diarrhea. No melena or hematochezia.  GENITOURINARY: No dysuria, frequency or hematuria  NEUROLOGICAL: No numbness or weakness  SKIN: No itching, burning, rashes, or lesions   All other review of systems is negative unless indicated above.    MEDICATIONS  (STANDING):  ALBUTerol    0.083% 2.5 milliGRAM(s) Nebulizer every 6 hours  ALBUTerol    90 MICROgram(s) HFA Inhaler 1 Puff(s) Inhalation every 4 hours  buDESOnide 160 MICROgram(s)/formoterol 4.5 MICROgram(s) Inhaler 2 Puff(s) Inhalation two times a day  diphenhydrAMINE 50 milliGRAM(s) Oral every 6 hours  dornase diana Solution 2.5 milliGRAM(s) Inhalation two times a day  famotidine    Tablet 40 milliGRAM(s) Oral two times a day  predniSONE   Tablet 40 milliGRAM(s) Oral daily  sodium chloride 7% Inhalation 5 milliLiter(s) Inhalation two times a day    MEDICATIONS  (PRN):  T(C): 36.8 (02-15-19 @ 05:34), Max: 36.8 (02-14-19 @ 20:18)  HR: 87 (02-15-19 @ 03:55) (77 - 90)  BP: 117/62 (02-15-19 @ 05:34) (114/59 - 117/62)  RR: 18 (02-15-19 @ 05:34) (18 - 18)  SpO2: 97% (02-15-19 @ 05:34) (95% - 98%)    CAPILLARY BLOOD GLUCOSE    I&O's Summary    GENERAL: No acute distress, well-developed  HEAD:  Atraumatic, Normocephalic  ENT: EOMI, PERRLA, No JVD, moist mucosa  CHEST/LUNG: Diffuse fine crackles on posterior auscultation, good thoracic expansion. No presence of course rhonci.  HEART: Regular rate and rhythm; No murmurs, rubs, or gallops  ABDOMEN: Soft, Nontender, Nondistended; Bowel sounds present  EXTREMITIES:  No clubbing, cyanosis, or edema  PSYCH: Nl behavior, nl affect  NEUROLOGY: AAOx3, non-focal, cranial nerves intact  SKIN: Normal color, No rashes or lesions    LABS:                        11.3   11.30 )-----------( 387      ( 14 Feb 2019 21:30 )             35.2     02-14    137  |  98  |  12  ----------------------------<  104<H>  4.2   |  23  |  0.70    Ca    9.3      14 Feb 2019 21:30  Phos  3.4     02-14  Mg     2.0     02-14    TPro  7.6  /  Alb  4.5  /  TBili  0.3  /  DBili  x   /  AST  18  /  ALT  17  /  AlkPhos  63  02-14            RADIOLOGY & ADDITIONAL TESTS:  Imaging Personally Reviewed:  Consultant(s) Notes Reviewed:    Care Discussed with Consultants/Other Providers:

## 2019-02-15 NOTE — CONSULT NOTE ADULT - SUBJECTIVE AND OBJECTIVE BOX
43 year old female with CF that was on out patient IV bactrim for respiratory infection. Patient   states she started having the sensation that her throat was closing. Denies any SOB, dysphagia, dyspnea,   cough, fevers, chills, rigors, or any other complaints.     AVSS  HEENT:  No floor of mouth edema, No exudates, No injection.   tongue midline.     Scope:  No Base of tongue edema, Epiglottis sharp,    No masses. No edema, Vocal cords mobile and patent.

## 2019-02-15 NOTE — PROGRESS NOTE ADULT - PROBLEM SELECTOR PLAN 2
Pt afebrile without respiratory distress/increased work of breathing on presentation.   - will hold off on antibiotics at this time given recent allergic reaction  - albuterol nebulizer Q6H, hypersaline 7% BID, pulmozyme BID  - chest vest  - PT consult  - c/w home advair  - send sputum culture Pt afebrile without respiratory distress/increased work of breathing on presentation.   - albuterol nebulizer Q6H, hypersaline 7% BID, pulmozyme BID  - chest vest  - PT consult  - c/w home advair  - sputum culture, pending

## 2019-02-15 NOTE — CONSULT NOTE ADULT - ASSESSMENT
Ms. Flanagan is a 43 year old woman with CF and multiple abx allergies currently admitted for a graded dose challenge to Bactrim. Plan per Dr. Kathleen, her primary allergist; "Symptoms of globus sensation only are suggestive of VCD rather than allergic reaction but physician observed challenge is needed to observe that. There is no validated skin testing for bactrim available. Due to poor lung function, challenge needs to be done in the MICU.    recommend bactrim Challenge: 105 of total IV Bactrim dose, observe for 20 min. for any signs of allergic reactions. If no reactions; infuse 30% of the total dose, observe for 20 min. for any signs of allergic reactions. If no sign of reaction - infuse the rest. Monitor patient for 2 hours after completion of infusion.   -If patient complains of globus sensation and no other allergic symptoms - have ENT perform a laryngoscopy to r/o airway edema vs. VCD.   -Obtain tryptase  min after the onset of the reaction."    consent for challenge is always obtained by the primary team. All emergency medications for reaction including epinephrine, benadryl and albuterol should be readily available.

## 2019-02-15 NOTE — CONSULT NOTE ADULT - PROBLEM SELECTOR RECOMMENDATION 9
1. Airway is clear. Plan as per admitting service. Call ENT with additional questions.   Case discussed with resident.

## 2019-02-15 NOTE — PROGRESS NOTE ADULT - PROBLEM SELECTOR PLAN 3
Diet: Regular as per patient no pancreatic insufficiency and does not take pancreatic enzymes    DVT PPx: IMPROVE = 0, no chemical ppx Diet: Regular as per patient no pancreatic insufficiency and does not take pancreatic enzymes    DVT PPx: IMPROVE = 0, no chemical ppx  Dispo: pending PT

## 2019-02-15 NOTE — DIETITIAN INITIAL EVALUATION ADULT. - OTHER INFO
Pt. well known to this RDN.  Observed w >75% consumption of breakfast.  At this time, Pt. denies nausea/vomiting/diarrhea/constipation, or issues with chewing/swallowing.   Weight per prior hospitalization chart review 75.6kg on 12/17/19.   Pt. to be provided w double portions and [facility] food options.

## 2019-02-15 NOTE — PROGRESS NOTE ADULT - PROBLEM SELECTOR PLAN 1
Globus sensation in throat with difficulty swallowing during antibiotic infusion in pt with multiple drug allergies concerning for allergic reaction to bactrim vs colistin vs imipenem. Pt does not have hx of reaction to sulfa drugs before. Currently asymptomatic, saturating well on RA.   - c/w benadryl 50mg Q6H, pepcid 40mg BID, prednisone 40 QD  - allergy consult in AM for sulfa desensitization protocol, will need MICU bed when one becomes available  - ENT consult and allergy c/s in AM Globus sensation in throat with difficulty swallowing during antibiotic infusion in pt with multiple drug allergies concerning for allergic reaction to bactrim vs colistin vs imipenem.   -Pt does not have hx of reaction to sulfa drugs before.   - c/w benadryl 50mg Q6H, pepcid 40mg BID, prednisone 40 QD  - allergy consult in AM for sulfa desensitization protocol, will need MICU bed when one becomes available  - ENT consult and allergy c/s in AM Globus sensation in throat with difficulty swallowing during antibiotic infusion in pt with multiple drug allergies concerning for allergic reaction to bactrim vs colistin vs imipenem.   -Pt does not have hx of reaction to sulfa drugs before.   - c/w benadryl 50mg Q6H, pepcid 40mg BID, prednisone 40 QD  - Will get a graded dose challenge in the MICU per allergy and immunology , will receive a MICU bed when one becomes available  - ENT consult and allergy c/s in AM

## 2019-02-15 NOTE — CONSULT NOTE ADULT - PROBLEM SELECTOR RECOMMENDATION 9
Graded dose challenge as above  have all emergency medications available at the bedside.   If the patient passes the challenge she is NOT allergic to bactrim. if she fails please obtain tryptase as above and primary team can try to find an alternate antibiotic to use.

## 2019-02-15 NOTE — DIETITIAN INITIAL EVALUATION ADULT. - PERTINENT LABORATORY DATA
02-14 Na137 mmol/L Glu 104 mg/dL<H> K+ 4.2 mmol/L Cr  0.70 mg/dL BUN 12 mg/dL 02-14 Phos 3.4 mg/dL 02-14 Alb 4.5 g/dL

## 2019-02-16 ENCOUNTER — TRANSCRIPTION ENCOUNTER (OUTPATIENT)
Age: 44
End: 2019-02-16

## 2019-02-16 LAB
ANION GAP SERPL CALC-SCNC: 13 MMO/L — SIGNIFICANT CHANGE UP (ref 7–14)
BASOPHILS # BLD AUTO: 0.03 K/UL — SIGNIFICANT CHANGE UP (ref 0–0.2)
BASOPHILS NFR BLD AUTO: 0.3 % — SIGNIFICANT CHANGE UP (ref 0–2)
BUN SERPL-MCNC: 14 MG/DL — SIGNIFICANT CHANGE UP (ref 7–23)
CALCIUM SERPL-MCNC: 8.8 MG/DL — SIGNIFICANT CHANGE UP (ref 8.4–10.5)
CHLORIDE SERPL-SCNC: 102 MMOL/L — SIGNIFICANT CHANGE UP (ref 98–107)
CO2 SERPL-SCNC: 24 MMOL/L — SIGNIFICANT CHANGE UP (ref 22–31)
CREAT SERPL-MCNC: 0.69 MG/DL — SIGNIFICANT CHANGE UP (ref 0.5–1.3)
EOSINOPHIL # BLD AUTO: 0.18 K/UL — SIGNIFICANT CHANGE UP (ref 0–0.5)
EOSINOPHIL NFR BLD AUTO: 1.8 % — SIGNIFICANT CHANGE UP (ref 0–6)
GLUCOSE SERPL-MCNC: 80 MG/DL — SIGNIFICANT CHANGE UP (ref 70–99)
HCT VFR BLD CALC: 35.2 % — SIGNIFICANT CHANGE UP (ref 34.5–45)
HGB BLD-MCNC: 11.2 G/DL — LOW (ref 11.5–15.5)
IMM GRANULOCYTES NFR BLD AUTO: 0.7 % — SIGNIFICANT CHANGE UP (ref 0–1.5)
LYMPHOCYTES # BLD AUTO: 2.68 K/UL — SIGNIFICANT CHANGE UP (ref 1–3.3)
LYMPHOCYTES # BLD AUTO: 26.1 % — SIGNIFICANT CHANGE UP (ref 13–44)
MAGNESIUM SERPL-MCNC: 2 MG/DL — SIGNIFICANT CHANGE UP (ref 1.6–2.6)
MCHC RBC-ENTMCNC: 30.3 PG — SIGNIFICANT CHANGE UP (ref 27–34)
MCHC RBC-ENTMCNC: 31.8 % — LOW (ref 32–36)
MCV RBC AUTO: 95.1 FL — SIGNIFICANT CHANGE UP (ref 80–100)
MONOCYTES # BLD AUTO: 1.11 K/UL — HIGH (ref 0–0.9)
MONOCYTES NFR BLD AUTO: 10.8 % — SIGNIFICANT CHANGE UP (ref 2–14)
NEUTROPHILS # BLD AUTO: 6.19 K/UL — SIGNIFICANT CHANGE UP (ref 1.8–7.4)
NEUTROPHILS NFR BLD AUTO: 60.3 % — SIGNIFICANT CHANGE UP (ref 43–77)
NRBC # FLD: 0 K/UL — LOW (ref 25–125)
PHOSPHATE SERPL-MCNC: 3.6 MG/DL — SIGNIFICANT CHANGE UP (ref 2.5–4.5)
PLATELET # BLD AUTO: 338 K/UL — SIGNIFICANT CHANGE UP (ref 150–400)
PMV BLD: SIGNIFICANT CHANGE UP FL (ref 7–13)
POTASSIUM SERPL-MCNC: 3.8 MMOL/L — SIGNIFICANT CHANGE UP (ref 3.5–5.3)
POTASSIUM SERPL-SCNC: 3.8 MMOL/L — SIGNIFICANT CHANGE UP (ref 3.5–5.3)
RBC # BLD: 3.7 M/UL — LOW (ref 3.8–5.2)
RBC # FLD: SIGNIFICANT CHANGE UP % (ref 10.3–14.5)
REVIEW TO FOLLOW: YES — SIGNIFICANT CHANGE UP
SODIUM SERPL-SCNC: 139 MMOL/L — SIGNIFICANT CHANGE UP (ref 135–145)
WBC # BLD: 10.26 K/UL — SIGNIFICANT CHANGE UP (ref 3.8–10.5)
WBC # FLD AUTO: 10.26 K/UL — SIGNIFICANT CHANGE UP (ref 3.8–10.5)

## 2019-02-16 RX ORDER — BENZOCAINE AND MENTHOL 5; 1 G/100ML; G/100ML
1 LIQUID ORAL DAILY
Qty: 0 | Refills: 0 | Status: DISCONTINUED | OUTPATIENT
Start: 2019-02-16 | End: 2019-02-18

## 2019-02-16 RX ADMIN — Medication 650 MILLIGRAM(S): at 09:08

## 2019-02-16 RX ADMIN — DORNASE ALFA 2.5 MILLIGRAM(S): 1 SOLUTION RESPIRATORY (INHALATION) at 11:32

## 2019-02-16 RX ADMIN — ALBUTEROL 2.5 MILLIGRAM(S): 90 AEROSOL, METERED ORAL at 21:40

## 2019-02-16 RX ADMIN — ALBUTEROL 2.5 MILLIGRAM(S): 90 AEROSOL, METERED ORAL at 03:46

## 2019-02-16 RX ADMIN — Medication 650 MILLIGRAM(S): at 20:24

## 2019-02-16 RX ADMIN — ALBUTEROL 2.5 MILLIGRAM(S): 90 AEROSOL, METERED ORAL at 11:27

## 2019-02-16 RX ADMIN — Medication 40 MILLIGRAM(S): at 06:45

## 2019-02-16 RX ADMIN — Medication 650 MILLIGRAM(S): at 09:40

## 2019-02-16 RX ADMIN — SODIUM CHLORIDE 5 MILLILITER(S): 9 INJECTION INTRAMUSCULAR; INTRAVENOUS; SUBCUTANEOUS at 21:48

## 2019-02-16 RX ADMIN — SODIUM CHLORIDE 4 MILLILITER(S): 9 INJECTION INTRAMUSCULAR; INTRAVENOUS; SUBCUTANEOUS at 11:34

## 2019-02-16 RX ADMIN — Medication 650 MILLIGRAM(S): at 21:24

## 2019-02-16 RX ADMIN — Medication 50 MILLIGRAM(S): at 12:04

## 2019-02-16 RX ADMIN — Medication 50 MILLIGRAM(S): at 06:47

## 2019-02-16 RX ADMIN — FAMOTIDINE 40 MILLIGRAM(S): 10 INJECTION INTRAVENOUS at 09:08

## 2019-02-16 RX ADMIN — ALBUTEROL 2.5 MILLIGRAM(S): 90 AEROSOL, METERED ORAL at 17:41

## 2019-02-16 RX ADMIN — BENZOCAINE AND MENTHOL 1 LOZENGE: 5; 1 LIQUID ORAL at 09:08

## 2019-02-16 RX ADMIN — FAMOTIDINE 40 MILLIGRAM(S): 10 INJECTION INTRAVENOUS at 20:24

## 2019-02-16 RX ADMIN — Medication 50 MILLIGRAM(S): at 18:31

## 2019-02-16 RX ADMIN — DORNASE ALFA 2.5 MILLIGRAM(S): 1 SOLUTION RESPIRATORY (INHALATION) at 21:58

## 2019-02-16 NOTE — PROGRESS NOTE ADULT - PROBLEM SELECTOR PLAN 2
Pt afebrile without respiratory distress/increased work of breathing on presentation.   - albuterol nebulizer Q6H, hypersaline 7% BID, pulmozyme BID  - chest vest  - PT consult  - c/w home advair  - sputum culture,  with  gram + cocci and yeast

## 2019-02-16 NOTE — DISCHARGE NOTE ADULT - CARE PLAN
Principal Discharge DX:	Allergic reaction caused by a drug  Goal:	Please follow up with Dr. Kathleen and Dr. Hollis on discharge  Assessment and plan of treatment:	You came to the hospital because you has a global sensation in your throat while getting antibiotics through your PICC line. Your antibiotics were stopped and you took prednisone and benadryl at home . You touched base with your pulmonologist who had you sent to the hospital to have you get a graded challenge to Bactrim the antibiotic wanted to treat your infection. You had this done in the Medical Intensive care unit for close monitoring. you were scoped by ENT and there was no findings of airway edema at that time. In the Medical ICU, you were given the bactrim graded challenge, there  Secondary Diagnosis:	CF (Cystic Fibrosis)  Assessment and plan of treatment:	You were placed on contact precautions in the hospital. You were given your home medications. Please follow up with Dr. Hollis on discharge to further care for your pulmonary disease. Principal Discharge DX:	Allergic reaction caused by a drug  Goal:	Continue bactrim and colistin through 3/6/19. Please follow up with Dr. Kathleen and Dr. Hollis on discharge  Assessment and plan of treatment:	You came to the hospital because you has a global sensation in your throat while getting antibiotics through your PICC line. Your antibiotics were stopped and you took prednisone and benadryl at home . You touched base with your pulmonologist who had you sent to the hospital to have you get a graded challenge to Bactrim the antibiotic wanted to treat your infection. You had this done in the Medical Intensive care unit for close monitoring. you were scoped by ENT and there was no findings of airway edema at that time. In the Medical ICU, you were given the bactrim graded challenge, there you passed the challenge so you were continued on bactrim 300 mg twice daily and colistin 100 mg twice daily. You should follow up with Dr. Hollis within 1-2 weeks of discharge. If you feel like you are having a reaction to the medication you are being sent home with some prednisone. Please take 40 mg of prednisone if you have a reaction to the medication however a reaction is unlikely given the successful challenge in the ICU.  Secondary Diagnosis:	CF (Cystic Fibrosis)  Assessment and plan of treatment:	You were placed on contact precautions in the hospital. You were given your home medications. Please follow up with Dr. Hollis on discharge to further care for your pulmonary disease.

## 2019-02-16 NOTE — DISCHARGE NOTE ADULT - PATIENT PORTAL LINK FT
You can access the [facility], offered by [facility], by registering with the following website: [email or web address]

## 2019-02-16 NOTE — PROGRESS NOTE ADULT - PROBLEM SELECTOR PLAN 3
Diet: Regular as per patient no pancreatic insufficiency and does not take pancreatic enzymes, will provide double portions of meals for improved nutrition  DVT PPx: IMPROVE = 0, no chemical ppx  Dispo: home, skilled needs

## 2019-02-16 NOTE — DISCHARGE NOTE ADULT - MEDICATION SUMMARY - MEDICATIONS TO STOP TAKING
I will STOP taking the medications listed below when I get home from the hospital:    Kitabis Ivan 60 mg/mL inhalation solution  -- 5 milliliter(s) inhaled 2 times a day for 28 days on and 28 days off    Imipenem/Cilastatin 500mg Every 6 hours in 0.9% Sodium Chloride 100 ml IV intermittent infused over 60 minutes through PICC line  -- Indication: Cystic Fibrosis Exacerbation (ICD 10 E84)   End Date: 12/28/18    Biaxin  -- 1240 milligram(s) intravenous 2 times a day

## 2019-02-16 NOTE — DISCHARGE NOTE ADULT - ADDITIONAL INSTRUCTIONS
1. please make an appointment to follow up with Dr Hollis on discharge   2. please make appointment to follow up with Dr. ryan on discharge

## 2019-02-16 NOTE — DISCHARGE NOTE ADULT - CARE PROVIDER_API CALL
Manasa Kathleen)  Allergy and Immunology; Internal Medicine  [address]  [place] [address]  Phone: [phone]  Fax: [phone]  Follow Up Time:     Madisyn Hollis)  Critical Care Medicine; Internal Medicine; Pulmonary Disease; Sleep Medicine  [address]  [place] [address]  Phone: [phone]  Fax: [phone]  Follow Up Time:

## 2019-02-16 NOTE — DISCHARGE NOTE ADULT - MEDICATION SUMMARY - MEDICATIONS TO TAKE
I will START or STAY ON the medications listed below when I get home from the hospital:    CBC and CMP  -- 1   once on 2/25/19  ICD-10: E84.0  -- Indication: For CF (Cystic Fibrosis)    predniSONE 20 mg oral tablet  -- 2 tab(s) by mouth once a day, As Needed for reaction to medication  -- Indication: For Allergic reaction caused by a drug    Benadryl 50 mg oral capsule  -- 1 cap(s) by mouth 4 times a day  -- Indication: For Allergic reaction caused by a drug    fexofenadine 180 mg oral tablet  -- 1 tab(s) by mouth once a day, As Needed  -- Indication: For CF (Cystic Fibrosis)    Advair Diskus 500 mcg-50 mcg inhalation powder  -- 1 puff(s) inhaled 2 times a day  -- Indication: For CF (Cystic Fibrosis)    albuterol 2.5 mg/3 mL (0.083%) inhalation solution  -- 3 milliliter(s) inhaled every 4 -  6 hours for wheezing  -- Indication: For CF (Cystic Fibrosis)    ProAir HFA 90 mcg/inh inhalation aerosol  -- 2 puff(s) inhaled 4 times a day, As Needed  -- Indication: For CF (Cystic Fibrosis)    Pepcid 40 mg oral tablet  -- 1 tab(s) by mouth 2 times a day  -- Indication: For Need for prophylactic measure    Niferex 50 mg oral tablet  -- 50 milligram(s) by mouth once a day  -- Indication: For Need for prophylactic measure    colistimethate 150 mg (colistin base) injection  -- 100 milligram(s) injectable 2 times a day through 3/6/19  ICD-10: E84.0  -- Indication: For CF (Cystic Fibrosis)    Pulmozyme 2.5 mg/2.5 mL inhalation solution  -- 2.5 milliliter(s) inhaled once a day  -- Indication: For CF (Cystic Fibrosis)    Dymista 137 mcg-50 mcg/inh nasal spray  -- 1 spray(s) into nose 2 times a day  -- Indication: For CF (Cystic Fibrosis)    sulfamethoxazole-trimethoprim 80 mg-16 mg/mL intravenous solution  -- 300 milligram(s) intravenous 2 times a day through 3/6/19.  ICD-10: E84.0  -- Indication: For CF (Cystic Fibrosis)    Vitamin D3  -- 3000 unit(s) by mouth once a day  -- Indication: For Need for prophylactic measure I will START or STAY ON the medications listed below when I get home from the hospital:    CBC and CMP  -- 1   once on 2/25/19  ICD-10: E84.0  -- Indication: For CF (Cystic Fibrosis)    predniSONE 20 mg oral tablet  -- 2 tab(s) by mouth once a day, As Needed for reaction to medication  -- Indication: For Allergic reaction caused by a drug    Benadryl 50 mg oral capsule  -- 1 cap(s) by mouth 4 times a day  -- Indication: For Allergic reaction caused by a drug    fexofenadine 180 mg oral tablet  -- 1 tab(s) by mouth once a day, As Needed  -- Indication: For CF (Cystic Fibrosis)    Advair Diskus 500 mcg-50 mcg inhalation powder  -- 1 puff(s) inhaled 2 times a day  -- Indication: For CF (Cystic Fibrosis)    albuterol 2.5 mg/3 mL (0.083%) inhalation solution  -- 3 milliliter(s) inhaled every 4 -  6 hours for wheezing  -- Indication: For CF (Cystic Fibrosis)    ProAir HFA 90 mcg/inh inhalation aerosol  -- 2 puff(s) inhaled 4 times a day, As Needed  -- Indication: For CF (Cystic Fibrosis)    Pepcid 40 mg oral tablet  -- 1 tab(s) by mouth 2 times a day  -- Indication: For Need for prophylactic measure    Niferex 50 mg oral tablet  -- 50 milligram(s) by mouth once a day  -- Indication: For Need for prophylactic measure    colistimethate 150 mg (colistin base) injection  -- 100 milligram(s) injectable 2 times a day through 3/6/19  ICD-10: E84.0  -- Indication: For CF (Cystic Fibrosis)    Pulmozyme 2.5 mg/2.5 mL inhalation solution  -- 2.5 milliliter(s) inhaled once a day  -- Indication: For CF (Cystic Fibrosis)    Dymista 137 mcg-50 mcg/inh nasal spray  -- 1 spray(s) into nose 2 times a day  -- Indication: For CF (Cystic Fibrosis)    Protonix 40 mg oral delayed release tablet  -- 1 tab(s) by mouth once a day before breakfast  -- Indication: For GERD    sulfamethoxazole-trimethoprim 80 mg-16 mg/mL intravenous solution  -- 300 milligram(s) intravenous 2 times a day through 3/6/19.  ICD-10: E84.0  -- Indication: For CF (Cystic Fibrosis)    Vitamin D3  -- 3000 unit(s) by mouth once a day  -- Indication: For Need for prophylactic measure

## 2019-02-16 NOTE — DISCHARGE NOTE ADULT - HOME CARE AGENCY
[facility] [place] . RN to visit for 6pm dosing 2/22/2019. RN will call yout to confirm visit time.

## 2019-02-16 NOTE — DISCHARGE NOTE ADULT - PLAN OF CARE
Please follow up with Dr. Kathleen and Dr. Hollis on discharge You came to the hospital because you has a global sensation in your throat while getting antibiotics through your PICC line. Your antibiotics were stopped and you took prednisone and benadryl at home . You touched base with your pulmonologist who had you sent to the hospital to have you get a graded challenge to Bactrim the antibiotic wanted to treat your infection. You had this done in the Medical Intensive care unit for close monitoring. you were scoped by ENT and there was no findings of airway edema at that time. In the Medical ICU, you were given the bactrim graded challenge, there You were placed on contact precautions in the hospital. You were given your home medications. Please follow up with Dr. Hollis on discharge to further care for your pulmonary disease. Continue bactrim and colistin through 3/6/19. Please follow up with Dr. Kathleen and Dr. Hollis on discharge You came to the hospital because you has a global sensation in your throat while getting antibiotics through your PICC line. Your antibiotics were stopped and you took prednisone and benadryl at home . You touched base with your pulmonologist who had you sent to the hospital to have you get a graded challenge to Bactrim the antibiotic wanted to treat your infection. You had this done in the Medical Intensive care unit for close monitoring. you were scoped by ENT and there was no findings of airway edema at that time. In the Medical ICU, you were given the bactrim graded challenge, there you passed the challenge so you were continued on bactrim 300 mg twice daily and colistin 100 mg twice daily. You should follow up with Dr. Hollis within 1-2 weeks of discharge. If you feel like you are having a reaction to the medication you are being sent home with some prednisone. Please take 40 mg of prednisone if you have a reaction to the medication however a reaction is unlikely given the successful challenge in the ICU.

## 2019-02-16 NOTE — DISCHARGE NOTE ADULT - CARE PROVIDERS DIRECT ADDRESSES
[email or web address]

## 2019-02-16 NOTE — PROGRESS NOTE ADULT - PROBLEM SELECTOR PLAN 1
Globus sensation in throat with difficulty swallowing during antibiotic infusion in pt with multiple drug allergies concerning for allergic reaction to bactrim vs colistin vs imipenem.   -Pt does not have hx of reaction to sulfa drugs before.   - c/w benadryl 50mg Q6H, pepcid 40mg BID, prednisone 40 QD  - Will get a graded dose challenge in the MICU per allergy and immunology , will receive a MICU bed when one becomes available  - ENT bedside 2/15 with scope- No Base of tongue edema, Epiglottis sharp,    No masses. No edema, Vocal cords mobile and patent.   - Will Obtain tryptase  min after the onset of the reaction and call ENT for scope if pt complains of globus sensation to r/o airway edema vs. VCD

## 2019-02-16 NOTE — DISCHARGE NOTE ADULT - HOSPITAL COURSE
Pt is a 43F with PMHx of CF who presents with an allergic reaction to antibiotics she was taking as outpatient. Pt required hospitalization in December 2018 for CF exacerbation and had been feeling well upon discharge since last week when she started experiencing increased work of breathing worse with ambulation, productive cough, and chills. She was started on bactrim and colistin via PICC line on 2/9. While Bactrim was infusing on 2/12, she abruptly started to experience a scratching sensation on the right side of her throat associated with wheezing and difficulty breathing. She did not experience tongue swelling or skin rash. She stopped the Bactrim infusion and took Benadryl. She continued to take colistin and was switched to Imipenem.  She then experienced similar symptoms on the left side of her throat while Imipenem was infusing. She stopped the infusion, called her pulmonologist who advised her to come to the hospital for desensitization. Pt has hx of multiple allergies, however her previous reactions were exclusively the development of a rash and hives. She has never experienced anaphylaxis or airway edema.     Patient was care for on the floors and seen by allergy and immunology prior to being transferred to the ICU. Was scoped by ENT and found to have a patent airway. Was transferred to MICU for graded dose challenge to Bactrim. This is done there as the patient has poor lung reserves and if there happens to be airway compromise, personal would  be more readily available for assistance. Pt is a 43F with PMHx of CF who presents with an allergic reaction to antibiotics she was taking as outpatient. Pt required hospitalization in December 2018 for CF exacerbation and had been feeling well upon discharge since last week when she started experiencing increased work of breathing worse with ambulation, productive cough, and chills. She was started on bactrim and colistin via PICC line on 2/9. While Bactrim was infusing on 2/12, she abruptly started to experience a scratching sensation on the right side of her throat associated with wheezing and difficulty breathing. She did not experience tongue swelling or skin rash. She stopped the Bactrim infusion and took Benadryl. She continued to take colistin and was switched to Imipenem.  She then experienced similar symptoms on the left side of her throat while Imipenem was infusing. She stopped the infusion, called her pulmonologist who advised her to come to the hospital for desensitization. Pt has hx of multiple allergies, however her previous reactions were exclusively the development of a rash and hives. She has never experienced anaphylaxis or airway edema.     Patient was care for on the floors and seen by allergy and immunology prior to being transferred to the ICU. Was scoped by ENT and found to have a patent airway. Was transferred to MICU for graded dose challenge to Bactrim. This is done there as the patient has poor lung reserves and if there happens to be airway compromise, personal would  be more readily available for assistance. The patient passed the graded dose challenge and was sent home with a two week course of antibiotics, bactrim 300 mg BID and colistin 100 mg BID through 3/6/19

## 2019-02-16 NOTE — DISCHARGE NOTE ADULT - MEDICATION SUMMARY - MEDICATIONS TO CHANGE
I will SWITCH the dose or number of times a day I take the medications listed below when I get home from the hospital:    [id number] Twice a day in Dextrose 5% 50 ml IV intermittent infuse over 30 minutes via PICC line   -- Indication: Cystic Fibrosis Exacerbation (ICD 10: E84)  End Date: 12/28/18 I will SWITCH the dose or number of times a day I take the medications listed below when I get home from the hospital:    [id number] Twice a day in Dextrose 5% 50 ml IV intermittent infuse over 30 minutes via PICC line   -- Indication: Cystic Fibrosis Exacerbation (ICD 10: E84)  End Date: 12/28/18

## 2019-02-16 NOTE — PROGRESS NOTE ADULT - SUBJECTIVE AND OBJECTIVE BOX
Patient is a 43y old  Female who presents with a chief complaint of Allergic Reaction (15 Feb 2019 16:06)      SUBJECTIVE / OVERNIGHT EVENTS:    REVIEW OF SYSTEMS:  CONSTITUTIONAL: No weakness, fevers or chills  EYES/ENT: No visual changes;  No vertigo or throat pain   NECK: No pain or stiffness  RESPIRATORY: No cough, wheezing, hemoptysis; No shortness of breath  CARDIOVASCULAR: No chest pain or palpitations  GASTROINTESTINAL: No abdominal pain, nausea, vomiting, or diarrhea. No melena or hematochezia.  GENITOURINARY: No dysuria, frequency or hematuria  NEUROLOGICAL: No numbness or weakness  SKIN: No itching, burning, rashes, or lesions   All other review of systems is negative unless indicated above.    MEDICATIONS  (STANDING):  ALBUTerol    0.083% 2.5 milliGRAM(s) Nebulizer every 6 hours  ALBUTerol    90 MICROgram(s) HFA Inhaler 1 Puff(s) Inhalation every 4 hours  buDESOnide 160 MICROgram(s)/formoterol 4.5 MICROgram(s) Inhaler 2 Puff(s) Inhalation two times a day  diphenhydrAMINE 50 milliGRAM(s) Oral every 6 hours  dornase diana Solution 2.5 milliGRAM(s) Inhalation two times a day  famotidine    Tablet 40 milliGRAM(s) Oral two times a day  predniSONE   Tablet 40 milliGRAM(s) Oral daily  sodium chloride 7% Inhalation 5 milliLiter(s) Inhalation two times a day    MEDICATIONS  (PRN):  acetaminophen    Suspension .. 650 milliGRAM(s) Oral every 6 hours PRN Mild Pain (1 - 3), Moderate Pain (4 - 6), Severe Pain (7 - 10)      T(C): 36.8 (02-16-19 @ 05:21), Max: 36.9 (02-15-19 @ 15:57)  HR: 75 (02-16-19 @ 05:21) (72 - 99)  BP: 101/65 (02-16-19 @ 05:21) (101/65 - 128/66)  RR: 17 (02-16-19 @ 05:21) (17 - 18)  SpO2: 99% (02-16-19 @ 05:21) (97% - 99%)    CAPILLARY BLOOD GLUCOSE        I&O's Summary      GENERAL: No acute distress, well-developed  HEAD:  Atraumatic, Normocephalic  ENT: EOMI, PERRLA, No JVD, moist mucosa  CHEST/LUNG: Clear to auscultation bilaterally  BACK: No spinal tenderness  HEART: Regular rate and rhythm; No murmurs, rubs, or gallops  ABDOMEN: Soft, Nontender, Nondistended; Bowel sounds present  EXTREMITIES:  No clubbing, cyanosis, or edema  PSYCH: Nl behavior, nl affect  NEUROLOGY: AAOx3, non-focal, cranial nerves intact  SKIN: Normal color, No rashes or lesions    LABS:                        11.6   11.20 )-----------( 387      ( 15 Feb 2019 09:30 )             36.9     02-14    137  |  98  |  12  ----------------------------<  104<H>  4.2   |  23  |  0.70    Ca    9.3      14 Feb 2019 21:30  Phos  3.4     02-14  Mg     2.0     02-14    TPro  7.6  /  Alb  4.5  /  TBili  0.3  /  DBili  x   /  AST  18  /  ALT  17  /  AlkPhos  63  02-14    RADIOLOGY & ADDITIONAL TESTS:  Imaging Personally Reviewed:  Consultant(s) Notes Reviewed:    Care Discussed with Consultants/Other Providers: Patient is a 43y old  Female who presents with a chief complaint of Allergic Reaction (15 Feb 2019 16:06)      SUBJECTIVE / OVERNIGHT EVENTS: Patient feels well this AM. Still has productive cough with new sore throat. No abd pain or discomfort, no SOB.     REVIEW OF SYSTEMS:  CONSTITUTIONAL: No weakness, fevers or chills  EYES/ENT: No visual changes;  No vertigo or throat pain   NECK: No pain or stiffness  RESPIRATORY: No cough, wheezing, hemoptysis; No shortness of breath  CARDIOVASCULAR: No chest pain or palpitations  GASTROINTESTINAL: No abdominal pain, nausea, vomiting, or diarrhea. No melena or hematochezia.  GENITOURINARY: No dysuria, frequency or hematuria  NEUROLOGICAL: No numbness or weakness  SKIN: No itching, burning, rashes, or lesions   All other review of systems is negative unless indicated above.    MEDICATIONS  (STANDING):  ALBUTerol    0.083% 2.5 milliGRAM(s) Nebulizer every 6 hours  ALBUTerol    90 MICROgram(s) HFA Inhaler 1 Puff(s) Inhalation every 4 hours  buDESOnide 160 MICROgram(s)/formoterol 4.5 MICROgram(s) Inhaler 2 Puff(s) Inhalation two times a day  diphenhydrAMINE 50 milliGRAM(s) Oral every 6 hours  dornase diana Solution 2.5 milliGRAM(s) Inhalation two times a day  famotidine    Tablet 40 milliGRAM(s) Oral two times a day  predniSONE   Tablet 40 milliGRAM(s) Oral daily  sodium chloride 7% Inhalation 5 milliLiter(s) Inhalation two times a day    MEDICATIONS  (PRN):  acetaminophen    Suspension .. 650 milliGRAM(s) Oral every 6 hours PRN Mild Pain (1 - 3), Moderate Pain (4 - 6), Severe Pain (7 - 10)      T(C): 36.8 (02-16-19 @ 05:21), Max: 36.9 (02-15-19 @ 15:57)  HR: 75 (02-16-19 @ 05:21) (72 - 99)  BP: 101/65 (02-16-19 @ 05:21) (101/65 - 128/66)  RR: 17 (02-16-19 @ 05:21) (17 - 18)  SpO2: 99% (02-16-19 @ 05:21) (97% - 99%)    CAPILLARY BLOOD GLUCOSE    I&O's Summary  GENERAL: No acute distress, well-developed  HEAD:  Atraumatic, Normocephalic  ENT: EOMI, PERRLA, No JVD, moist mucosa  CHEST/LUNG: Crackles more diffuse   BACK: No spinal tenderness  HEART: Regular rate and rhythm; No murmurs, rubs, or gallops  ABDOMEN: Soft, Nontender, Nondistended; Bowel sounds present  EXTREMITIES:  No clubbing, cyanosis, or edema  PSYCH: Nl behavior, nl affect  NEUROLOGY: AAOx3, non-focal, cranial nerves intact  SKIN: Normal color, No rashes or lesions    LABS:                        11.6   11.20 )-----------( 387      ( 15 Feb 2019 09:30 )             36.9     02-14    137  |  98  |  12  ----------------------------<  104<H>  4.2   |  23  |  0.70    Ca    9.3      14 Feb 2019 21:30  Phos  3.4     02-14  Mg     2.0     02-14    TPro  7.6  /  Alb  4.5  /  TBili  0.3  /  DBili  x   /  AST  18  /  ALT  17  /  AlkPhos  63  02-14    RADIOLOGY & ADDITIONAL TESTS:  Imaging Personally Reviewed:  Consultant(s) Notes Reviewed:    Care Discussed with Consultants/Other Providers:

## 2019-02-16 NOTE — PROGRESS NOTE ADULT - ASSESSMENT
43F with PMHx of CF who presents with allergic reaction following antibiotic use  concerning for airway compromise, admitted for Graded dose challenge to bactrim

## 2019-02-17 RX ORDER — BENZOCAINE AND MENTHOL 5; 1 G/100ML; G/100ML
1 LIQUID ORAL ONCE
Qty: 0 | Refills: 0 | Status: COMPLETED | OUTPATIENT
Start: 2019-02-17 | End: 2019-02-17

## 2019-02-17 RX ADMIN — FAMOTIDINE 40 MILLIGRAM(S): 10 INJECTION INTRAVENOUS at 20:57

## 2019-02-17 RX ADMIN — Medication 40 MILLIGRAM(S): at 06:59

## 2019-02-17 RX ADMIN — DORNASE ALFA 2.5 MILLIGRAM(S): 1 SOLUTION RESPIRATORY (INHALATION) at 11:51

## 2019-02-17 RX ADMIN — ALBUTEROL 2.5 MILLIGRAM(S): 90 AEROSOL, METERED ORAL at 11:43

## 2019-02-17 RX ADMIN — Medication 50 MILLIGRAM(S): at 18:31

## 2019-02-17 RX ADMIN — BENZOCAINE AND MENTHOL 1 LOZENGE: 5; 1 LIQUID ORAL at 23:30

## 2019-02-17 RX ADMIN — ALBUTEROL 2.5 MILLIGRAM(S): 90 AEROSOL, METERED ORAL at 17:26

## 2019-02-17 RX ADMIN — Medication 50 MILLIGRAM(S): at 23:30

## 2019-02-17 RX ADMIN — FAMOTIDINE 40 MILLIGRAM(S): 10 INJECTION INTRAVENOUS at 08:37

## 2019-02-17 RX ADMIN — Medication 650 MILLIGRAM(S): at 23:29

## 2019-02-17 RX ADMIN — DORNASE ALFA 2.5 MILLIGRAM(S): 1 SOLUTION RESPIRATORY (INHALATION) at 22:05

## 2019-02-17 RX ADMIN — Medication 650 MILLIGRAM(S): at 12:28

## 2019-02-17 RX ADMIN — BENZOCAINE AND MENTHOL 1 LOZENGE: 5; 1 LIQUID ORAL at 17:35

## 2019-02-17 RX ADMIN — Medication 650 MILLIGRAM(S): at 13:20

## 2019-02-17 RX ADMIN — SODIUM CHLORIDE 5 MILLILITER(S): 9 INJECTION INTRAMUSCULAR; INTRAVENOUS; SUBCUTANEOUS at 11:46

## 2019-02-17 RX ADMIN — Medication 50 MILLIGRAM(S): at 00:28

## 2019-02-17 RX ADMIN — Medication 50 MILLIGRAM(S): at 12:40

## 2019-02-17 RX ADMIN — Medication 50 MILLIGRAM(S): at 06:59

## 2019-02-17 RX ADMIN — ALBUTEROL 2.5 MILLIGRAM(S): 90 AEROSOL, METERED ORAL at 21:55

## 2019-02-17 NOTE — PROGRESS NOTE ADULT - SUBJECTIVE AND OBJECTIVE BOX
[address] PGY3  Dept. Internal Medicine  Pager: #[phone]  Covering Pager: #[phone] After 7 pm on weekdays and 12 pm on weekends      Patient is a 43y old  Female who presents with a chief complaint of Allergic Reaction (16 Feb 2019 14:22)      SUBJECTIVE / OVERNIGHT EVENTS:    MEDICATIONS  (STANDING):  ALBUTerol    0.083% 2.5 milliGRAM(s) Nebulizer every 6 hours  ALBUTerol    90 MICROgram(s) HFA Inhaler 1 Puff(s) Inhalation every 4 hours  buDESOnide 160 MICROgram(s)/formoterol 4.5 MICROgram(s) Inhaler 2 Puff(s) Inhalation two times a day  diphenhydrAMINE 50 milliGRAM(s) Oral every 6 hours  dornase dinaa Solution 2.5 milliGRAM(s) Inhalation two times a day  famotidine    Tablet 40 milliGRAM(s) Oral two times a day  predniSONE   Tablet 40 milliGRAM(s) Oral daily  sodium chloride 7% Inhalation 5 milliLiter(s) Inhalation two times a day    MEDICATIONS  (PRN):  acetaminophen    Suspension .. 650 milliGRAM(s) Oral every 6 hours PRN Mild Pain (1 - 3), Moderate Pain (4 - 6), Severe Pain (7 - 10)  benzocaine 15 mG/menthol 3.6 mG (Sugar-Free) Lozenge 1 Lozenge Oral daily PRN Sore Throat      Vital Signs Last 24 Hrs  T(C): 36.8 (17 Feb 2019 06:56), Max: 36.8 (17 Feb 2019 06:56)  T(F): 98.3 (17 Feb 2019 06:56), Max: 98.3 (17 Feb 2019 06:56)  HR: 85 (17 Feb 2019 11:55) (74 - 98)  BP: 111/69 (17 Feb 2019 06:56) (111/69 - 129/80)  BP(mean): --  RR: 18 (17 Feb 2019 06:56) (18 - 18)  SpO2: 98% (17 Feb 2019 11:55) (96% - 100%)    PHYSICAL EXAM:  GENERAL: NAD, well-developed  HEAD:  Atraumatic, Normocephalic  EYES: EOMI, PERRLA, conjunctiva and sclera clear  NECK: Supple, No JVD  CHEST/LUNG: Clear to auscultation bilaterally; No wheeze  HEART: Regular rate and rhythm; No murmurs, rubs, or gallops  ABDOMEN: Soft, Nontender, Nondistended; Bowel sounds present  EXTREMITIES:  2+ Peripheral Pulses, No clubbing, cyanosis, or edema  PSYCH: AAOx3  NEUROLOGY: non-focal  SKIN: No rashes or lesions    LABS:                        11.2   10.26 )-----------( 338      ( 16 Feb 2019 06:15 )             35.2     02-16    139  |  102  |  14  ----------------------------<  80  3.8   |  24  |  0.69    Ca    8.8      16 Feb 2019 06:15  Phos  3.6     02-16  Mg     2.0     02-16      RADIOLOGY & ADDITIONAL TESTS:    Imaging Personally Reviewed:    Consultant(s) Notes Reviewed:      Care Discussed with Consultants/Other Providers: [facility] [address] PGY3  Dept. Internal Medicine  Pager: #[phone]  Covering Pager: #[phone] After 7 pm on weekdays and 12 pm on weekends      Patient is a 43y old  Female who presents with a chief complaint of Allergic Reaction (16 Feb 2019 14:22)    SUBJECTIVE / OVERNIGHT EVENTS:    No acute events. Patient c/o intermittent pain at right lung base w/ radiation to anterior left thoracic wall. Productive cough improved. No fever, chills, sweats overnight.     MEDICATIONS  (STANDING):  ALBUTerol    0.083% 2.5 milliGRAM(s) Nebulizer every 6 hours  ALBUTerol    90 MICROgram(s) HFA Inhaler 1 Puff(s) Inhalation every 4 hours  buDESOnide 160 MICROgram(s)/formoterol 4.5 MICROgram(s) Inhaler 2 Puff(s) Inhalation two times a day  diphenhydrAMINE 50 milliGRAM(s) Oral every 6 hours  dornase diana Solution 2.5 milliGRAM(s) Inhalation two times a day  famotidine    Tablet 40 milliGRAM(s) Oral two times a day  predniSONE   Tablet 40 milliGRAM(s) Oral daily  sodium chloride 7% Inhalation 5 milliLiter(s) Inhalation two times a day    MEDICATIONS  (PRN):  acetaminophen    Suspension .. 650 milliGRAM(s) Oral every 6 hours PRN Mild Pain (1 - 3), Moderate Pain (4 - 6), Severe Pain (7 - 10)  benzocaine 15 mG/menthol 3.6 mG (Sugar-Free) Lozenge 1 Lozenge Oral daily PRN Sore Throat      Vital Signs Last 24 Hrs  T(C): 36.8 (17 Feb 2019 06:56), Max: 36.8 (17 Feb 2019 06:56)  T(F): 98.3 (17 Feb 2019 06:56), Max: 98.3 (17 Feb 2019 06:56)  HR: 85 (17 Feb 2019 11:55) (74 - 98)  BP: 111/69 (17 Feb 2019 06:56) (111/69 - 129/80)  BP(mean): --  RR: 18 (17 Feb 2019 06:56) (18 - 18)  SpO2: 98% (17 Feb 2019 11:55) (96% - 100%)    PHYSICAL EXAM:  GENERAL: NAD, well-developed, well-appearing   HEAD:  Atraumatic, Normocephalic  EYES: EOMI, PERRLA, conjunctiva and sclera clear  NECK: Supple, No JVD  CHEST/LUNG: Clear to auscultation bilaterally; no crackles or rhonchi   HEART: Regular rate and rhythm; No murmurs, rubs, or gallops  ABDOMEN: Soft, Nontender, Nondistended; Bowel sounds present  EXTREMITIES:  2+ Peripheral Pulses, No clubbing, cyanosis, or edema  PSYCH: AAOx3  NEUROLOGY: non-focal  SKIN: No rashes or lesions    LABS:                        11.2   10.26 )-----------( 338      ( 16 Feb 2019 06:15 )             35.2     02-16    139  |  102  |  14  ----------------------------<  80  3.8   |  24  |  0.69    Ca    8.8      16 Feb 2019 06:15  Phos  3.6     02-16  Mg     2.0     02-16      RADIOLOGY & ADDITIONAL TESTS:    Imaging Personally Reviewed:    Consultant(s) Notes Reviewed:      Care Discussed with Consultants/Other Providers: Pulmonary/MICU

## 2019-02-17 NOTE — PROGRESS NOTE ADULT - PROBLEM SELECTOR PLAN 2
Pt afebrile without respiratory distress/increased work of breathing on presentation.   - albuterol nebulizer Q6H, hypersaline 7% BID, pulmozyme BID  - chest vest  - PT consult  - c/w home advair  - sputum culture,  with  gram + cocci and yeast - afebrile without respiratory distress/increased work of breathing on presentation - mucus secretions improved this admission   - albuterol nebulizer q6H, hypersaline 7% BID, Pulmozyme BID, chest vest per Pulmonary recommendations  - c/w home Advair  - sputum culture shows normal respiratory jacquie - afebrile without respiratory distress/increased work of breathing on presentation - mucus secretions improved this admission with aggressive chest PT  - albuterol nebulizer q6H, hypersaline 7% BID, Pulmozyme BID, chest vest per Pulm  recs  - sputum culture shows normal respiratory jacquie

## 2019-02-17 NOTE — PROGRESS NOTE ADULT - ASSESSMENT
43F with PMHx of CF who presents with allergic reaction following antibiotic use  concerning for airway compromise, admitted for Graded dose challenge to bactrim 43F with Cystic Fibrosis who presents with symptoms concerning for allergic reaction to Bactrim, admitted for graded dose challenge to Bactrim pending MICU transfer.

## 2019-02-18 PROCEDURE — 99255 IP/OBS CONSLTJ NEW/EST HI 80: CPT

## 2019-02-18 PROCEDURE — 99231 SBSQ HOSP IP/OBS SF/LOW 25: CPT | Mod: GC

## 2019-02-18 RX ORDER — BENZOCAINE AND MENTHOL 5; 1 G/100ML; G/100ML
1 LIQUID ORAL
Qty: 0 | Refills: 0 | Status: DISCONTINUED | OUTPATIENT
Start: 2019-02-18 | End: 2019-02-22

## 2019-02-18 RX ADMIN — ALBUTEROL 2.5 MILLIGRAM(S): 90 AEROSOL, METERED ORAL at 17:20

## 2019-02-18 RX ADMIN — Medication 50 MILLIGRAM(S): at 23:34

## 2019-02-18 RX ADMIN — BENZOCAINE AND MENTHOL 1 LOZENGE: 5; 1 LIQUID ORAL at 06:16

## 2019-02-18 RX ADMIN — Medication 40 MILLIGRAM(S): at 06:16

## 2019-02-18 RX ADMIN — BENZOCAINE AND MENTHOL 1 LOZENGE: 5; 1 LIQUID ORAL at 23:34

## 2019-02-18 RX ADMIN — SODIUM CHLORIDE 5 MILLILITER(S): 9 INJECTION INTRAMUSCULAR; INTRAVENOUS; SUBCUTANEOUS at 22:12

## 2019-02-18 RX ADMIN — FAMOTIDINE 40 MILLIGRAM(S): 10 INJECTION INTRAVENOUS at 21:19

## 2019-02-18 RX ADMIN — ALBUTEROL 2.5 MILLIGRAM(S): 90 AEROSOL, METERED ORAL at 11:50

## 2019-02-18 RX ADMIN — DORNASE ALFA 2.5 MILLIGRAM(S): 1 SOLUTION RESPIRATORY (INHALATION) at 17:24

## 2019-02-18 RX ADMIN — BENZOCAINE AND MENTHOL 1 LOZENGE: 5; 1 LIQUID ORAL at 18:19

## 2019-02-18 RX ADMIN — Medication 650 MILLIGRAM(S): at 21:52

## 2019-02-18 RX ADMIN — BENZOCAINE AND MENTHOL 1 LOZENGE: 5; 1 LIQUID ORAL at 16:14

## 2019-02-18 RX ADMIN — SODIUM CHLORIDE 5 MILLILITER(S): 9 INJECTION INTRAMUSCULAR; INTRAVENOUS; SUBCUTANEOUS at 11:59

## 2019-02-18 RX ADMIN — Medication 650 MILLIGRAM(S): at 21:19

## 2019-02-18 RX ADMIN — Medication 50 MILLIGRAM(S): at 18:19

## 2019-02-18 RX ADMIN — Medication 50 MILLIGRAM(S): at 06:16

## 2019-02-18 RX ADMIN — Medication 650 MILLIGRAM(S): at 00:30

## 2019-02-18 RX ADMIN — Medication 50 MILLIGRAM(S): at 12:53

## 2019-02-18 RX ADMIN — BENZOCAINE AND MENTHOL 1 LOZENGE: 5; 1 LIQUID ORAL at 12:53

## 2019-02-18 RX ADMIN — FAMOTIDINE 40 MILLIGRAM(S): 10 INJECTION INTRAVENOUS at 07:47

## 2019-02-18 RX ADMIN — ALBUTEROL 2.5 MILLIGRAM(S): 90 AEROSOL, METERED ORAL at 22:03

## 2019-02-18 RX ADMIN — DORNASE ALFA 2.5 MILLIGRAM(S): 1 SOLUTION RESPIRATORY (INHALATION) at 22:23

## 2019-02-18 NOTE — PROGRESS NOTE ADULT - PROBLEM SELECTOR PLAN 1
Recent globus sensation in throat with difficulty swallowing during antibiotic infusion at home concerning for allergic reaction to Bactrim vs colistin vs imipenem - no active symptoms at this time    - c/w Benadryl 50mg q6H, Pepcid 40mg BID, prednisone 40 qD  - s/p scope by ENT 02/15 - no base of tongue edema, no edema, vocal cords mobile and patent  - graded dose challenge with protocol documented per Allergy and Immunology on 02/15 pending availability of MICU bed

## 2019-02-18 NOTE — PROGRESS NOTE ADULT - SUBJECTIVE AND OBJECTIVE BOX
Patient is a 43y old  Female who presents with a chief complaint of Allergic Reaction (17 Feb 2019 12:03)      SUBJECTIVE / OVERNIGHT EVENTS:  Patient seen and examined at bedside. No acute events overnight. The patient is still complaining of sore throat and right lower chest/flank pain similar to the pain before she started taking the antibiotics. She has had no diarrhea, no constipation, no nausea, no vomiting, no shortness of breath.     REVIEW OF SYSTEMS:    CONSTITUTIONAL: No weakness, fevers or chills  EYES/ENT: No visual changes;  No vertigo or throat pain   NECK: No pain or stiffness, sore throat  RESPIRATORY: productive cough, no wheezing, hemoptysis; No shortness of breath  CARDIOVASCULAR: No chest pain or palpitations  GASTROINTESTINAL: No abdominal or epigastric pain. No nausea, vomiting, or hematemesis; No diarrhea or constipation. No melena or hematochezia.  GENITOURINARY: No dysuria, frequency or hematuria  NEUROLOGICAL: No numbness or weakness  SKIN: No itching, rashes      MEDICATIONS  (STANDING):  ALBUTerol    0.083% 2.5 milliGRAM(s) Nebulizer every 6 hours  ALBUTerol    90 MICROgram(s) HFA Inhaler 1 Puff(s) Inhalation every 4 hours  buDESOnide 160 MICROgram(s)/formoterol 4.5 MICROgram(s) Inhaler 2 Puff(s) Inhalation two times a day  diphenhydrAMINE 50 milliGRAM(s) Oral every 6 hours  dornase diana Solution 2.5 milliGRAM(s) Inhalation two times a day  famotidine    Tablet 40 milliGRAM(s) Oral two times a day  predniSONE   Tablet 40 milliGRAM(s) Oral daily  sodium chloride 7% Inhalation 5 milliLiter(s) Inhalation two times a day    MEDICATIONS  (PRN):  acetaminophen    Suspension .. 650 milliGRAM(s) Oral every 6 hours PRN Mild Pain (1 - 3), Moderate Pain (4 - 6), Severe Pain (7 - 10)  benzocaine 15 mG/menthol 3.6 mG (Sugar-Free) Lozenge 1 Lozenge Oral every 2 hours PRN Sore Throat      OBJECTIVE:    Vital Signs Last 24 Hrs  T(C): 36.7 (18 Feb 2019 12:40), Max: 36.9 (18 Feb 2019 05:51)  T(F): 98.1 (18 Feb 2019 12:40), Max: 98.4 (18 Feb 2019 05:51)  HR: 96 (18 Feb 2019 12:40) (74 - 96)  BP: 120/83 (18 Feb 2019 12:40) (120/83 - 128/76)  BP(mean): --  RR: 17 (18 Feb 2019 12:40) (17 - 18)  SpO2: 97% (18 Feb 2019 12:40) (95% - 98%)      PHYSICAL EXAM:  GENERAL: NAD, well-developed  HEAD:  Atraumatic, Normocephalic  CHEST/LUNG: Clear to auscultation bilaterally; No wheeze  HEART: Regular rate and rhythm; No murmurs, rubs, or gallops  ABDOMEN: Soft, Nontender, Nondistended; Bowel sounds present  EXTREMITIES:  2+ Peripheral Pulses, No clubbing, cyanosis, or edema  PSYCH: AAOx3  NEUROLOGY: non-focal  SKIN: No rashes or lesions    Care Discussed with Consultants/Other Providers:  Care discussed with pulmonology

## 2019-02-18 NOTE — PROGRESS NOTE ADULT - ASSESSMENT
43F with Cystic Fibrosis who presents with symptoms concerning for allergic reaction to Bactrim, admitted for graded dose challenge to Bactrim pending MICU transfer.

## 2019-02-18 NOTE — PROGRESS NOTE ADULT - PROBLEM SELECTOR PLAN 2
- afebrile without respiratory distress/increased work of breathing on presentation - mucus secretions improved this admission with aggressive chest PT  - albuterol nebulizer q6H, hypersaline 7% BID, Pulmozyme BID, chest vest per Pulm  recs  - sputum culture shows normal respiratory jacquie

## 2019-02-19 ENCOUNTER — OTHER (OUTPATIENT)
Age: 44
End: 2019-02-19

## 2019-02-19 PROCEDURE — 99232 SBSQ HOSP IP/OBS MODERATE 35: CPT | Mod: GC

## 2019-02-19 RX ORDER — LIDOCAINE 4 G/100G
1 CREAM TOPICAL EVERY 24 HOURS
Qty: 0 | Refills: 0 | Status: DISCONTINUED | OUTPATIENT
Start: 2019-02-19 | End: 2019-02-22

## 2019-02-19 RX ORDER — ACETAMINOPHEN 500 MG
325 TABLET ORAL ONCE
Qty: 0 | Refills: 0 | Status: COMPLETED | OUTPATIENT
Start: 2019-02-19 | End: 2019-02-19

## 2019-02-19 RX ADMIN — SODIUM CHLORIDE 4 MILLILITER(S): 9 INJECTION INTRAMUSCULAR; INTRAVENOUS; SUBCUTANEOUS at 21:13

## 2019-02-19 RX ADMIN — Medication 325 MILLIGRAM(S): at 11:05

## 2019-02-19 RX ADMIN — Medication 650 MILLIGRAM(S): at 08:43

## 2019-02-19 RX ADMIN — Medication 650 MILLIGRAM(S): at 23:40

## 2019-02-19 RX ADMIN — Medication 325 MILLIGRAM(S): at 12:05

## 2019-02-19 RX ADMIN — DORNASE ALFA 2.5 MILLIGRAM(S): 1 SOLUTION RESPIRATORY (INHALATION) at 09:36

## 2019-02-19 RX ADMIN — ALBUTEROL 2.5 MILLIGRAM(S): 90 AEROSOL, METERED ORAL at 09:35

## 2019-02-19 RX ADMIN — ALBUTEROL 2.5 MILLIGRAM(S): 90 AEROSOL, METERED ORAL at 16:28

## 2019-02-19 RX ADMIN — Medication 50 MILLIGRAM(S): at 11:52

## 2019-02-19 RX ADMIN — Medication 40 MILLIGRAM(S): at 06:35

## 2019-02-19 RX ADMIN — Medication 650 MILLIGRAM(S): at 22:41

## 2019-02-19 RX ADMIN — Medication 50 MILLIGRAM(S): at 22:40

## 2019-02-19 RX ADMIN — DORNASE ALFA 2.5 MILLIGRAM(S): 1 SOLUTION RESPIRATORY (INHALATION) at 21:23

## 2019-02-19 RX ADMIN — Medication 650 MILLIGRAM(S): at 09:22

## 2019-02-19 RX ADMIN — LIDOCAINE 1 PATCH: 4 CREAM TOPICAL at 17:01

## 2019-02-19 RX ADMIN — BENZOCAINE AND MENTHOL 1 LOZENGE: 5; 1 LIQUID ORAL at 11:05

## 2019-02-19 RX ADMIN — FAMOTIDINE 40 MILLIGRAM(S): 10 INJECTION INTRAVENOUS at 22:40

## 2019-02-19 RX ADMIN — Medication 50 MILLIGRAM(S): at 17:02

## 2019-02-19 RX ADMIN — BENZOCAINE AND MENTHOL 1 LOZENGE: 5; 1 LIQUID ORAL at 08:43

## 2019-02-19 RX ADMIN — ALBUTEROL 2.5 MILLIGRAM(S): 90 AEROSOL, METERED ORAL at 21:06

## 2019-02-19 RX ADMIN — SODIUM CHLORIDE 5 MILLILITER(S): 9 INJECTION INTRAMUSCULAR; INTRAVENOUS; SUBCUTANEOUS at 09:35

## 2019-02-19 RX ADMIN — FAMOTIDINE 40 MILLIGRAM(S): 10 INJECTION INTRAVENOUS at 08:43

## 2019-02-19 RX ADMIN — Medication 50 MILLIGRAM(S): at 06:35

## 2019-02-19 RX ADMIN — LIDOCAINE 1 PATCH: 4 CREAM TOPICAL at 19:14

## 2019-02-19 NOTE — PROGRESS NOTE ADULT - SUBJECTIVE AND OBJECTIVE BOX
Patient is a 43y old  Female who presents with a chief complaint of Allergic Reaction (18 Feb 2019 14:00)      SUBJECTIVE / OVERNIGHT EVENTS:  Patient seen and examined at bedside.    Other Review of Systems Negative.    MEDICATIONS  (STANDING):  ALBUTerol    0.083% 2.5 milliGRAM(s) Nebulizer every 6 hours  ALBUTerol    90 MICROgram(s) HFA Inhaler 1 Puff(s) Inhalation every 4 hours  buDESOnide 160 MICROgram(s)/formoterol 4.5 MICROgram(s) Inhaler 2 Puff(s) Inhalation two times a day  diphenhydrAMINE 50 milliGRAM(s) Oral every 6 hours  dornase diana Solution 2.5 milliGRAM(s) Inhalation two times a day  famotidine    Tablet 40 milliGRAM(s) Oral two times a day  predniSONE   Tablet 40 milliGRAM(s) Oral daily  sodium chloride 7% Inhalation 5 milliLiter(s) Inhalation two times a day    MEDICATIONS  (PRN):  acetaminophen    Suspension .. 650 milliGRAM(s) Oral every 6 hours PRN Mild Pain (1 - 3), Moderate Pain (4 - 6), Severe Pain (7 - 10)  benzocaine 15 mG/menthol 3.6 mG (Sugar-Free) Lozenge 1 Lozenge Oral every 2 hours PRN Sore Throat      OBJECTIVE:    Vital Signs Last 24 Hrs  T(C): 36.8 (19 Feb 2019 13:00), Max: 36.8 (19 Feb 2019 13:00)  T(F): 98.3 (19 Feb 2019 13:00), Max: 98.3 (19 Feb 2019 13:00)  HR: 85 (19 Feb 2019 13:00) (71 - 98)  BP: 113/80 (19 Feb 2019 13:00) (108/63 - 120/74)  BP(mean): --  RR: 19 (19 Feb 2019 13:00) (18 - 19)  SpO2: 97% (19 Feb 2019 13:00) (96% - 100%)    PHYSICAL EXAM:  GENERAL: NAD, well-developed  HEAD:  Atraumatic, Normocephalic  CHEST/LUNG: Clear to auscultation bilaterally; No wheeze  HEART: Regular rate and rhythm; No murmurs, rubs, or gallops  ABDOMEN: Soft, Nontender, Nondistended; Bowel sounds present  EXTREMITIES:  2+ Peripheral Pulses, No clubbing, cyanosis, or edema  PSYCH: AAOx3  NEUROLOGY: non-focal  SKIN: No rashes or lesions    Care Discussed with Consultants/Other Providers:  Pulmonology

## 2019-02-20 RX ORDER — SODIUM CHLORIDE 9 MG/ML
4 INJECTION INTRAMUSCULAR; INTRAVENOUS; SUBCUTANEOUS
Qty: 0 | Refills: 0 | Status: DISCONTINUED | OUTPATIENT
Start: 2019-02-20 | End: 2019-02-22

## 2019-02-20 RX ORDER — EPINEPHRINE 0.3 MG/.3ML
0.3 INJECTION INTRAMUSCULAR; SUBCUTANEOUS ONCE
Qty: 0 | Refills: 0 | Status: DISCONTINUED | OUTPATIENT
Start: 2019-02-20 | End: 2019-02-22

## 2019-02-20 RX ADMIN — ALBUTEROL 2.5 MILLIGRAM(S): 90 AEROSOL, METERED ORAL at 09:30

## 2019-02-20 RX ADMIN — Medication 50 MILLIGRAM(S): at 05:17

## 2019-02-20 RX ADMIN — Medication 650 MILLIGRAM(S): at 05:19

## 2019-02-20 RX ADMIN — Medication 512.5 MILLIGRAM(S): at 18:27

## 2019-02-20 RX ADMIN — LIDOCAINE 1 PATCH: 4 CREAM TOPICAL at 18:15

## 2019-02-20 RX ADMIN — SODIUM CHLORIDE 4 MILLILITER(S): 9 INJECTION INTRAMUSCULAR; INTRAVENOUS; SUBCUTANEOUS at 21:52

## 2019-02-20 RX ADMIN — BENZOCAINE AND MENTHOL 1 LOZENGE: 5; 1 LIQUID ORAL at 08:50

## 2019-02-20 RX ADMIN — Medication 50 MILLIGRAM(S): at 12:11

## 2019-02-20 RX ADMIN — Medication 650 MILLIGRAM(S): at 06:15

## 2019-02-20 RX ADMIN — Medication 50 MILLIGRAM(S): at 18:17

## 2019-02-20 RX ADMIN — SODIUM CHLORIDE 4 MILLILITER(S): 9 INJECTION INTRAMUSCULAR; INTRAVENOUS; SUBCUTANEOUS at 09:40

## 2019-02-20 RX ADMIN — LIDOCAINE 1 PATCH: 4 CREAM TOPICAL at 05:20

## 2019-02-20 RX ADMIN — Medication 40 MILLIGRAM(S): at 05:17

## 2019-02-20 RX ADMIN — Medication 256.25 MILLIGRAM(S): at 17:05

## 2019-02-20 RX ADMIN — LIDOCAINE 1 PATCH: 4 CREAM TOPICAL at 19:08

## 2019-02-20 RX ADMIN — Medication 51.88 MILLIGRAM(S): at 15:44

## 2019-02-20 RX ADMIN — BENZOCAINE AND MENTHOL 1 LOZENGE: 5; 1 LIQUID ORAL at 12:11

## 2019-02-20 RX ADMIN — FAMOTIDINE 40 MILLIGRAM(S): 10 INJECTION INTRAVENOUS at 19:27

## 2019-02-20 RX ADMIN — DORNASE ALFA 2.5 MILLIGRAM(S): 1 SOLUTION RESPIRATORY (INHALATION) at 09:50

## 2019-02-20 RX ADMIN — ALBUTEROL 2.5 MILLIGRAM(S): 90 AEROSOL, METERED ORAL at 21:43

## 2019-02-20 RX ADMIN — DORNASE ALFA 2.5 MILLIGRAM(S): 1 SOLUTION RESPIRATORY (INHALATION) at 22:11

## 2019-02-20 RX ADMIN — FAMOTIDINE 40 MILLIGRAM(S): 10 INJECTION INTRAVENOUS at 08:50

## 2019-02-20 NOTE — CHART NOTE - NSCHARTNOTEFT_GEN_A_CORE
MICU Transfer Note    Transfer from: MICU    Transfer to: ( x ) Medicine    (  ) Telemetry     (   ) RCU        (    ) Palliative         (   ) Stroke Unit          (   ) __________________    Accepting physician:      MICU COURSE:    43F h/o CF adm 2/14 for bactrim de-sensitization.    Recently, on 2/9, the patient was started on TMP-SMX and polymyxin E in the outpatient setting via PICC.  When she was receiving TMP-SMX, she began to have wheezing, scratchy throat, and SOB.  The infusion was stopped, and she took diphenhydramine.  The patient was switched to imipenem but had a similar reaction.  Her pulmonologist advised her to come to the hospital for de-sensitization.    The patient was tx to the MICU 2/20 for TMP-SMX de-sensitization.  She underwent de-sensitization and was monitored per A&I recommendations.    ASSESSMENT & PLAN:     43F h/o CF adm 2/14 for bactrim de-sensitization.    #Neuro  - Ambulating, alert.    #CV  - Hemodynamically stable at this time.    #Resp  Suspected TMP-SMX Allergy  - A&I on board.  - Desensitization challenge performed in MICU.  - F/u A&I recs.    CF  - Albuterol nebs q6h, symbicort, hypertonic saline, dornase BID, chest vest.  - Pulm on board.  - Sputum cx with normal respiratory jacquie.    #GI  - No acute issues.    #Renal  - No acute issues.    #ID  - No obvious active infection at this time.    #Endo  - No acute issues.    #Heme  Anemia  - Mild with Hgb in 11s, at baseline.  - Continue to monitor.    #FEN  - Regular diet.    #DVT PPX  - IMPROVE 0, no pharmacologic DVT PPX at this time.    #Code Status  - Full code.        For Followup:    [ ] f/u Pulm recs      Vital Signs Last 24 Hrs  T(C): 36.7 (20 Feb 2019 15:29), Max: 36.8 (20 Feb 2019 12:12)  T(F): 98.1 (20 Feb 2019 15:29), Max: 98.3 (20 Feb 2019 12:12)  HR: 89 (20 Feb 2019 16:00) (77 - 99)  BP: 119/73 (20 Feb 2019 16:00) (116/67 - 144/84)  BP(mean): 87 (20 Feb 2019 16:00) (87 - 101)  RR: 17 (20 Feb 2019 16:00) (17 - 26)  SpO2: 98% (20 Feb 2019 16:00) (97% - 100%)  I&O's Summary      MEDICATIONS  (STANDING):  ALBUTerol    0.083% 2.5 milliGRAM(s) Nebulizer every 6 hours  ALBUTerol    90 MICROgram(s) HFA Inhaler 1 Puff(s) Inhalation every 4 hours  buDESOnide 160 MICROgram(s)/formoterol 4.5 MICROgram(s) Inhaler 2 Puff(s) Inhalation two times a day  diphenhydrAMINE 50 milliGRAM(s) Oral every 6 hours  dornase diana Solution 2.5 milliGRAM(s) Inhalation two times a day  famotidine    Tablet 40 milliGRAM(s) Oral two times a day  lidocaine   Patch 1 Patch Transdermal every 24 hours  predniSONE   Tablet 40 milliGRAM(s) Oral daily  sodium chloride 7% Inhalation 5 milliLiter(s) Inhalation two times a day  trimethoprim / sulfamethoxazole IVPB 100 milliGRAM(s) IV Intermittent once  trimethoprim / sulfamethoxazole IVPB 200 milliGRAM(s) IV Intermittent once    MEDICATIONS  (PRN):  acetaminophen    Suspension .. 650 milliGRAM(s) Oral every 6 hours PRN Mild Pain (1 - 3), Moderate Pain (4 - 6), Severe Pain (7 - 10)  benzocaine 15 mG/menthol 3.6 mG (Sugar-Free) Lozenge 1 Lozenge Oral every 2 hours PRN Sore Throat  EPINEPHrine     1 mG/mL Injectable 0.3 milliGRAM(s) IntraMuscular once PRN SOB/wheezing        LABS MICU Transfer Note    Transfer from: MICU    Transfer to: ( x ) Medicine    (  ) Telemetry     (   ) RCU        (    ) Palliative         (   ) Stroke Unit          (   ) __________________    Accepting physician:      MICU COURSE:    43F h/o CF adm 2/14 for bactrim de-sensitization.    Recently, on 2/9, the patient was started on TMP-SMX and polymyxin E in the outpatient setting via PICC.  When she was receiving TMP-SMX, she began to have wheezing, scratchy throat, and SOB.  The infusion was stopped, and she took diphenhydramine.  The patient was switched to imipenem but had a similar reaction.  Her pulmonologist advised her to come to the hospital for de-sensitization.    The patient was tx to the MICU 2/20 for TMP-SMX de-sensitization.  She underwent de-sensitization and was monitored per A&I recommendations.    ASSESSMENT & PLAN:     43F h/o CF adm 2/14 for bactrim de-sensitization.    #Neuro  - Ambulating, alert.    #CV  - Hemodynamically stable at this time.    #Resp  Suspected TMP-SMX Allergy  - A&I on board.  - Desensitization challenge performed in MICU.  - F/u A&I recs.    CF  - Albuterol nebs q6h, symbicort, hypertonic saline, dornase BID, chest vest.  - Pulm on board.  - Sputum cx with normal respiratory jacquie.  - Continue TMP- BID.    #GI  - No acute issues.    #Renal  - No acute issues.    #ID  - No obvious active infection at this time.    #Endo  - No acute issues.    #Heme  Anemia  - Mild with Hgb in 11s, at baseline.  - Continue to monitor.    #FEN  - Regular diet.    #DVT PPX  - IMPROVE 0, no pharmacologic DVT PPX at this time.    #Code Status  - Full code.        For Followup:    [ ] f/u Pulm recs      Vital Signs Last 24 Hrs  T(C): 36.7 (20 Feb 2019 15:29), Max: 36.8 (20 Feb 2019 12:12)  T(F): 98.1 (20 Feb 2019 15:29), Max: 98.3 (20 Feb 2019 12:12)  HR: 89 (20 Feb 2019 16:00) (77 - 99)  BP: 119/73 (20 Feb 2019 16:00) (116/67 - 144/84)  BP(mean): 87 (20 Feb 2019 16:00) (87 - 101)  RR: 17 (20 Feb 2019 16:00) (17 - 26)  SpO2: 98% (20 Feb 2019 16:00) (97% - 100%)  I&O's Summary      MEDICATIONS  (STANDING):  ALBUTerol    0.083% 2.5 milliGRAM(s) Nebulizer every 6 hours  ALBUTerol    90 MICROgram(s) HFA Inhaler 1 Puff(s) Inhalation every 4 hours  buDESOnide 160 MICROgram(s)/formoterol 4.5 MICROgram(s) Inhaler 2 Puff(s) Inhalation two times a day  diphenhydrAMINE 50 milliGRAM(s) Oral every 6 hours  dornase diana Solution 2.5 milliGRAM(s) Inhalation two times a day  famotidine    Tablet 40 milliGRAM(s) Oral two times a day  lidocaine   Patch 1 Patch Transdermal every 24 hours  predniSONE   Tablet 40 milliGRAM(s) Oral daily  sodium chloride 7% Inhalation 5 milliLiter(s) Inhalation two times a day  trimethoprim / sulfamethoxazole IVPB 100 milliGRAM(s) IV Intermittent once  trimethoprim / sulfamethoxazole IVPB 200 milliGRAM(s) IV Intermittent once    MEDICATIONS  (PRN):  acetaminophen    Suspension .. 650 milliGRAM(s) Oral every 6 hours PRN Mild Pain (1 - 3), Moderate Pain (4 - 6), Severe Pain (7 - 10)  benzocaine 15 mG/menthol 3.6 mG (Sugar-Free) Lozenge 1 Lozenge Oral every 2 hours PRN Sore Throat  EPINEPHrine     1 mG/mL Injectable 0.3 milliGRAM(s) IntraMuscular once PRN SOB/wheezing        LABS MICU Transfer Note    Transfer from: MICU    Transfer to: ( x ) Medicine    (  ) Telemetry     (   ) RCU        (    ) Palliative         (   ) Stroke Unit          (   ) __________________    Accepting physician:      MICU COURSE:    43F h/o CF adm 2/14 for bactrim de-sensitization.    Recently, on 2/9, the patient was started on TMP-SMX and polymyxin E in the outpatient setting via PICC.  When she was receiving TMP-SMX, she began to have wheezing, scratchy throat, and SOB.  The infusion was stopped, and she took diphenhydramine.  The patient was switched to imipenem but had a similar reaction.  Her pulmonologist advised her to come to the hospital for de-sensitization.    The patient was tx to the MICU 2/20 for TMP-SMX de-sensitization.  She underwent de-sensitization and was monitored per A&I recommendations.    ASSESSMENT & PLAN:     43F h/o CF adm 2/14 for bactrim de-sensitization.    #Neuro  - Ambulating, alert.    #CV  - Hemodynamically stable at this time.    #Resp  Suspected TMP-SMX Allergy  - A&I on board.  - Desensitization challenge performed in MICU.  - F/u A&I recs.    CF  - Albuterol nebs q6h, symbicort, hypertonic saline, dornase BID, chest vest.  - Pulm on board.  - Sputum cx with normal respiratory jacquie.  - Continue TMP- BID.    #GI  - No acute issues.    #Renal  - No acute issues.    #ID  - No obvious active infection at this time.    #Endo  - No acute issues.    #Heme  Anemia  - Mild with Hgb in 11s, at baseline.  - Continue to monitor.    #FEN  - Regular diet.    #DVT PPX  - IMPROVE 0, no pharmacologic DVT PPX at this time.    #Code Status  - Full code.        For Followup:    [ ] f/u Pulm recs      Vital Signs Last 24 Hrs  T(C): 36.7 (20 Feb 2019 15:29), Max: 36.8 (20 Feb 2019 12:12)  T(F): 98.1 (20 Feb 2019 15:29), Max: 98.3 (20 Feb 2019 12:12)  HR: 89 (20 Feb 2019 16:00) (77 - 99)  BP: 119/73 (20 Feb 2019 16:00) (116/67 - 144/84)  BP(mean): 87 (20 Feb 2019 16:00) (87 - 101)  RR: 17 (20 Feb 2019 16:00) (17 - 26)  SpO2: 98% (20 Feb 2019 16:00) (97% - 100%)  I&O's Summary      MEDICATIONS  (STANDING):  ALBUTerol    0.083% 2.5 milliGRAM(s) Nebulizer every 6 hours  ALBUTerol    90 MICROgram(s) HFA Inhaler 1 Puff(s) Inhalation every 4 hours  buDESOnide 160 MICROgram(s)/formoterol 4.5 MICROgram(s) Inhaler 2 Puff(s) Inhalation two times a day  diphenhydrAMINE 50 milliGRAM(s) Oral every 6 hours  dornase diana Solution 2.5 milliGRAM(s) Inhalation two times a day  famotidine    Tablet 40 milliGRAM(s) Oral two times a day  lidocaine   Patch 1 Patch Transdermal every 24 hours  predniSONE   Tablet 40 milliGRAM(s) Oral daily  sodium chloride 7% Inhalation 5 milliLiter(s) Inhalation two times a day  trimethoprim / sulfamethoxazole IVPB 100 milliGRAM(s) IV Intermittent once  trimethoprim / sulfamethoxazole IVPB 200 milliGRAM(s) IV Intermittent once    MEDICATIONS  (PRN):  acetaminophen    Suspension .. 650 milliGRAM(s) Oral every 6 hours PRN Mild Pain (1 - 3), Moderate Pain (4 - 6), Severe Pain (7 - 10)  benzocaine 15 mG/menthol 3.6 mG (Sugar-Free) Lozenge 1 Lozenge Oral every 2 hours PRN Sore Throat  EPINEPHrine     1 mG/mL Injectable 0.3 milliGRAM(s) IntraMuscular once PRN SOB/wheezing    GENERAL: Sitting comfortable in bed, in no acute distress  HENMT: Atraumatic, moist mucous membranes, no oropharyngeal exudates or vesicles, uvula is midline  EYES: Clear bilaterally, PERRL, EOMs intact b/l  HEART: RRR, S1/S2, no murmur/gallops/rubs  RESPIRATORY: Clear to auscultation bilaterally, no wheezes/rhonchi/rales  ABDOMEN: +BS, soft, nontender, nondistended  EXTREMITIES: No lower extremity edema, +2 radial pulses b/l  NEURO:  A&Ox4, no focal motor deficits or sensory deficits   Heme/LYMPH: No ecchymosis or bruising, no anterior/posterior cervical or supraclavicular LAD  SKIN:  Skin normal color for race, warm, dry and intact. No evidence of rash.      LABS MICU Transfer Note    Transfer from: MICU    Transfer to: ( x ) Medicine    (  ) Telemetry     (   ) RCU        (    ) Palliative         (   ) Stroke Unit          (   ) __________________    Accepting physician: Dr. Hollis (via Pulmonology Fellow)      MICU COURSE:    43F h/o CF adm 2/14 for bactrim de-sensitization.    Recently, on 2/9, the patient was started on TMP-SMX and polymyxin E in the outpatient setting via PICC.  When she was receiving TMP-SMX, she began to have wheezing, scratchy throat, and SOB.  The infusion was stopped, and she took diphenhydramine.  The patient was switched to imipenem but had a similar reaction.  Her pulmonologist advised her to come to the hospital for de-sensitization.    The patient was tx to the MICU 2/20 for TMP-SMX de-sensitization.  She underwent de-sensitization and was monitored per A&I recommendations.    ASSESSMENT & PLAN:     43F h/o CF adm 2/14 for bactrim de-sensitization.    #Neuro  - Ambulating, alert.    #CV  - Hemodynamically stable at this time.    #Resp  Suspected TMP-SMX Allergy  - A&I on board.  - Desensitization challenge performed in MICU.  - F/u A&I recs.    CF  - Albuterol nebs q6h, symbicort, hypertonic saline, dornase BID, chest vest.  - Pulm on board.  - Sputum cx with normal respiratory jacquie.  - Continue TMP- BID.    #GI  - No acute issues.    #Renal  - No acute issues.    #ID  - No obvious active infection at this time.    #Endo  - No acute issues.    #Heme  Anemia  - Mild with Hgb in 11s, at baseline.  - Continue to monitor.    #FEN  - Regular diet.    #DVT PPX  - IMPROVE 0, no pharmacologic DVT PPX at this time.    #Code Status  - Full code.        For Followup:    [ ] f/u Pulm recs      Vital Signs Last 24 Hrs  T(C): 36.7 (20 Feb 2019 15:29), Max: 36.8 (20 Feb 2019 12:12)  T(F): 98.1 (20 Feb 2019 15:29), Max: 98.3 (20 Feb 2019 12:12)  HR: 89 (20 Feb 2019 16:00) (77 - 99)  BP: 119/73 (20 Feb 2019 16:00) (116/67 - 144/84)  BP(mean): 87 (20 Feb 2019 16:00) (87 - 101)  RR: 17 (20 Feb 2019 16:00) (17 - 26)  SpO2: 98% (20 Feb 2019 16:00) (97% - 100%)  I&O's Summary      MEDICATIONS  (STANDING):  ALBUTerol    0.083% 2.5 milliGRAM(s) Nebulizer every 6 hours  ALBUTerol    90 MICROgram(s) HFA Inhaler 1 Puff(s) Inhalation every 4 hours  buDESOnide 160 MICROgram(s)/formoterol 4.5 MICROgram(s) Inhaler 2 Puff(s) Inhalation two times a day  diphenhydrAMINE 50 milliGRAM(s) Oral every 6 hours  dornase diana Solution 2.5 milliGRAM(s) Inhalation two times a day  famotidine    Tablet 40 milliGRAM(s) Oral two times a day  lidocaine   Patch 1 Patch Transdermal every 24 hours  predniSONE   Tablet 40 milliGRAM(s) Oral daily  sodium chloride 7% Inhalation 5 milliLiter(s) Inhalation two times a day  trimethoprim / sulfamethoxazole IVPB 100 milliGRAM(s) IV Intermittent once  trimethoprim / sulfamethoxazole IVPB 200 milliGRAM(s) IV Intermittent once    MEDICATIONS  (PRN):  acetaminophen    Suspension .. 650 milliGRAM(s) Oral every 6 hours PRN Mild Pain (1 - 3), Moderate Pain (4 - 6), Severe Pain (7 - 10)  benzocaine 15 mG/menthol 3.6 mG (Sugar-Free) Lozenge 1 Lozenge Oral every 2 hours PRN Sore Throat  EPINEPHrine     1 mG/mL Injectable 0.3 milliGRAM(s) IntraMuscular once PRN SOB/wheezing    GENERAL: Sitting comfortable in bed, in no acute distress  HENMT: Atraumatic, moist mucous membranes, no oropharyngeal exudates or vesicles, uvula is midline  EYES: Clear bilaterally, PERRL, EOMs intact b/l  HEART: RRR, S1/S2, no murmur/gallops/rubs  RESPIRATORY: Clear to auscultation bilaterally, no wheezes/rhonchi/rales  ABDOMEN: +BS, soft, nontender, nondistended  EXTREMITIES: No lower extremity edema, +2 radial pulses b/l  NEURO:  A&Ox4, no focal motor deficits or sensory deficits   Heme/LYMPH: No ecchymosis or bruising, no anterior/posterior cervical or supraclavicular LAD  SKIN:  Skin normal color for race, warm, dry and intact. No evidence of rash.      LABS

## 2019-02-20 NOTE — PROGRESS NOTE ADULT - SUBJECTIVE AND OBJECTIVE BOX
Patient is a 43y old  Female who presents with a chief complaint of Allergic Reaction (19 Feb 2019 13:52)      SUBJECTIVE / OVERNIGHT EVENTS:  Patient seen and examined at bedside. No acute events overnight.    Other Review of Systems Negative.    MEDICATIONS  (STANDING):  ALBUTerol    0.083% 2.5 milliGRAM(s) Nebulizer every 6 hours  ALBUTerol    90 MICROgram(s) HFA Inhaler 1 Puff(s) Inhalation every 4 hours  buDESOnide 160 MICROgram(s)/formoterol 4.5 MICROgram(s) Inhaler 2 Puff(s) Inhalation two times a day  diphenhydrAMINE 50 milliGRAM(s) Oral every 6 hours  dornase diana Solution 2.5 milliGRAM(s) Inhalation two times a day  famotidine    Tablet 40 milliGRAM(s) Oral two times a day  lidocaine   Patch 1 Patch Transdermal every 24 hours  predniSONE   Tablet 40 milliGRAM(s) Oral daily  sodium chloride 7% Inhalation 5 milliLiter(s) Inhalation two times a day    MEDICATIONS  (PRN):  acetaminophen    Suspension .. 650 milliGRAM(s) Oral every 6 hours PRN Mild Pain (1 - 3), Moderate Pain (4 - 6), Severe Pain (7 - 10)  benzocaine 15 mG/menthol 3.6 mG (Sugar-Free) Lozenge 1 Lozenge Oral every 2 hours PRN Sore Throat      OBJECTIVE:    Vital Signs Last 24 Hrs  T(C): 36.8 (20 Feb 2019 12:12), Max: 36.8 (20 Feb 2019 12:12)  T(F): 98.3 (20 Feb 2019 12:12), Max: 98.3 (20 Feb 2019 12:12)  HR: 78 (20 Feb 2019 12:12) (77 - 99)  BP: 119/76 (20 Feb 2019 12:12) (116/67 - 119/76)  BP(mean): --  RR: 18 (20 Feb 2019 12:12) (18 - 19)  SpO2: 98% (20 Feb 2019 12:12) (97% - 98%)    PHYSICAL EXAM:  GENERAL: NAD, well-developed  HEAD:  Atraumatic, Normocephalic  CHEST/LUNG: Clear to auscultation bilaterally; No wheeze  HEART: Regular rate and rhythm; No murmurs, rubs, or gallops  ABDOMEN: Soft, Nontender, Nondistended; Bowel sounds present  EXTREMITIES:  2+ Peripheral Pulses, No clubbing, cyanosis, or edema  PSYCH: AAOx3  NEUROLOGY: non-focal  SKIN: No rashes or lesions    Care Discussed with Consultants/Other Providers:  care discussed with pulm

## 2019-02-20 NOTE — CHART NOTE - NSCHARTNOTEFT_GEN_A_CORE
Transfer from: MICU    Transfer to: ( x ) Medicine    (  ) Telemetry     (   ) RCU        (    ) Palliative         (   ) Stroke Unit          (   ) __________________    Accepting physician:      MICU COURSE:    43F h/o CF adm 2/14 for bactrim de-sensitization.    Recently, on 2/9, the patient was started on TMP-SMX and polymyxin E in the outpatient setting via PICC.  When she was receiving TMP-SMX, she began to have wheezing, scratchy throat, and SOB.  The infusion was stopped, and she took diphenhydramine.  The patient was switched to imipenem but had a similar reaction.  Her pulmonologist advised her to come to the hospital for de-sensitization.    The patient was tx to the MICU 2/20 for TMP-SMX de-sensitization.  She underwent de-sensitization and was monitored per A&I recommendations.    ASSESSMENT & PLAN:     43F h/o CF adm 2/14 for bactrim de-sensitization.    To do:   []keep epi at bedside if pt develops any signs of anaphylaxis   []follow up pulmonary recs Transfer from: MICU    Transfer to: ( x ) Medicine    (  ) Telemetry     (   ) RCU        (    ) Palliative         (   ) Stroke Unit          (   ) __________________    Accepting physician:      MICU COURSE:    43F h/o CF adm 2/14 for bactrim de-sensitization.    Recently, on 2/9, the patient was started on TMP-SMX and polymyxin E in the outpatient setting via PICC.  When she was receiving TMP-SMX, she began to have wheezing, scratchy throat, and SOB.  The infusion was stopped, and she took diphenhydramine.  The patient was switched to imipenem but had a similar reaction.  Her pulmonologist advised her to come to the hospital for de-sensitization.    The patient was tx to the MICU 2/20 for TMP-SMX de-sensitization.  She underwent de-sensitization and was monitored per A&I recommendations.    ASSESSMENT & PLAN:     43F h/o CF adm 2/14 for bactrim de-sensitization.    To do:   []keep epi at bedside if pt develops any signs of anaphylaxis   []clarify with pulmonary if patient should be on bactrim for CF exacerbation or for PCP ppx

## 2019-02-20 NOTE — CHART NOTE - NSCHARTNOTEFT_GEN_A_CORE
MICU ACCEPT NOTE    Patient is a 43y old  Female who presents with a chief complaint of Allergic Reaction      HPI:  43F h/o CF adm  for bactrim de-sensitization.    Recently, on , the patient was started on TMP-SMX and polymyxin E in the outpatient setting via PICC.  When she was receiving TMP-SMX, she began to have wheezing, scratchy throat, and SOB.  The infusion was stopped, and she took diphenhydramine.  The patient was switched to imipenem but had a similar reaction.  Her pulmonologist advised her to come to the hospital for de-sensitization.      PMHx/PSHx:   PAST MEDICAL & SURGICAL HISTORY:  Deviated septum  CF (Cystic Fibrosis)  sinus surgery-    (Normal Spontaneous Vaginal Delivery)      Social Hx: per H&P: Pt lives with  in private residence. Social wine drinker. Denies tobacco or illicit drug use.    Allergies: Allergies    adhesives (Rash)  chlorhexidine containing compounds (Rash)  clindamycin (Hives)  fluoroquinolone antibiotics (Unknown)  levofloxacin (Hives)  macrolide antibiotics (Other)  penicillin (Hives)  Zithromax Z-Ivan (Hives)    Intolerances        Medications Standing   MEDICATIONS  (STANDING):  ALBUTerol    0.083% 2.5 milliGRAM(s) Nebulizer every 6 hours  ALBUTerol    90 MICROgram(s) HFA Inhaler 1 Puff(s) Inhalation every 4 hours  buDESOnide 160 MICROgram(s)/formoterol 4.5 MICROgram(s) Inhaler 2 Puff(s) Inhalation two times a day  diphenhydrAMINE 50 milliGRAM(s) Oral every 6 hours  dornase diana Solution 2.5 milliGRAM(s) Inhalation two times a day  famotidine    Tablet 40 milliGRAM(s) Oral two times a day  lidocaine   Patch 1 Patch Transdermal every 24 hours  predniSONE   Tablet 40 milliGRAM(s) Oral daily  sodium chloride 7% Inhalation 5 milliLiter(s) Inhalation two times a day      Medications PRN   MEDICATIONS  (PRN):  acetaminophen    Suspension .. 650 milliGRAM(s) Oral every 6 hours PRN Mild Pain (1 - 3), Moderate Pain (4 - 6), Severe Pain (7 - 10)  benzocaine 15 mG/menthol 3.6 mG (Sugar-Free) Lozenge 1 Lozenge Oral every 2 hours PRN Sore Throat      Physical Exam:   T(C): 36.8 (19 @ 12:12), Max: 36.8 (19 @ 12:12)  HR: 78 (19 @ 12:12) (77 - 99)  BP: 119/76 (19 @ 12:12) (116/67 - 119/76)  RR: 18 (19 @ 12:12) (18 - 19)  SpO2: 98% (19 @ 12:12) (97% - 98%)    Gen:  HENT:  Lymph:  Eye:  CV:  Pulm:  Abd:  Skin:  Ext:  Neuro:  Psych:      LABS   CBC   CMP           Radiology:       A/P:  43F h/o CF adm  for bactrim de-sensitization.    #Neuro  - Ambulating, alert.    #CV  - Hemodynamically stable at this time.    #Resp  Suspected TMP-SMX Allergy  - A&I on board.  - Desensitization challenge - 10% of total dose, observe for 20mins, then 20% of total dose, observe for 20 mins, then the rest of the dose.  Then will monitor for 2 hours after completion of infusion.  - If patient with globus sensation, call ENT to perform laryngoscopy and obtain tryptase 90-120mins after onset of the reaction.    CF  - Albuterol nebs q6h, hypertonic saline, pulmozyme BID, chest vest.  - Pulm on board.  - Sputum cx with normal respiratory jacquie.    #GI  - No acute issues.    #Renal  - No acute issues.    #ID  - No obvious active infection at this time.    #Endo  - No acute issues.    #Heme  Anemia  - Mild with Hgb in 11s, at baseline.  - Continue to monitor.    #FEN  - Regular diet.    #DVT PPX  - IMPROVE 0, no pharmacologic DVT PPX at this time.    #Code Status  - Full code. MICU ACCEPT NOTE    Patient is a 43y old  Female who presents with a chief complaint of Allergic Reaction      HPI:  43F h/o CF adm  for bactrim de-sensitization.    Recently, on , the patient was started on TMP-SMX and polymyxin E in the outpatient setting via PICC.  When she was receiving TMP-SMX, she began to have wheezing, scratchy throat, and SOB.  The infusion was stopped, and she took diphenhydramine.  The patient was switched to imipenem but had a similar reaction.  Her pulmonologist advised her to come to the hospital for de-sensitization.      PMHx/PSHx:   PAST MEDICAL & SURGICAL HISTORY:  Deviated septum  CF (Cystic Fibrosis)  sinus surgery-    (Normal Spontaneous Vaginal Delivery)      Social Hx: per H&P: Pt lives with  in private residence. Social wine drinker. Denies tobacco or illicit drug use.    Allergies: Allergies    adhesives (Rash)  chlorhexidine containing compounds (Rash)  clindamycin (Hives)  fluoroquinolone antibiotics (Unknown)  levofloxacin (Hives)  macrolide antibiotics (Other)  penicillin (Hives)  Zithromax Z-Ivan (Hives)    Intolerances        Medications Standing   MEDICATIONS  (STANDING):  ALBUTerol    0.083% 2.5 milliGRAM(s) Nebulizer every 6 hours  ALBUTerol    90 MICROgram(s) HFA Inhaler 1 Puff(s) Inhalation every 4 hours  buDESOnide 160 MICROgram(s)/formoterol 4.5 MICROgram(s) Inhaler 2 Puff(s) Inhalation two times a day  diphenhydrAMINE 50 milliGRAM(s) Oral every 6 hours  dornase diana Solution 2.5 milliGRAM(s) Inhalation two times a day  famotidine    Tablet 40 milliGRAM(s) Oral two times a day  lidocaine   Patch 1 Patch Transdermal every 24 hours  predniSONE   Tablet 40 milliGRAM(s) Oral daily  sodium chloride 7% Inhalation 5 milliLiter(s) Inhalation two times a day      Medications PRN   MEDICATIONS  (PRN):  acetaminophen    Suspension .. 650 milliGRAM(s) Oral every 6 hours PRN Mild Pain (1 - 3), Moderate Pain (4 - 6), Severe Pain (7 - 10)  benzocaine 15 mG/menthol 3.6 mG (Sugar-Free) Lozenge 1 Lozenge Oral every 2 hours PRN Sore Throat      Physical Exam:   T(C): 36.8 (19 @ 12:12), Max: 36.8 (19 @ 12:12)  HR: 78 (19 @ 12:12) (77 - 99)  BP: 119/76 (19 @ 12:12) (116/67 - 119/76)  RR: 18 (19 @ 12:12) (18 - 19)  SpO2: 98% (19 @ 12:12) (97% - 98%)    Gen:  HENT:  Lymph:  Eye:  CV:  Pulm:  Abd:  Skin:  Ext:  Neuro:  Psych:      LABS   CBC   CMP           Radiology:       A/P:  43F h/o CF adm  for bactrim de-sensitization.    #Neuro  - Ambulating, alert.    #CV  - Hemodynamically stable at this time.    #Resp  Suspected TMP-SMX Allergy  - A&I on board.  - Desensitization challenge - 10% of total dose, observe for 20mins, then 20% of total dose, observe for 20 mins, then the rest of the dose.  Then will monitor for 2 hours after completion of infusion.  - If patient with globus sensation, call ENT to perform laryngoscopy and obtain tryptase 90-120mins after onset of the reaction.  - Continue diphenhydramine, famotidine, prednisone.    CF  - Albuterol nebs q6h, symbicort, hypertonic saline, dornase BID, chest vest.  - Pulm on board.  - Sputum cx with normal respiratory jacquie.    #GI  - No acute issues.    #Renal  - No acute issues.    #ID  - No obvious active infection at this time.    #Endo  - No acute issues.    #Heme  Anemia  - Mild with Hgb in 11s, at baseline.  - Continue to monitor.    #FEN  - Regular diet.    #DVT PPX  - IMPROVE 0, no pharmacologic DVT PPX at this time.    #Code Status  - Full code. MICU ACCEPT NOTE    Patient is a 43y old  Female who presents with a chief complaint of Allergic Reaction      HPI:  43F h/o CF adm  for bactrim de-sensitization.    Recently, on , the patient was started on TMP-SMX and polymyxin E in the outpatient setting via PICC.  When she was receiving TMP-SMX, she began to have wheezing, scratchy throat, and SOB.  The infusion was stopped, and she took diphenhydramine.  The patient was switched to imipenem but had a similar reaction.  Her pulmonologist advised her to come to the hospital for de-sensitization.      PMHx/PSHx:   PAST MEDICAL & SURGICAL HISTORY:  Deviated septum  CF (Cystic Fibrosis)  sinus surgery-    (Normal Spontaneous Vaginal Delivery)      Social Hx: per H&P: Pt lives with  in private residence. Social wine drinker. Denies tobacco or illicit drug use.    Allergies: Allergies    adhesives (Rash)  chlorhexidine containing compounds (Rash)  clindamycin (Hives)  fluoroquinolone antibiotics (Unknown)  levofloxacin (Hives)  macrolide antibiotics (Other)  penicillin (Hives)  Zithromax Z-Ivan (Hives)    Medications Standing   MEDICATIONS  (STANDING):  ALBUTerol    0.083% 2.5 milliGRAM(s) Nebulizer every 6 hours  ALBUTerol    90 MICROgram(s) HFA Inhaler 1 Puff(s) Inhalation every 4 hours  buDESOnide 160 MICROgram(s)/formoterol 4.5 MICROgram(s) Inhaler 2 Puff(s) Inhalation two times a day  diphenhydrAMINE 50 milliGRAM(s) Oral every 6 hours  dornase diana Solution 2.5 milliGRAM(s) Inhalation two times a day  famotidine    Tablet 40 milliGRAM(s) Oral two times a day  lidocaine   Patch 1 Patch Transdermal every 24 hours  predniSONE   Tablet 40 milliGRAM(s) Oral daily  sodium chloride 7% Inhalation 5 milliLiter(s) Inhalation two times a day      Medications PRN   MEDICATIONS  (PRN):  acetaminophen    Suspension .. 650 milliGRAM(s) Oral every 6 hours PRN Mild Pain (1 - 3), Moderate Pain (4 - 6), Severe Pain (7 - 10)  benzocaine 15 mG/menthol 3.6 mG (Sugar-Free) Lozenge 1 Lozenge Oral every 2 hours PRN Sore Throat      Physical Exam:   T(C): 36.8 (19 @ 12:12), Max: 36.8 (19 @ 12:12)  HR: 78 (19 @ 12:12) (77 - 99)  BP: 119/76 (19 @ 12:12) (116/67 - 119/76)  RR: 18 (19 @ 12:12) (18 - 19)  SpO2: 98% (19 @ 12:12) (97% - 98%)    GENERAL: Sitting comfortable in bed, in no acute distress  HENMT: Atraumatic, moist mucous membranes, no oropharyngeal exudates or vesicles, uvula is midline  EYES: Clear bilaterally, PERRL, EOMs intact b/l  HEART: RRR, S1/S2, no murmur/gallops/rubs  RESPIRATORY: Clear to auscultation bilaterally, no wheezes/rhonchi/rales  ABDOMEN: +BS, soft, nontender, nondistended  EXTREMITIES: No lower extremity edema, +2 radial pulses b/l  NEURO:  A&Ox4, no focal motor deficits or sensory deficits   Heme/LYMPH: No ecchymosis or bruising, no anterior/posterior cervical or supraclavicular LAD  SKIN:  Skin normal color for race, warm, dry and intact. No evidence of rash.        LABS   CBC   CMP           Radiology:       A/P:  43F h/o CF adm  for bactrim de-sensitization.    #Neuro  - Ambulating, alert.    #CV  - Hemodynamically stable at this time.    #Resp  Suspected TMP-SMX Allergy  - A&I on board.  - Desensitization challenge - 10% of total dose, observe for 20mins, then 20% of total dose, observe for 20 mins, then the rest of the dose.  Then will monitor for 2 hours after completion of infusion.  - If patient with globus sensation, call ENT to perform laryngoscopy and obtain tryptase 90-120mins after onset of the reaction.  - Continue diphenhydramine, famotidine, prednisone.    CF  - Albuterol nebs q6h, symbicort, hypertonic saline, dornase BID, chest vest.  - Pulm on board.  - Sputum cx with normal respiratory jacquie.    #GI  - No acute issues.    #Renal  - No acute issues.    #ID  - No obvious active infection at this time.    #Endo  - No acute issues.    #Heme  Anemia  - Mild with Hgb in 11s, at baseline.  - Continue to monitor.    #FEN  - Regular diet.    #DVT PPX  - IMPROVE 0, no pharmacologic DVT PPX at this time.    #Code Status  - Full code.

## 2019-02-21 LAB
ALBUMIN SERPL ELPH-MCNC: 3.7 G/DL — SIGNIFICANT CHANGE UP (ref 3.3–5)
ALP SERPL-CCNC: 51 U/L — SIGNIFICANT CHANGE UP (ref 40–120)
ALT FLD-CCNC: 13 U/L — SIGNIFICANT CHANGE UP (ref 4–33)
ANION GAP SERPL CALC-SCNC: 10 MMO/L — SIGNIFICANT CHANGE UP (ref 7–14)
AST SERPL-CCNC: 13 U/L — SIGNIFICANT CHANGE UP (ref 4–32)
BASOPHILS # BLD AUTO: 0.03 K/UL — SIGNIFICANT CHANGE UP (ref 0–0.2)
BASOPHILS NFR BLD AUTO: 0.2 % — SIGNIFICANT CHANGE UP (ref 0–2)
BILIRUB SERPL-MCNC: 0.3 MG/DL — SIGNIFICANT CHANGE UP (ref 0.2–1.2)
BUN SERPL-MCNC: 12 MG/DL — SIGNIFICANT CHANGE UP (ref 7–23)
CALCIUM SERPL-MCNC: 8.5 MG/DL — SIGNIFICANT CHANGE UP (ref 8.4–10.5)
CHLORIDE SERPL-SCNC: 103 MMOL/L — SIGNIFICANT CHANGE UP (ref 98–107)
CO2 SERPL-SCNC: 26 MMOL/L — SIGNIFICANT CHANGE UP (ref 22–31)
CREAT SERPL-MCNC: 0.71 MG/DL — SIGNIFICANT CHANGE UP (ref 0.5–1.3)
EOSINOPHIL # BLD AUTO: 0.41 K/UL — SIGNIFICANT CHANGE UP (ref 0–0.5)
EOSINOPHIL NFR BLD AUTO: 3.4 % — SIGNIFICANT CHANGE UP (ref 0–6)
GLUCOSE SERPL-MCNC: 82 MG/DL — SIGNIFICANT CHANGE UP (ref 70–99)
HCT VFR BLD CALC: 36.7 % — SIGNIFICANT CHANGE UP (ref 34.5–45)
HGB BLD-MCNC: 11.2 G/DL — LOW (ref 11.5–15.5)
IMM GRANULOCYTES NFR BLD AUTO: 0.7 % — SIGNIFICANT CHANGE UP (ref 0–1.5)
LYMPHOCYTES # BLD AUTO: 25.3 % — SIGNIFICANT CHANGE UP (ref 13–44)
LYMPHOCYTES # BLD AUTO: 3.08 K/UL — SIGNIFICANT CHANGE UP (ref 1–3.3)
MAGNESIUM SERPL-MCNC: 2.1 MG/DL — SIGNIFICANT CHANGE UP (ref 1.6–2.6)
MCHC RBC-ENTMCNC: 29.6 PG — SIGNIFICANT CHANGE UP (ref 27–34)
MCHC RBC-ENTMCNC: 30.5 % — LOW (ref 32–36)
MCV RBC AUTO: 97.1 FL — SIGNIFICANT CHANGE UP (ref 80–100)
MONOCYTES # BLD AUTO: 1.11 K/UL — HIGH (ref 0–0.9)
MONOCYTES NFR BLD AUTO: 9.1 % — SIGNIFICANT CHANGE UP (ref 2–14)
NEUTROPHILS # BLD AUTO: 7.44 K/UL — HIGH (ref 1.8–7.4)
NEUTROPHILS NFR BLD AUTO: 61.3 % — SIGNIFICANT CHANGE UP (ref 43–77)
NRBC # FLD: 0 K/UL — LOW (ref 25–125)
PHOSPHATE SERPL-MCNC: 3.1 MG/DL — SIGNIFICANT CHANGE UP (ref 2.5–4.5)
PLATELET # BLD AUTO: 358 K/UL — SIGNIFICANT CHANGE UP (ref 150–400)
PMV BLD: 9.4 FL — SIGNIFICANT CHANGE UP (ref 7–13)
POTASSIUM SERPL-MCNC: 3.7 MMOL/L — SIGNIFICANT CHANGE UP (ref 3.5–5.3)
POTASSIUM SERPL-SCNC: 3.7 MMOL/L — SIGNIFICANT CHANGE UP (ref 3.5–5.3)
PROT SERPL-MCNC: 6.7 G/DL — SIGNIFICANT CHANGE UP (ref 6–8.3)
RBC # BLD: 3.78 M/UL — LOW (ref 3.8–5.2)
RBC # FLD: 14.6 % — HIGH (ref 10.3–14.5)
SODIUM SERPL-SCNC: 139 MMOL/L — SIGNIFICANT CHANGE UP (ref 135–145)
WBC # BLD: 12.16 K/UL — HIGH (ref 3.8–10.5)
WBC # FLD AUTO: 12.16 K/UL — HIGH (ref 3.8–10.5)

## 2019-02-21 PROCEDURE — 99232 SBSQ HOSP IP/OBS MODERATE 35: CPT | Mod: GC

## 2019-02-21 RX ORDER — COLISTIMETHATE SODIUM 150 MG/2ML
100 INJECTION INTRAMUSCULAR; INTRAVENOUS
Qty: 28 | Refills: 0 | OUTPATIENT
Start: 2019-02-21 | End: 2019-03-06

## 2019-02-21 RX ORDER — COLISTIMETHATE SODIUM 150 MG/2ML
100 INJECTION INTRAMUSCULAR; INTRAVENOUS
Qty: 0 | Refills: 0 | Status: DISCONTINUED | OUTPATIENT
Start: 2019-02-21 | End: 2019-02-22

## 2019-02-21 RX ADMIN — BENZOCAINE AND MENTHOL 1 LOZENGE: 5; 1 LIQUID ORAL at 14:42

## 2019-02-21 RX ADMIN — Medication 50 MILLIGRAM(S): at 11:11

## 2019-02-21 RX ADMIN — SODIUM CHLORIDE 4 MILLILITER(S): 9 INJECTION INTRAMUSCULAR; INTRAVENOUS; SUBCUTANEOUS at 09:41

## 2019-02-21 RX ADMIN — Medication 50 MILLIGRAM(S): at 17:09

## 2019-02-21 RX ADMIN — Medication 50 MILLIGRAM(S): at 00:11

## 2019-02-21 RX ADMIN — ALBUTEROL 2.5 MILLIGRAM(S): 90 AEROSOL, METERED ORAL at 21:15

## 2019-02-21 RX ADMIN — DORNASE ALFA 2.5 MILLIGRAM(S): 1 SOLUTION RESPIRATORY (INHALATION) at 21:19

## 2019-02-21 RX ADMIN — ALBUTEROL 2.5 MILLIGRAM(S): 90 AEROSOL, METERED ORAL at 09:33

## 2019-02-21 RX ADMIN — Medication 40 MILLIGRAM(S): at 06:10

## 2019-02-21 RX ADMIN — Medication 518.75 MILLIGRAM(S): at 14:34

## 2019-02-21 RX ADMIN — Medication 650 MILLIGRAM(S): at 13:42

## 2019-02-21 RX ADMIN — DORNASE ALFA 2.5 MILLIGRAM(S): 1 SOLUTION RESPIRATORY (INHALATION) at 09:48

## 2019-02-21 RX ADMIN — FAMOTIDINE 40 MILLIGRAM(S): 10 INJECTION INTRAVENOUS at 08:46

## 2019-02-21 RX ADMIN — Medication 650 MILLIGRAM(S): at 23:21

## 2019-02-21 RX ADMIN — SODIUM CHLORIDE 4 MILLILITER(S): 9 INJECTION INTRAMUSCULAR; INTRAVENOUS; SUBCUTANEOUS at 21:18

## 2019-02-21 RX ADMIN — COLISTIMETHATE SODIUM 100 MILLIGRAM(S): 150 INJECTION INTRAMUSCULAR; INTRAVENOUS at 15:42

## 2019-02-21 RX ADMIN — Medication 650 MILLIGRAM(S): at 14:12

## 2019-02-21 RX ADMIN — LIDOCAINE 1 PATCH: 4 CREAM TOPICAL at 06:11

## 2019-02-21 RX ADMIN — Medication 50 MILLIGRAM(S): at 23:21

## 2019-02-21 RX ADMIN — ALBUTEROL 2.5 MILLIGRAM(S): 90 AEROSOL, METERED ORAL at 16:40

## 2019-02-21 RX ADMIN — Medication 50 MILLIGRAM(S): at 06:11

## 2019-02-21 NOTE — PROGRESS NOTE ADULT - SUBJECTIVE AND OBJECTIVE BOX
Patient is a 43y old  Female who presents with a chief complaint of Allergic Reaction (20 Feb 2019 13:43)      SUBJECTIVE / OVERNIGHT EVENTS:  Patient seen and examined at bedside. No acute events overnight. the patient has some throat tightness but no difficulties breathing. She now has pain on her left flank in addition to the right, consistent with her infection.     Other Review of Systems Negative.    MEDICATIONS  (STANDING):  ALBUTerol    0.083% 2.5 milliGRAM(s) Nebulizer every 6 hours  ALBUTerol    90 MICROgram(s) HFA Inhaler 1 Puff(s) Inhalation every 4 hours  colistimethate IVPB 100 milliGRAM(s) IV Intermittent two times a day  diphenhydrAMINE 50 milliGRAM(s) Oral every 6 hours  dornase diana Solution 2.5 milliGRAM(s) Inhalation two times a day  lidocaine   Patch 1 Patch Transdermal every 24 hours  sodium chloride 7% Inhalation 4 milliLiter(s) Inhalation two times a day  trimethoprim / sulfamethoxazole IVPB 300 milliGRAM(s) IV Intermittent two times a day    MEDICATIONS  (PRN):  acetaminophen    Suspension .. 650 milliGRAM(s) Oral every 6 hours PRN Mild Pain (1 - 3), Moderate Pain (4 - 6), Severe Pain (7 - 10)  benzocaine 15 mG/menthol 3.6 mG (Sugar-Free) Lozenge 1 Lozenge Oral every 2 hours PRN Sore Throat  EPINEPHrine     1 mG/mL Injectable 0.3 milliGRAM(s) IntraMuscular once PRN SOB/wheezing      OBJECTIVE:    Vital Signs Last 24 Hrs  T(C): 36.7 (21 Feb 2019 12:55), Max: 37.1 (21 Feb 2019 05:35)  T(F): 98.1 (21 Feb 2019 12:55), Max: 98.7 (21 Feb 2019 05:35)  HR: 84 (21 Feb 2019 12:55) (74 - 102)  BP: 132/75 (21 Feb 2019 12:55) (100/82 - 144/84)  BP(mean): 89 (20 Feb 2019 23:00) (81 - 123)  RR: 20 (21 Feb 2019 12:55) (12 - 27)  SpO2: 98% (21 Feb 2019 12:55) (97% - 100%)    CAPILLARY BLOOD GLUCOSE        I&O's Summary    20 Feb 2019 07:01  -  21 Feb 2019 07:00  --------------------------------------------------------  IN: 300 mL / OUT: 2 mL / NET: 298 mL        PHYSICAL EXAM:  GENERAL: NAD, well-developed  HEAD:  Atraumatic, Normocephalic  EYES: EOMI, PERRLA, conjunctiva and sclera clear  NECK: Supple, No JVD  CHEST/LUNG: decreased breath sounds in the right base. No wheeze, no crackles  HEART: Regular rate and rhythm; No murmurs, rubs, or gallops  ABDOMEN: Soft, Nontender, Nondistended; Bowel sounds present  EXTREMITIES:  2+ Peripheral Pulses, No clubbing, cyanosis, or edema  PSYCH: AAOx3  NEUROLOGY: non-focal  SKIN: No rashes or lesions    LABS:                        11.2   12.16 )-----------( 358      ( 21 Feb 2019 06:10 )             36.7     Auto Eosinophil # 0.41  / Auto Eosinophil % 3.4   / Auto Neutrophil # 7.44  / Auto Neutrophil % 61.3  / BANDS % x        02-21    139  |  103  |  12  ----------------------------<  82  3.7   |  26  |  0.71    Ca    8.5      21 Feb 2019 06:10  Mg     2.1     02-21  Phos  3.1     02-21  TPro  6.7  /  Alb  3.7  /  TBili  0.3  /  DBili  x   /  AST  13  /  ALT  13  /  AlkPhos  51  02-21    Care Discussed with Consultants/Other Providers:  Care discussed with pulmonology

## 2019-02-21 NOTE — PROGRESS NOTE ADULT - PROBLEM SELECTOR PLAN 1
Recent globus sensation in throat with difficulty swallowing during antibiotic infusion at home concerning for allergic reaction to Bactrim vs colistin vs imipenem - no active symptoms at this time    - c/w Benadryl 50mg q6H, Pepcid 40mg BID, prednisone 40 qD  - s/p scope by ENT 02/15 - no base of tongue edema, no edema, vocal cords mobile and patent  - s/p graded dose challenge with protocol documented per Allergy and Immunology

## 2019-02-21 NOTE — PROGRESS NOTE ADULT - ASSESSMENT
43F with Cystic Fibrosis who presents with symptoms concerning for allergic reaction to Bactrim, s/p graded dose challenge to Bactrim.

## 2019-02-21 NOTE — PROGRESS NOTE ADULT - PROBLEM SELECTOR PLAN 2
- afebrile without respiratory distress/increased work of breathing on presentation - mucus secretions improved this admission with aggressive chest PT  - albuterol nebulizer q6H, hypersaline 7% BID, Pulmozyme BID, chest vest per Pulm  recs  - sputum culture shows normal respiratory jacquie  - send home on bactrim 300 mg BID and colistin 100 mg BID for 14 day course.

## 2019-02-22 VITALS
SYSTOLIC BLOOD PRESSURE: 146 MMHG | DIASTOLIC BLOOD PRESSURE: 80 MMHG | HEART RATE: 82 BPM | OXYGEN SATURATION: 98 % | RESPIRATION RATE: 19 BRPM | TEMPERATURE: 98 F

## 2019-02-22 LAB — BACTERIA SPT RESP CULT: SIGNIFICANT CHANGE UP

## 2019-02-22 PROCEDURE — 99232 SBSQ HOSP IP/OBS MODERATE 35: CPT

## 2019-02-22 RX ORDER — CLARITHROMYCIN 500 MG
1240 TABLET ORAL
Qty: 0 | Refills: 0 | COMMUNITY

## 2019-02-22 RX ORDER — OMEPRAZOLE 10 MG/1
1 CAPSULE, DELAYED RELEASE ORAL
Qty: 0 | Refills: 0 | COMMUNITY

## 2019-02-22 RX ORDER — PANTOPRAZOLE SODIUM 20 MG/1
1 TABLET, DELAYED RELEASE ORAL
Qty: 14 | Refills: 0 | OUTPATIENT
Start: 2019-02-22 | End: 2019-03-07

## 2019-02-22 RX ORDER — FAMOTIDINE 10 MG/ML
20 INJECTION INTRAVENOUS DAILY
Qty: 0 | Refills: 0 | Status: DISCONTINUED | OUTPATIENT
Start: 2019-02-22 | End: 2019-02-22

## 2019-02-22 RX ORDER — PANTOPRAZOLE SODIUM 20 MG/1
40 TABLET, DELAYED RELEASE ORAL ONCE
Qty: 0 | Refills: 0 | Status: COMPLETED | OUTPATIENT
Start: 2019-02-22 | End: 2019-02-22

## 2019-02-22 RX ORDER — TOBRAMYCIN SULFATE 40 MG/ML
5 VIAL (ML) INJECTION
Qty: 0 | Refills: 0 | COMMUNITY

## 2019-02-22 RX ADMIN — Medication 650 MILLIGRAM(S): at 00:16

## 2019-02-22 RX ADMIN — COLISTIMETHATE SODIUM 100 MILLIGRAM(S): 150 INJECTION INTRAMUSCULAR; INTRAVENOUS at 03:54

## 2019-02-22 RX ADMIN — BENZOCAINE AND MENTHOL 1 LOZENGE: 5; 1 LIQUID ORAL at 13:47

## 2019-02-22 RX ADMIN — SODIUM CHLORIDE 4 MILLILITER(S): 9 INJECTION INTRAMUSCULAR; INTRAVENOUS; SUBCUTANEOUS at 09:44

## 2019-02-22 RX ADMIN — Medication 518.75 MILLIGRAM(S): at 02:28

## 2019-02-22 RX ADMIN — ALBUTEROL 2.5 MILLIGRAM(S): 90 AEROSOL, METERED ORAL at 09:33

## 2019-02-22 RX ADMIN — Medication 50 MILLIGRAM(S): at 12:58

## 2019-02-22 RX ADMIN — PANTOPRAZOLE SODIUM 40 MILLIGRAM(S): 20 TABLET, DELAYED RELEASE ORAL at 15:55

## 2019-02-22 RX ADMIN — COLISTIMETHATE SODIUM 100 MILLIGRAM(S): 150 INJECTION INTRAMUSCULAR; INTRAVENOUS at 12:58

## 2019-02-22 RX ADMIN — FAMOTIDINE 20 MILLIGRAM(S): 10 INJECTION INTRAVENOUS at 13:38

## 2019-02-22 RX ADMIN — Medication 50 MILLIGRAM(S): at 05:57

## 2019-02-22 RX ADMIN — DORNASE ALFA 2.5 MILLIGRAM(S): 1 SOLUTION RESPIRATORY (INHALATION) at 09:52

## 2019-02-22 RX ADMIN — Medication 518.75 MILLIGRAM(S): at 13:38

## 2019-02-22 NOTE — PROGRESS NOTE ADULT - PROBLEM SELECTOR PLAN 2
- afebrile without respiratory distress/increased work of breathing on presentation - mucus secretions improved this admission with aggressive chest PT  - albuterol nebulizer q6H, hypersaline 7% BID, Pulmozyme BID, chest vest per Pulm  recs  - sputum culture shows normal respiratory jacquie  - send home on bactrim 300 mg BID and colistin 100 mg BID for 14 day course ending on 3/6/19

## 2019-02-22 NOTE — PROGRESS NOTE ADULT - SUBJECTIVE AND OBJECTIVE BOX
Patient is a 43y old  Female who presents with a chief complaint of Allergic Reaction (21 Feb 2019 13:02)      SUBJECTIVE / OVERNIGHT EVENTS:  Patient seen and examined at bedside. Patient complaining of some throat discomfort, back pain has resolved. No acute events overnight.     Other Review of Systems Negative.    MEDICATIONS  (STANDING):  ALBUTerol    0.083% 2.5 milliGRAM(s) Nebulizer every 6 hours  ALBUTerol    90 MICROgram(s) HFA Inhaler 1 Puff(s) Inhalation every 4 hours  colistimethate IVPB 100 milliGRAM(s) IV Intermittent two times a day  diphenhydrAMINE 50 milliGRAM(s) Oral every 6 hours  dornase diana Solution 2.5 milliGRAM(s) Inhalation two times a day  lidocaine   Patch 1 Patch Transdermal every 24 hours  sodium chloride 7% Inhalation 4 milliLiter(s) Inhalation two times a day  trimethoprim / sulfamethoxazole IVPB 300 milliGRAM(s) IV Intermittent two times a day    MEDICATIONS  (PRN):  acetaminophen    Suspension .. 650 milliGRAM(s) Oral every 6 hours PRN Mild Pain (1 - 3), Moderate Pain (4 - 6), Severe Pain (7 - 10)  benzocaine 15 mG/menthol 3.6 mG (Sugar-Free) Lozenge 1 Lozenge Oral every 2 hours PRN Sore Throat  EPINEPHrine     1 mG/mL Injectable 0.3 milliGRAM(s) IntraMuscular once PRN SOB/wheezing      OBJECTIVE:    Vital Signs Last 24 Hrs  T(C): 36.8 (22 Feb 2019 05:55), Max: 36.8 (22 Feb 2019 05:55)  T(F): 98.3 (22 Feb 2019 05:55), Max: 98.3 (22 Feb 2019 05:55)  HR: 84 (22 Feb 2019 05:55) (84 - 99)  BP: 118/71 (22 Feb 2019 05:55) (118/71 - 132/75)  BP(mean): --  RR: 18 (22 Feb 2019 05:55) (18 - 20)  SpO2: 98% (22 Feb 2019 05:55) (97% - 100%)      PHYSICAL EXAM:  GENERAL: NAD, well-developed  HEAD:  Atraumatic, Normocephalic  CHEST/LUNG: Clear to auscultation bilaterally; No wheeze  HEART: Regular rate and rhythm; No murmurs, rubs, or gallops  ABDOMEN: Soft, Nontender, Nondistended; Bowel sounds present  EXTREMITIES:  2+ Peripheral Pulses, No clubbing, cyanosis, or edema  PSYCH: AAOx3  NEUROLOGY: non-focal  SKIN: No rashes or lesions    LABS:                        11.2   12.16 )-----------( 358      ( 21 Feb 2019 06:10 )             36.7     Auto Eosinophil # 0.41  / Auto Eosinophil % 3.4   / Auto Neutrophil # 7.44  / Auto Neutrophil % 61.3  / BANDS % x        02-21    139  |  103  |  12  ----------------------------<  82  3.7   |  26  |  0.71    Ca    8.5      21 Feb 2019 06:10  Mg     2.1     02-21  Phos  3.1     02-21  TPro  6.7  /  Alb  3.7  /  TBili  0.3  /  DBili  x   /  AST  13  /  ALT  13  /  AlkPhos  51  02-21    Care Discussed with Consultants/Other Providers:  Care discussed with with pulmonology

## 2019-02-22 NOTE — PROGRESS NOTE ADULT - ATTENDING COMMENTS
CF exacerbation. Hx of Achromobacter and EColi in sputum. S/p graded challenge to IV Bactrim 2/2 suspected allergic reaction. Continue with Bactrim and Colistin.
awaiting bed to start sensitization to bactrim.
Gong: I have seen and examined the patient face to face, have reviewed and addended the HPI, PE and a/p as necessary    43F with Cystic Fibrosis who presents with symptoms concerning for allergic reaction to Bactrim, admitted for graded dose challenge to Bactrim pending MICU transfer. Scoped by ENT with no signs of laryngeal edema.  Stable this AM, reports slight left sided neck swelling over anterior cervical lymph node.    Gen: NAD aao x3 HEENT: No uvula deviation, no tonsillar edema or exudates, tongue is normal size. CARD -s1s2, RRR, no M,G,R; PULM - L sided crackles at the bases; ABD:  +BS, ND, NT, soft, no guarding, no rebound, no masses; BACK: no CVA tenderness, Normal  spine; EXT: symmetric pulses, 2+ dp, capillary refill < 2 seconds, no clubbing, no cyanosis, no edema   43F cystic fibrosis recently started bactrim infusion noted to have possible allergic reaction, stopped and is now awaiting graded dose bactrim challenge.    - awaiting ICU bed and f/u allergy immunology recs  - albuterol nebulizer q6H, hypersaline 7% BID, Pulmozyme BID, chest vest per Pulmonary recommendations and c/w home Advair

## 2019-03-02 ENCOUNTER — TRANSCRIPTION ENCOUNTER (OUTPATIENT)
Age: 44
End: 2019-03-02

## 2019-03-07 ENCOUNTER — APPOINTMENT (OUTPATIENT)
Dept: PULMONOLOGY | Facility: CLINIC | Age: 44
End: 2019-03-07
Payer: COMMERCIAL

## 2019-03-07 ENCOUNTER — LABORATORY RESULT (OUTPATIENT)
Age: 44
End: 2019-03-07

## 2019-03-07 VITALS
RESPIRATION RATE: 16 BRPM | HEIGHT: 63.5 IN | HEART RATE: 88 BPM | WEIGHT: 171 LBS | DIASTOLIC BLOOD PRESSURE: 80 MMHG | BODY MASS INDEX: 29.92 KG/M2 | SYSTOLIC BLOOD PRESSURE: 124 MMHG | TEMPERATURE: 97.9 F | OXYGEN SATURATION: 96 %

## 2019-03-07 PROCEDURE — 99215 OFFICE O/P EST HI 40 MIN: CPT | Mod: 25

## 2019-03-07 PROCEDURE — ZZZZZ: CPT

## 2019-03-07 PROCEDURE — 99354: CPT

## 2019-03-07 PROCEDURE — 94010 BREATHING CAPACITY TEST: CPT

## 2019-03-07 RX ORDER — PREDNISONE 20 MG/1
20 TABLET ORAL DAILY
Qty: 7 | Refills: 0 | Status: DISCONTINUED | COMMUNITY
Start: 2019-02-05 | End: 2019-03-07

## 2019-03-08 NOTE — HISTORY OF PRESENT ILLNESS
[3  -  Moderate] : 3, moderate [MSSA] : MSSA [Other: ___] : [unfilled] [___] : the last positive MSSA result was [unfilled] [Last AFB Date: ___] : the AFB was performed  [unfilled] [Daily] : daily cough reported [< 1/4 cup] : less than 1/4 cup of [None] : ~He/She~ has no hemoptysis [FVC ___] : the FVC was [unfilled] liters [FEV1: ___] : the FEV1 was [unfilled] liters [] : gilberto suárez [Good] : good [Headache] : headache [Congestion] : nasal congestion [Pancreatic Insufficiency] : pancreatic insufficiency [Pancreatic Enzyme Supp.] : uses pancreatic enzyme supplements [Osteopenia] : osteopenia [HgA1C Value: ___] : HgA1C value was [unfilled]  [Date: ___] : on [unfilled] [Normal] : OGTT results were normal [AFB] : the patient had a MAC negative AFB [Sinus Pain] : no sinus pain [Pancreatitis] : no pancreatitis [Diarrhea] : no diarrhea [Constipation] : no constipation [Steatorrhea] : no steatorrhea [CFRD] : no CFRD [de-identified] : This is a 44 y/o female CF pt who was dx'ed CF at 23 y/o, Pt's younger sister was dx'ed CF at birth. Pt was sweat tested at age 5 and family was told she has false positive sweat test then. Pt grew up having frequent pneumonia, episodes of hemoptysis and recurrent sinus infection. 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 Sweat test on 10/12/98 was 65.3. Pt never had stool Pancrease testing and she does not have s/s of PI. + chronic sinusitis and 2 sinus surgeries, last was 2012.\par Been on Kalydeco since 2014 and reports increased energy level, decreased cough, decreased exacerbation and frequency; prior to Kalydeco, was using IV antibiotics and having exacerbations every 2-3 months along with sinus infections.\par Post Kalydeco, she gets 1-2 exacerbations needed PO antibiotics.\par On Symdeko 4/2018 doing well\par \par \par Pt has an allergy to chlorahexadine during exacerbation in December, going forward can only use iodine for PICC dressing changes.\par \par Here today for f/u visit.  Currently being treated for exacerbation:\par 2/9 started on IV Coli 150 mg and Bactrim\par 2/11 pt reported tingling in finger tips, Coli dose lowered to 100 mg\par 2/13 pt reported s/s of bactrim allergy.  Bactrim stopped and pt changed to Imipenem\par 2/14 pt admitted to [facility] for desensitization for bactrim\par did NOT receive IV antibiotics 2/14 to 2/20\par 2/20 pt desensitized to bactrim, tolerated doses well\par 2/22 pt d/c'ed home\par 2/23 pt restarted IV Colistin 100 mg (10 days today) and continued IV Bactrim (2 weeks today)\par \par Today, pt is reporting some improvement in symptoms however not at baseline.  Continues to endorse a dull aching pain to right lower back, 5/10 pain consistent, subsides with tylenol and heat but never fully resolves.  Pt describes pain as it "feels like something is sitting there".  Not reproducible with palpation.  \par CARDENAS continues to be increased.  \par Cough is still above baseline with increased yellow sputum\par + sinus HA and PND\par \par denies fever/wheezing/tightness/GI complaints, occasional constipation that resolves with prunes\par \par ACT: Al/HS/Pulm BID\par Started using HS 7% and is tolerating well.  \par [place] vest and using it 30 minutes BID\par Using Aerobika and ACT called [facility]-cornet multiple times a day\par Completed Alonso  OFF cylce  /OFF Colistin\par \par ENT: increased sinus symptoms, HA and sinus congestion.  Taking Excedrin or tylenol as needed and Dymista as ordered.\par \par Transplant: f/u with [facility] on 5/1\par  [de-identified] : Fasting 86 2hr 86 [FreeTextEntry1] : \par

## 2019-03-08 NOTE — END OF VISIT
[FreeTextEntry3] : I agree with the nurse practitioners history, physical examination and plan of care. I personally elicited a history and examined the patient\par time spent 1-2:10 pm\par  [>50% of Time Spent on Counseling and Coordination of Care for  ___] : Greater than 50% of the encounter time was spent on counseling and coordination of care for [unfilled] [Time Spent: ___ minutes] : I have spent [unfilled] minutes of face to face time with the patient

## 2019-03-08 NOTE — REVIEW OF SYSTEMS
[Feeling Poorly] : feeling poorly [Feeling Tired] : feeling tired [Cough] : cough [SOB on Exertion] : shortness of breath during exertion [see HPI] : see HPI [Negative] : Heme/Lymph [PND] : PND [Fever] : no fever [Chills] : no chills [Nasal Discharge] : no nasal discharge [Shortness Of Breath] : no shortness of breath [Wheezing] : no wheezing

## 2019-03-08 NOTE — PHYSICAL EXAM
[General Appearance - Well Developed] : well developed [Normal Appearance] : normal appearance [No Deformities] : no deformities [General Appearance - In No Acute Distress] : no acute distress [Normal Conjunctiva] : the conjunctiva exhibited no abnormalities [Eyelids - No Xanthelasma] : the eyelids demonstrated no xanthelasmas [Neck Appearance] : the appearance of the neck was normal [Neck Cervical Mass (___cm)] : no neck mass was observed [Jugular Venous Distention Increased] : there was no jugular-venous distention [Thyroid Diffuse Enlargement] : the thyroid was not enlarged [Thyroid Nodule] : there were no palpable thyroid nodules [Heart Rate And Rhythm] : heart rate was normal and rhythm regular [Heart Sounds] : normal S1 and S2 [Heart Sounds Gallop] : no gallops [Murmurs] : no murmurs [Heart Sounds Pericardial Friction Rub] : no pericardial rub [Respiration, Rhythm And Depth] : normal respiratory rhythm and effort [Exaggerated Use Of Accessory Muscles For Inspiration] : no accessory muscle use [Bowel Sounds] : normal bowel sounds [Abdomen Soft] : soft [Abdomen Tenderness] : non-tender [Abdomen Mass (___ Cm)] : no abdominal mass palpated [Abnormal Walk] : normal gait [Musculoskeletal - Swelling] : no joint swelling seen [Motor Tone] : muscle strength and tone were normal [Cyanosis, Localized] : no localized cyanosis [Petechial Hemorrhages (___cm)] : no petechial hemorrhages [Skin Color & Pigmentation] : normal skin color and pigmentation [Skin Turgor] : normal skin turgor [] : no rash [Sensation] : the sensory exam was normal to light touch and pinprick [No Focal Deficits] : no focal deficits [Oriented To Time, Place, And Person] : oriented to person, place, and time [Affect] : the affect was normal [PICC] : PICC [Arm] : arm [Right] : right [Single] : it is a single lumen catheter [Redness] : no redness [Swelling] : no swelling [Tenderness] : no tenderness [Discharge] : no discharge [Excoriation] : no excoriation of surrounding skin [FreeTextEntry1] : digital clubbing

## 2019-03-08 NOTE — ASSESSMENT
[FreeTextEntry1] : Patient is a 42 y/o female CF pt who was dx'ed CF at 23 y/o, Pt's younger sister was dx'ed CF at birth. Pt was sweat tested at age 5 and family was told she has false positive sweat test then. Pt grew up having frequent pneumonia, episodes of hemoptysis and recurrent sinus infection. 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 Sweat test on 10/12/98 was 65.3. \par \par Here today for f/u visit.  Currently being treated for exacerbation:\par 2/9 started on IV Coli 150 mg and Bactrim\par 2/11 pt reported tingling in finger tips, Coli dose lowered to 100 mg\par 2/13 pt reported s/s of bactrim allergy.  Bactrim stopped and pt changed to Imipenem\par 2/14 pt admitted to [facility] for desensitization for bactrim\par did NOT receive IV antibiotics 2/14 to 2/20\par 2/20 pt desensitized to bactrim, tolerated doses well\par 2/22 pt d/c'ed home\par 2/23 pt restarted IV Colistin 100 mg (10 days today) and continued IV Bactrim (2 weeks today)\par \par Here today for f/u visit. Pt is not at baseline.  Continues to endorse a dull aching pain to right lower back, 5/10 pain consistent, subsides with tylenol and heat but never fully resolves.  Pt describes pain as it "feels like something is sitting there".  Not reproducible with palpation.  \par CARDENAS continues to be increased as well as cough and sputum.  \par \par FEV1 today 27%----->FeV1 31% (8/2018), FEV1 29% 3/27/18, lower than baseline. FEV1 has been 32 - 33% in 2016; 39% in 2015. 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 coli. \par \par Sputum history: Achromobacter, MSSA\par - PICC C/D/I\par - IV orders as follows:\par 1) Continue IV Colistin 100 mg Q 12 hours\par 2) Continue IV Bactrim 300 mg\par 3) START Cipro  mg Q 8 hours, will follow allergy recommendations as listed below \par - overnight oximetry: scheduled for the end of March\par - f/u with Dr. Kathleen\par \par ALLERGY - pt had skin testing and is NOT allergic to CIPRO/LEVAQUIN, CLINDAMYCIN. \par PER Allergy:\par There is still a risk of delayed reactions with these antibiotics. If treatment with any of these antibiotics is indicated and no adequate alternatives are available, can start antibiotics, along with daily nonsedating oral antihistamines such as cetirizine and Montelukast. Continue cetirizine and Montelukast for 5 days after antibiotic is completed. \par \par Social: pt has not returned to work since last exacerbation.  Unsure if she will return due to disease progression/severity.  Discussing with  [place] option of disability and insurance coverage.  Referred to CF legal to help with this process.  Provided emotional support as she processes these changes.\par \par LUNG TRANSPLANT - next appt scheduled for 5/1\par \par Finished on cycle today of HORTENSIA (gives horse voice , resolves when she doesn't use it, asked pt to gargle and spit after nebs), alternating with inhaled COLISTIN \par ACT adherence is improved, using [place] VEST BID and Aerobika multiple times per day\par Now using HS 7% and tolerating well, continue with Alb/HS/Pulmozyme BID.\par Continue Advair.\par \par MODULATOR - Tolerating symdeko for 4 months now, started in 4/2018. F/U LFTS\par \par ENT - chronic sinusitis - had followup with ENT. Sinuses clear, no need for surgery.\par PND - Continue Dymista, likes more than flonase\par \par Endocrine - OGTT - normal, this year repeat annually.\par Bone density demonstrated osteopenia, Vitamin D deficiency - patient on vitamin D 4000 IU. Not on Calcium recommended to take OTC MVI\par \par Heme - anemia. Hbg 8.9 from 11 IN 2016 last year. Started Niferix, repeat blood work improved hgb WNL\par \par GI - Still needs colonoscopy. Continue pantoprazole for GERD. DC pepcid. \par \par Advanced care/GOC discussion - d/w pt regarding risks and benefits of lung transplant, and ventilator.\par transplant work up after IV course:\par \par complete PFT\par ABG\par Janes V/Q\par ECG\par Echo with bubble study\par RHC\par bone densiity\par barium esophogram\par sniff test, fluorscopy\par f/u office visit on 3/21 at 12 pm \par

## 2019-03-10 ENCOUNTER — MOBILE ON CALL (OUTPATIENT)
Age: 44
End: 2019-03-10

## 2019-03-11 ENCOUNTER — OTHER (OUTPATIENT)
Age: 44
End: 2019-03-11

## 2019-03-11 ENCOUNTER — CHART COPY (OUTPATIENT)
Age: 44
End: 2019-03-11

## 2019-03-15 ENCOUNTER — RESULT REVIEW (OUTPATIENT)
Age: 44
End: 2019-03-15

## 2019-03-21 ENCOUNTER — APPOINTMENT (OUTPATIENT)
Dept: PULMONOLOGY | Facility: CLINIC | Age: 44
End: 2019-03-21
Payer: COMMERCIAL

## 2019-03-21 VITALS — BODY MASS INDEX: 30.51 KG/M2 | WEIGHT: 175 LBS

## 2019-03-21 DIAGNOSIS — Z71.89 OTHER SPECIFIED COUNSELING: ICD-10-CM

## 2019-03-21 PROCEDURE — 94010 BREATHING CAPACITY TEST: CPT

## 2019-03-21 PROCEDURE — ZZZZZ: CPT

## 2019-03-21 PROCEDURE — 99354: CPT

## 2019-03-21 PROCEDURE — 99215 OFFICE O/P EST HI 40 MIN: CPT | Mod: 25

## 2019-03-22 VITALS
WEIGHT: 175 LBS | TEMPERATURE: 97.6 F | BODY MASS INDEX: 30.62 KG/M2 | SYSTOLIC BLOOD PRESSURE: 120 MMHG | HEART RATE: 86 BPM | OXYGEN SATURATION: 95 % | HEIGHT: 63.5 IN | RESPIRATION RATE: 18 BRPM | DIASTOLIC BLOOD PRESSURE: 76 MMHG

## 2019-03-25 NOTE — END OF VISIT
[>50% of Time Spent on Counseling and Coordination of Care for  ___] : Greater than 50% of the encounter time was spent on counseling and coordination of care for [unfilled] [Time Spent: ___ minutes] : I have spent [unfilled] minutes of face to face time with the patient [FreeTextEntry3] : I agree with the nurse practitioners history, physical examination and plan of care. I personally elicited a history and examined the patient\par time spent 1-2:10 pm\par

## 2019-03-25 NOTE — REVIEW OF SYSTEMS
[Feeling Poorly] : feeling poorly [Cough] : cough [PND] : PND [SOB on Exertion] : shortness of breath during exertion [see HPI] : see HPI [Negative] : Heme/Lymph [Shortness Of Breath] : shortness of breath [Wheezing] : wheezing [Fever] : no fever [Chills] : no chills [Feeling Tired] : not feeling tired [Nasal Discharge] : no nasal discharge

## 2019-03-25 NOTE — ASSESSMENT
[FreeTextEntry1] : Patient is a 42 y/o female CF pt who was dx'ed CF at 23 y/o, Pt's younger sister was dx'ed CF at birth. Pt was sweat tested at age 5 and family was told she has false positive sweat test then. Pt grew up having frequent pneumonia, episodes of hemoptysis and recurrent sinus infection. 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 Sweat test on 10/12/98 was 65.3. \par \par Here today for f/u visit.  Currently being treated for exacerbation:\par 2/9 started on IV Coli 150 mg and Bactrim\par 2/11 pt reported tingling in finger tips, Coli dose lowered to 100 mg\par 2/13 pt reported s/s of bactrim allergy.  Bactrim stopped and pt changed to Imipenem\par 2/14 pt admitted to [facility] for desensitization for bactrim\par did NOT receive IV antibiotics 2/14 to 2/20\par 2/20 pt desensitized to bactrim, tolerated doses well\par 2/22 pt d/c'ed home\par 2/23 pt restarted IV Colistin 100 mg and continued IV Bactrim\par 3/10- contacted on call c/o numbness tingling of hands and feet with combination of Colistin and Cipro. Instructed to hold doses.\par 3/11-restart Cipro and Bactrim only d/c Colistin\par 3/21- currently on 4 weeks of Bactrim, 14 days Cipro\par \par Here today for f/u visit. Pt is not at baseline.  Continues to endorse a dull aching pain to right lower back, 5/10 pain consistent, subsides with tylenol and heat but never fully resolves.  Pt describes pain as it "feels like something is sitting there".  Not reproducible with palpation.  \par CARDENAS continues to be increased as well as cough and sputum.  \par \par \par FEV1 Not improved today remains low 28% from (3/7/19) 27%----->FeV1 31% (8/2018), FEV1 29% 3/27/18, lower than baseline. FEV1 has been 32 - 33% in 2016; 39% in 2015. 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 coli. \par \par Sputum history: Achromobacter, MSSA\par - PICC C/D/I\par - R/n Cipro and Bactrim for an additional 7 days - addenum 3/22 - PT complained of SOB with infusion of IV bactrim, no signs of allergic reaction - STOPPED BACTRIM - s/p 4 weeks total.\par continue Cipro.\par -Start Prednisone 10mg daily for 7 days, then 7.5mg daily for 7 days and f/u with spirometry\par -Repeat spirometry next week and evaluate response \par - overnight oximetry: scheduled for the end of March\par - f/u with Dr. Kathleen - WOULD LIKE TO START ON AVYCAZ (CEFTAZIDIME/AVIBACTAM) - I D/W PT AT LENGTH, there is about 54% in Vitro data that it is effective against pan-r achromobacter - she understands that it may or may not be effective but she would like to try. Will see if she needs graded challenge or not with avycaz. \par -If not improved will discuss with Dr. Kathleen possibility of Ceftaz IV for further management\par -Will resubmit for inhaled colistin to be started for maintainence \par - RVP today negative\par - CANNOT USE TIW AZITHROMYCIN AS ANTIINFLAMMATORY GIVEN ALL HER ALLERGY HX, WILL LIKELY NEED DAILY LOW DOSE PREDNISONE. OSTEOPENIA - START CALCIUM CITRATE. ON VITAMIN D. CONSIDER BISPHOSPHONATE. \par \par ALLERGY - pt had skin testing and is NOT allergic to CIPRO/LEVAQUIN, CLINDAMYCIN. \par PER Allergy:\par There is still a risk of delayed reactions with these antibiotics. If treatment with any of these antibiotics is indicated and no adequate alternatives are available, can start antibiotics, along with daily nonsedating oral antihistamines such as cetirizine and Montelukast. Continue cetirizine and Montelukast for 5 days after antibiotic is completed. \par \par Social: pt has not returned to work since last exacerbation.  Unsure if she will return due to disease progression/severity.  Discussing with  [place] option of disability and insurance coverage.  Referred to CF legal to help with this process.  Provided emotional support as she processes these changes.\par \par LUNG TRANSPLANT - next appt scheduled for 5/1\par \par Finished on cycle today of HORTENSIA (gives horse voice , resolves when she doesn't use it, asked pt to gargle and spit after nebs), alternating with inhaled COLISTIN \par ACT adherence is improved, using [place] VEST BID and Aerobika multiple times per day\par Now using HS 7% and tolerating well, continue with Alb/HS/Pulmozyme BID.\par Continue Advair.\par \par MODULATOR - Tolerating symdeko for 4 months now, started in 4/2018. F/U LFTS\par \par ENT - chronic sinusitis - had followup with ENT. Sinuses clear, no need for surgery.\par PND - Continue Dymista, likes more than flonase\par \par Endocrine - OGTT - normal, this year repeat annually.\par Bone density demonstrated osteopenia, Vitamin D deficiency - patient on vitamin D 4000 IU. Not on Calcium recommended to take OTC MVI\par \par Heme - anemia. Hbg 8.9 from 11 IN 2016 last year. Started Niferix, repeat blood work improved hgb WNL\par \par GI - Still needs colonoscopy. Continue pantoprazole for GERD. DC pepcid. \par \par Advanced care/GOC discussion - ONGOING WITH USE OF DECISION AID BEING PILOTED IN OUR CENTER. d/w pt regarding risks and benefits of lung transplant, and ventilator.  participated as well. \par transplant work up after IV course:\par \par complete PFT\par ABG\par Janes V/Q\par ECG\par Echo with bubble study\par RHC\par bone densiity\par barium esophogram\par sniff test, fluorscopy\par

## 2019-03-25 NOTE — PHYSICAL EXAM
[Normal Appearance] : normal appearance [General Appearance - Well Developed] : well developed [General Appearance - In No Acute Distress] : no acute distress [Normal Conjunctiva] : the conjunctiva exhibited no abnormalities [No Deformities] : no deformities [Eyelids - No Xanthelasma] : the eyelids demonstrated no xanthelasmas [Neck Appearance] : the appearance of the neck was normal [Neck Cervical Mass (___cm)] : no neck mass was observed [Jugular Venous Distention Increased] : there was no jugular-venous distention [Thyroid Diffuse Enlargement] : the thyroid was not enlarged [Thyroid Nodule] : there were no palpable thyroid nodules [Heart Rate And Rhythm] : heart rate was normal and rhythm regular [Heart Sounds] : normal S1 and S2 [Heart Sounds Pericardial Friction Rub] : no pericardial rub [Murmurs] : no murmurs [Heart Sounds Gallop] : no gallops [Respiration, Rhythm And Depth] : normal respiratory rhythm and effort [Exaggerated Use Of Accessory Muscles For Inspiration] : no accessory muscle use [Bowel Sounds] : normal bowel sounds [Abdomen Soft] : soft [Abdomen Tenderness] : non-tender [Abdomen Mass (___ Cm)] : no abdominal mass palpated [Abnormal Walk] : normal gait [Motor Tone] : muscle strength and tone were normal [Musculoskeletal - Swelling] : no joint swelling seen [PICC] : PICC [Arm] : arm [Single] : it is a single lumen catheter [Right] : right [Cyanosis, Localized] : no localized cyanosis [Petechial Hemorrhages (___cm)] : no petechial hemorrhages [Skin Color & Pigmentation] : normal skin color and pigmentation [Skin Turgor] : normal skin turgor [] : no rash [Sensation] : the sensory exam was normal to light touch and pinprick [Oriented To Time, Place, And Person] : oriented to person, place, and time [No Focal Deficits] : no focal deficits [Affect] : the affect was normal [Redness] : no redness [Swelling] : no swelling [Tenderness] : no tenderness [Discharge] : no discharge [Excoriation] : no excoriation of surrounding skin [FreeTextEntry1] : digital clubbing

## 2019-03-25 NOTE — HISTORY OF PRESENT ILLNESS
[3  -  Moderate] : 3, moderate [Other: ___] : [unfilled] [MSSA] : MSSA [___] : the last positive MSSA result was [unfilled] [< 1/4 cup] : less than 1/4 cup of [Last AFB Date: ___] : the AFB was performed  [unfilled] [Daily] : daily cough reported [None] : ~He/She~ has no hemoptysis [FVC ___] : the FVC was [unfilled] liters [FEV1: ___] : the FEV1 was [unfilled] liters [] : izabella suárez [Good] : good [Osteopenia] : osteopenia [Date: ___] : on [unfilled] [HgA1C Value: ___] : HgA1C value was [unfilled]  [Normal] : OGTT results were normal [Sinus Pain] : sinus pain [AFB] : the patient had a MAC negative AFB [Pancreatitis] : no pancreatitis [Diarrhea] : no diarrhea [Constipation] : no constipation [Steatorrhea] : no steatorrhea [CFRD] : no CFRD [de-identified] : This is a 44 y/o female CF pt who was dx'ed CF at 21 y/o, Pt's younger sister was dx'ed CF at birth. Pt was sweat tested at age 5 and family was told she has false positive sweat test then. Pt grew up having frequent pneumonia, episodes of hemoptysis and recurrent sinus infection. 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 Sweat test on 10/12/98 was 65.3. Pt never had stool Pancrease testing and she does not have s/s of PI. + chronic sinusitis and 2 sinus surgeries, last was 2012.\par Been on Kalydeco since 2014 and reports increased energy level, decreased cough, decreased exacerbation and frequency; prior to Kalydeco, was using IV antibiotics and having exacerbations every 2-3 months along with sinus infections.\par Post Kalydeco, she gets 1-2 exacerbations needed PO antibiotics.\par On Symdeko 4/2018 tolerating w/o adverse effects\par \par Pt has an allergy to chlorahexadine during exacerbation in December, going forward can only use iodine for PICC dressing changes.\par \par Here today for f/u visit.  Currently being treated for exacerbation\par 2/9 started on IV Coli 150 mg and Bactrim\par 2/11 pt reported tingling in finger tips, Coli dose lowered to 100 mg\par 2/13 pt reported s/s of bactrim allergy.  Bactrim stopped and pt changed to Imipenem\par 2/14 pt admitted to [facility] for desensitization for bactrim\par did NOT receive IV antibiotics 2/14 to 2/20\par 2/20 pt desensitized to bactrim, tolerated doses well\par 2/22 pt d/c'ed home\par 2/23 pt restarted IV Colistin 100 m, and continued IV Bactrim \par 3/10- contacted on call c/o numbness tingling of hands and feet with combination of Colistin and Cipro. Instructed to hold doses.\par 3/11-restart Cipro and Bactrim only d/c Colistin\par 3/21- currently on 4 weeks of Bactrim, 14 days Cipro\par \par Today, pt is reporting some improvement in symptoms however not at baseline.  Continues to endorse a dull aching pain to right lower back, 5/10 pain consistent, subsides with tylenol and heat but never fully resolves.  Pt describes pain as it "feels like something is sitting there".  Not reproducible with palpation.  \par CARDENAS continues to be increased.  \par Cough is still above baseline with increased yellow sputum\par Denies sinus HA or PND\par \par denies fever/wheezing/tightness/GI complaints, occasional constipation that resolves with prunes\par \par ACT: Al/HS/Pulm BID\par Started using HS 7% and is tolerating well.  \par [place] vest and using it 30 minutes BID\par Using Aerobika and ACT called RC-cornet multiple times a day\par Completed Alonso  OFF cylce  /OFF Colistin\par \par ENT: denies increased sinus symptoms currently.\par \par Transplant: f/u with [facility] on 5/1\par  [de-identified] : Fasting 86 2hr 86 [FreeTextEntry1] : \par

## 2019-03-27 LAB
BACTERIA SPT CF RESP CULT: ABNORMAL
RAPID RVP RESULT: NOT DETECTED

## 2019-03-28 ENCOUNTER — APPOINTMENT (OUTPATIENT)
Dept: PULMONOLOGY | Facility: CLINIC | Age: 44
End: 2019-03-28
Payer: COMMERCIAL

## 2019-03-28 VITALS
SYSTOLIC BLOOD PRESSURE: 117 MMHG | BODY MASS INDEX: 30.62 KG/M2 | OXYGEN SATURATION: 98 % | RESPIRATION RATE: 18 BRPM | HEART RATE: 89 BPM | HEIGHT: 63.5 IN | WEIGHT: 175 LBS | DIASTOLIC BLOOD PRESSURE: 78 MMHG | TEMPERATURE: 98.1 F

## 2019-03-28 LAB — ACID FAST STN SPT: NORMAL

## 2019-03-28 PROCEDURE — ZZZZZ: CPT

## 2019-03-28 PROCEDURE — 99215 OFFICE O/P EST HI 40 MIN: CPT | Mod: 25

## 2019-03-28 PROCEDURE — 94010 BREATHING CAPACITY TEST: CPT

## 2019-03-28 RX ORDER — ALBUTEROL SULFATE 90 UG/1
108 (90 BASE) AEROSOL, METERED RESPIRATORY (INHALATION)
Qty: 1 | Refills: 5 | Status: DISCONTINUED | COMMUNITY
Start: 2017-12-10 | End: 2019-03-28

## 2019-03-28 NOTE — HISTORY OF PRESENT ILLNESS
[3  -  Moderate] : 3, moderate [MSSA] : MSSA [Other: ___] : [unfilled] [___] : the last positive MSSA result was [unfilled] [Last AFB Date: ___] : the AFB was performed  [unfilled] [Daily] : daily cough reported [< 1/4 cup] : less than 1/4 cup of [None] : ~He/She~ has no hemoptysis [FVC ___] : the FVC was [unfilled] liters [FEV1: ___] : the FEV1 was [unfilled] liters [] : gilberto suárez [Good] : good [Sinus Pain] : sinus pain [Osteopenia] : osteopenia [HgA1C Value: ___] : HgA1C value was [unfilled]  [Date: ___] : on [unfilled] [Normal] : OGTT results were normal [PICC] : a PICC line [AFB] : the patient had a MAC negative AFB [Pancreatitis] : no pancreatitis [Diarrhea] : no diarrhea [Constipation] : no constipation [Steatorrhea] : no steatorrhea [CFRD] : no CFRD [de-identified] : e [de-identified] : This is a 42 y/o female CF pt who was dx'ed CF at 21 y/o, Pt's younger sister was dx'ed CF at birth. Pt was sweat tested at age 5 and family was told she has false positive sweat test then. Pt grew up having frequent pneumonia, episodes of hemoptysis and recurrent sinus infection. 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 Sweat test on 10/12/98 was 65.3. Pt never had stool Pancrease testing and she does not have s/s of PI. + chronic sinusitis and 2 sinus surgeries, last was 2012.\par Been on Kalydeco since 2014 and reports increased energy level, decreased cough, decreased exacerbation and frequency; prior to Kalydeco, was using IV antibiotics and having exacerbations every 2-3 months along with sinus infections.\par Post Kalydeco, she gets 1-2 exacerbations needed PO antibiotics.\par On Symdeko 4/2018 tolerating w/o adverse effects\par \par Pt has an allergy to chlorahexadine during exacerbation in December, going forward can only use iodine for PICC dressing changes, and sorba view dressing\par \par Here today for f/u visit.  Currently being treated for exacerbation\par 2/9 started on IV Coli 150 mg and Bactrim\par 2/11 pt reported tingling in finger tips, Coli dose lowered to 100 mg\par 2/13 pt reported s/s of bactrim allergy.  Bactrim stopped and pt changed to Imipenem\par 2/14 pt admitted to [facility] for desensitization for bactrim\par did NOT receive IV antibiotics 2/14 to 2/20\par 2/20 pt desensitized to bactrim, tolerated doses well\par 2/22 pt d/c'ed home\par 2/23 pt restarted IV Colistin 100 m, and continued IV Bactrim \par 3/10- contacted on call c/o numbness tingling of hands and feet with combination of Colistin and Cipro. Instructed to hold doses.\par 3/11-restart Cipro and Bactrim only d/c Colistin\par 3/21- currently on 4 weeks of Bactrim, 14 days Cipro\par 3/22- contacted c/o SOB with bactrim infusion. D/c Bactrim. Continue Cipro IV for 21 days\par \par Today, pt is reporting some improvement in symptoms however not at baseline. Is currently on Prednisone 10mg daily and attributes improvement in CARDENAS due to steroid. Still endorsing chest tightness primarily anteriorly under breast bone. \par CARDENAS still remains increased from baseline. \par Cough is still above baseline with productive yellow sputum\par Denies sinus HA or PND\par \par denies fever/wheezing/tightness/GI complaints, occasional constipation that resolves with prunes\par \par ACT: Al/HS/Pulm BID\par Started using HS 7% and is tolerating well.  \par [place] vest and using it 30 minutes BID\par Using Aerobika and ACT called RC-cornet multiple times a day\par Completed Alonso  OFF cylce  /OFF Colistin\par \par ENT: denies increased sinus symptoms currently.\par \par Transplant: f/u with [facility] on 5/1\par  [de-identified] : Fasting 86 2hr 86 [FreeTextEntry1] : \par

## 2019-03-28 NOTE — ASSESSMENT
[FreeTextEntry1] : Patient is a 44 y/o female CF pt who was dx'ed CF at 21 y/o, Pt's younger sister was dx'ed CF at birth. Pt was sweat tested at age 5 and family was told she has false positive sweat test then. Pt grew up having frequent pneumonia, episodes of hemoptysis and recurrent sinus infection. 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 Sweat test on 10/12/98 was 65.3. \par \par Here today for f/u visit.  Currently being treated for exacerbation and not improving.\par \par WILL ARRANGE FOR DIRECT ADMISSION TO [address] [place] [address] TOMORROW FOR IV INFUSION AVYCAZ. WILL CONSULT ID AND ALLERGY WHILE INPATIENT. \par WILL ARRANGE TO COMPLETE OUTSTANDING TESTING FOR TRANSPLANT EVALUATION, AS WELL AS NOCTURNAL PULSE OXIMETRY TO DETERMINE NEED FOR OXYGEN.\par \par 2/9 started on IV Coli 150 mg and Bactrim\par 2/11 pt reported tingling in finger tips, Coli dose lowered to 100 mg\par 2/13 pt reported s/s of bactrim allergy.  Bactrim stopped and pt changed to Imipenem\par 2/14 pt admitted to [facility] for desensitization for bactrim\par did NOT receive IV antibiotics 2/14 to 2/20\par 2/20 pt desensitized to bactrim, tolerated doses well\par 2/22 pt d/c'ed home\par 2/23 pt restarted IV Colistin 100 mg and continued IV Bactrim\par 3/10- contacted on call c/o numbness tingling of hands and feet with combination of Colistin and Cipro. Instructed to hold doses.\par 3/11-restart Cipro and Bactrim only d/c Colistin\par 3/21- currently on 4 weeks of Bactrim, 14 days Cipro\par 3/22-endorsing SOB with bactrim infusion. D/C Bactrim Continue Cipro for 21 days.\par \par \par FEV1 Not improved today 26% remains low last week 28% from (3/7/19) 27%----->FeV1 31% (8/2018), FEV1 29% 3/27/18, lower than baseline. FEV1 has been 32 - 33% in 2016; 39% in 2015. 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 coli. \par \par Sputum history: Achromobacter, MSSA\par - PICC C/D/I\par - 3/22 - PT complained of SOB with infusion of IV bactrim, no signs of allergic reaction - STOPPED BACTRIM - s/p 4 weeks total. .\par -Increase Prednisone to 20mg daily \par \par - f/u with Dr. Kathleen - WOULD LIKE TO START ON AVYCAZ (CEFTAZIDIME/AVIBACTAM) - I D/W PT AT LENGTH, there is about 54% in Vitro data that it is effective against pan-r achromobacter - she understands that it may or may not be effective but she would like to try. Will see if she needs graded challenge or not with avycaz. \par -If not improved will discuss with Dr. Kathleen possibility of Ceftaz IV for further management\par -Will resubmit for inhaled colistin to be started for maintenance \par - RVP today negative\par - CANNOT USE TIW AZITHROMYCIN AS ANTIINFLAMMATORY GIVEN ALL HER ALLERGY HX, WILL LIKELY NEED DAILY LOW DOSE PREDNISONE. OSTEOPENIA - START CALCIUM CITRATE. ON VITAMIN D. CONSIDER BISPHOSPHONATE. \par \par ALLERGY - pt had skin testing and is NOT allergic to CIPRO/LEVAQUIN, CLINDAMYCIN. \par PER Allergy:\par There is still a risk of delayed reactions with these antibiotics. If treatment with any of these antibiotics is indicated and no adequate alternatives are available, can start antibiotics, along with daily nonsedating oral antihistamines such as cetirizine and Montelukast. Continue cetirizine and Montelukast for 5 days after antibiotic is completed. \par \par Social: pt has not returned to work since last exacerbation.  Unsure if she will return due to disease progression/severity.  Discussing with  [place] option of disability and insurance coverage.  Referred to CF legal to help with this process.  Provided emotional support as she processes these changes.\par \par LUNG TRANSPLANT - next appt scheduled for 5/1\par \par Finished on cycle today of HORTENSIA (gives horse voice , resolves when she doesn't use it, asked pt to gargle and spit after nebs), alternating with inhaled COLISTIN \par ACT adherence is improved, using [place] VEST BID and Aerobika multiple times per day\par Now using HS 7% and tolerating well, continue with Alb/HS/Pulmozyme BID.\par Continue Advair.\par \par MODULATOR - Tolerating symdeko for 4 months now, started in 4/2018. F/U LFTS\par \par ENT - chronic sinusitis - had followup with ENT. Sinuses clear, no need for surgery.\par PND - Continue Dymista, likes more than flonase\par \par Endocrine - OGTT - normal, this year repeat annually.\par Bone density demonstrated osteopenia, Vitamin D deficiency - patient on vitamin D 4000 IU. Not on Calcium recommended to take OTC MVI\par \par Heme - anemia. Hbg 8.9 from 11 IN 2016 last year. Started Niferix, repeat blood work improved hgb WNL\par \par GI - Still needs colonoscopy. Continue pantoprazole for GERD. DC pepcid. \par \par Advanced care/GOC discussion - ONGOING WITH USE OF DECISION AID BEING PILOTED IN OUR CENTER. d/w pt regarding risks and benefits of lung transplant, and ventilator.  participated as well. \par transplant work up after IV course:\par \par complete PFT\par ABG\par Janes V/Q\par ECG\par Echo with bubble study\par RHC\par bone densiity\par barium esophogram\par sniff test, fluorscopy\par

## 2019-03-28 NOTE — REVIEW OF SYSTEMS
[Feeling Poorly] : feeling poorly [Shortness Of Breath] : shortness of breath [Wheezing] : wheezing [Cough] : cough [SOB on Exertion] : shortness of breath during exertion [PND] : PND [see HPI] : see HPI [Negative] : Heme/Lymph [Fever] : no fever [Chills] : no chills [Feeling Tired] : not feeling tired [Nasal Discharge] : no nasal discharge

## 2019-03-28 NOTE — PHYSICAL EXAM
[General Appearance - Well Developed] : well developed [Normal Appearance] : normal appearance [No Deformities] : no deformities [General Appearance - In No Acute Distress] : no acute distress [Normal Conjunctiva] : the conjunctiva exhibited no abnormalities [Eyelids - No Xanthelasma] : the eyelids demonstrated no xanthelasmas [Neck Appearance] : the appearance of the neck was normal [Neck Cervical Mass (___cm)] : no neck mass was observed [Jugular Venous Distention Increased] : there was no jugular-venous distention [Thyroid Diffuse Enlargement] : the thyroid was not enlarged [Thyroid Nodule] : there were no palpable thyroid nodules [Heart Rate And Rhythm] : heart rate was normal and rhythm regular [Heart Sounds] : normal S1 and S2 [Heart Sounds Gallop] : no gallops [Murmurs] : no murmurs [Heart Sounds Pericardial Friction Rub] : no pericardial rub [Respiration, Rhythm And Depth] : normal respiratory rhythm and effort [Exaggerated Use Of Accessory Muscles For Inspiration] : no accessory muscle use [Bowel Sounds] : normal bowel sounds [Abdomen Soft] : soft [Abdomen Tenderness] : non-tender [Abdomen Mass (___ Cm)] : no abdominal mass palpated [Abnormal Walk] : normal gait [Musculoskeletal - Swelling] : no joint swelling seen [Motor Tone] : muscle strength and tone were normal [PICC] : PICC [Arm] : arm [Right] : right [Single] : it is a single lumen catheter [Cyanosis, Localized] : no localized cyanosis [Petechial Hemorrhages (___cm)] : no petechial hemorrhages [Skin Color & Pigmentation] : normal skin color and pigmentation [Skin Turgor] : normal skin turgor [] : no rash [Sensation] : the sensory exam was normal to light touch and pinprick [No Focal Deficits] : no focal deficits [Oriented To Time, Place, And Person] : oriented to person, place, and time [Affect] : the affect was normal [Redness] : no redness [Swelling] : no swelling [Tenderness] : no tenderness [Discharge] : no discharge [Excoriation] : no excoriation of surrounding skin [FreeTextEntry1] : digital clubbing

## 2019-03-29 ENCOUNTER — INPATIENT (INPATIENT)
Facility: HOSPITAL | Age: 44
LOS: 5 days | Discharge: HOME HEALTH | End: 2019-04-04
Attending: INTERNAL MEDICINE | Admitting: INTERNAL MEDICINE
Payer: COMMERCIAL

## 2019-03-29 ENCOUNTER — APPOINTMENT (OUTPATIENT)
Dept: PULMONOLOGY | Facility: CLINIC | Age: 44
End: 2019-03-29

## 2019-03-29 ENCOUNTER — APPOINTMENT (OUTPATIENT)
Dept: SLEEP CENTER | Facility: CLINIC | Age: 44
End: 2019-03-29

## 2019-03-29 VITALS
SYSTOLIC BLOOD PRESSURE: 134 MMHG | RESPIRATION RATE: 17 BRPM | TEMPERATURE: 98 F | OXYGEN SATURATION: 96 % | DIASTOLIC BLOOD PRESSURE: 76 MMHG | HEART RATE: 94 BPM

## 2019-03-29 DIAGNOSIS — E84.9 CYSTIC FIBROSIS, UNSPECIFIED: ICD-10-CM

## 2019-03-29 DIAGNOSIS — E84.0 CYSTIC FIBROSIS WITH PULMONARY MANIFESTATIONS: ICD-10-CM

## 2019-03-29 DIAGNOSIS — Z29.9 ENCOUNTER FOR PROPHYLACTIC MEASURES, UNSPECIFIED: ICD-10-CM

## 2019-03-29 LAB
ALBUMIN SERPL ELPH-MCNC: 4.8 G/DL — SIGNIFICANT CHANGE UP (ref 3.3–5)
ALP SERPL-CCNC: 68 U/L — SIGNIFICANT CHANGE UP (ref 40–120)
ALT FLD-CCNC: 11 U/L — SIGNIFICANT CHANGE UP (ref 4–33)
ANION GAP SERPL CALC-SCNC: 15 MMO/L — HIGH (ref 7–14)
AST SERPL-CCNC: 12 U/L — SIGNIFICANT CHANGE UP (ref 4–32)
BILIRUB SERPL-MCNC: < 0.2 MG/DL — LOW (ref 0.2–1.2)
BUN SERPL-MCNC: 8 MG/DL — SIGNIFICANT CHANGE UP (ref 7–23)
CALCIUM SERPL-MCNC: 9.5 MG/DL — SIGNIFICANT CHANGE UP (ref 8.4–10.5)
CHLORIDE SERPL-SCNC: 102 MMOL/L — SIGNIFICANT CHANGE UP (ref 98–107)
CO2 SERPL-SCNC: 22 MMOL/L — SIGNIFICANT CHANGE UP (ref 22–31)
CREAT SERPL-MCNC: 0.52 MG/DL — SIGNIFICANT CHANGE UP (ref 0.5–1.3)
GLUCOSE SERPL-MCNC: 111 MG/DL — HIGH (ref 70–99)
HCT VFR BLD CALC: 36.8 % — SIGNIFICANT CHANGE UP (ref 34.5–45)
HGB BLD-MCNC: 11.5 G/DL — SIGNIFICANT CHANGE UP (ref 11.5–15.5)
LACTATE SERPL-SCNC: 1.7 MMOL/L — SIGNIFICANT CHANGE UP (ref 0.5–2)
MAGNESIUM SERPL-MCNC: 2 MG/DL — SIGNIFICANT CHANGE UP (ref 1.6–2.6)
MCHC RBC-ENTMCNC: 29.7 PG — SIGNIFICANT CHANGE UP (ref 27–34)
MCHC RBC-ENTMCNC: 31.3 % — LOW (ref 32–36)
MCV RBC AUTO: 95.1 FL — SIGNIFICANT CHANGE UP (ref 80–100)
NRBC # FLD: 0 K/UL — SIGNIFICANT CHANGE UP (ref 0–0)
PHOSPHATE SERPL-MCNC: 2.9 MG/DL — SIGNIFICANT CHANGE UP (ref 2.5–4.5)
PLATELET # BLD AUTO: 396 K/UL — SIGNIFICANT CHANGE UP (ref 150–400)
PMV BLD: 9 FL — SIGNIFICANT CHANGE UP (ref 7–13)
POTASSIUM SERPL-MCNC: 3.9 MMOL/L — SIGNIFICANT CHANGE UP (ref 3.5–5.3)
POTASSIUM SERPL-SCNC: 3.9 MMOL/L — SIGNIFICANT CHANGE UP (ref 3.5–5.3)
PROT SERPL-MCNC: 8.4 G/DL — HIGH (ref 6–8.3)
RBC # BLD: 3.87 M/UL — SIGNIFICANT CHANGE UP (ref 3.8–5.2)
RBC # FLD: 13.5 % — SIGNIFICANT CHANGE UP (ref 10.3–14.5)
SODIUM SERPL-SCNC: 139 MMOL/L — SIGNIFICANT CHANGE UP (ref 135–145)
WBC # BLD: 9.95 K/UL — SIGNIFICANT CHANGE UP (ref 3.8–10.5)
WBC # FLD AUTO: 9.95 K/UL — SIGNIFICANT CHANGE UP (ref 3.8–10.5)

## 2019-03-29 PROCEDURE — 99254 IP/OBS CNSLTJ NEW/EST MOD 60: CPT

## 2019-03-29 PROCEDURE — 93010 ELECTROCARDIOGRAM REPORT: CPT

## 2019-03-29 RX ORDER — FEXOFENADINE HCL 30 MG
1 TABLET ORAL
Qty: 0 | Refills: 0 | COMMUNITY

## 2019-03-29 RX ORDER — LORATADINE 10 MG/1
10 TABLET ORAL DAILY
Qty: 0 | Refills: 0 | Status: DISCONTINUED | OUTPATIENT
Start: 2019-03-29 | End: 2019-04-04

## 2019-03-29 RX ORDER — FAMOTIDINE 10 MG/ML
1 INJECTION INTRAVENOUS
Qty: 0 | Refills: 0 | COMMUNITY

## 2019-03-29 RX ORDER — DORNASE ALFA 1 MG/ML
2.5 SOLUTION RESPIRATORY (INHALATION)
Qty: 0 | Refills: 0 | COMMUNITY

## 2019-03-29 RX ORDER — IRON POLYSACCHARIDE COMPLEX 150 MG
50 CAPSULE ORAL
Qty: 0 | Refills: 0 | COMMUNITY

## 2019-03-29 RX ORDER — CHOLECALCIFEROL (VITAMIN D3) 125 MCG
400 CAPSULE ORAL DAILY
Qty: 0 | Refills: 0 | Status: DISCONTINUED | OUTPATIENT
Start: 2019-03-29 | End: 2019-04-01

## 2019-03-29 RX ORDER — ALBUTEROL 90 UG/1
1 AEROSOL, METERED ORAL
Qty: 0 | Refills: 0 | Status: DISCONTINUED | OUTPATIENT
Start: 2019-03-29 | End: 2019-04-04

## 2019-03-29 RX ORDER — PANTOPRAZOLE SODIUM 20 MG/1
40 TABLET, DELAYED RELEASE ORAL
Qty: 0 | Refills: 0 | Status: DISCONTINUED | OUTPATIENT
Start: 2019-03-29 | End: 2019-04-04

## 2019-03-29 RX ORDER — DIPHENHYDRAMINE HCL 50 MG
1 CAPSULE ORAL
Qty: 0 | Refills: 0 | COMMUNITY

## 2019-03-29 RX ORDER — CEFTAZIDIME 6 G/30ML
2 INJECTION, POWDER, FOR SOLUTION INTRAVENOUS ONCE
Qty: 0 | Refills: 0 | Status: COMPLETED | OUTPATIENT
Start: 2019-03-29 | End: 2019-03-29

## 2019-03-29 RX ORDER — ACETAMINOPHEN 500 MG
650 TABLET ORAL ONCE
Qty: 0 | Refills: 0 | Status: COMPLETED | OUTPATIENT
Start: 2019-03-29 | End: 2019-03-29

## 2019-03-29 RX ORDER — ALBUTEROL 90 UG/1
2.5 AEROSOL, METERED ORAL
Qty: 0 | Refills: 0 | Status: DISCONTINUED | OUTPATIENT
Start: 2019-03-29 | End: 2019-04-04

## 2019-03-29 RX ORDER — ALBUTEROL 90 UG/1
2 AEROSOL, METERED ORAL
Qty: 0 | Refills: 0 | COMMUNITY

## 2019-03-29 RX ORDER — CEFTAZIDIME 6 G/30ML
INJECTION, POWDER, FOR SOLUTION INTRAVENOUS
Qty: 0 | Refills: 0 | Status: DISCONTINUED | OUTPATIENT
Start: 2019-03-29 | End: 2019-04-04

## 2019-03-29 RX ORDER — SODIUM CHLORIDE 9 MG/ML
4 INJECTION INTRAMUSCULAR; INTRAVENOUS; SUBCUTANEOUS
Qty: 0 | Refills: 0 | Status: DISCONTINUED | OUTPATIENT
Start: 2019-03-29 | End: 2019-04-04

## 2019-03-29 RX ORDER — DORNASE ALFA 1 MG/ML
2.5 SOLUTION RESPIRATORY (INHALATION)
Qty: 0 | Refills: 0 | Status: DISCONTINUED | OUTPATIENT
Start: 2019-03-29 | End: 2019-04-04

## 2019-03-29 RX ORDER — ALBUTEROL 90 UG/1
3 AEROSOL, METERED ORAL
Qty: 0 | Refills: 0 | COMMUNITY

## 2019-03-29 RX ORDER — CHOLECALCIFEROL (VITAMIN D3) 125 MCG
3000 CAPSULE ORAL
Qty: 0 | Refills: 0 | COMMUNITY

## 2019-03-29 RX ORDER — CEFTAZIDIME 6 G/30ML
2 INJECTION, POWDER, FOR SOLUTION INTRAVENOUS EVERY 8 HOURS
Qty: 0 | Refills: 0 | Status: DISCONTINUED | OUTPATIENT
Start: 2019-03-29 | End: 2019-04-04

## 2019-03-29 RX ADMIN — Medication 650 MILLIGRAM(S): at 20:25

## 2019-03-29 RX ADMIN — SODIUM CHLORIDE 4 MILLILITER(S): 9 INJECTION INTRAMUSCULAR; INTRAVENOUS; SUBCUTANEOUS at 22:45

## 2019-03-29 RX ADMIN — PANTOPRAZOLE SODIUM 40 MILLIGRAM(S): 20 TABLET, DELAYED RELEASE ORAL at 21:45

## 2019-03-29 RX ADMIN — ALBUTEROL 2.5 MILLIGRAM(S): 90 AEROSOL, METERED ORAL at 22:30

## 2019-03-29 RX ADMIN — DORNASE ALFA 2.5 MILLIGRAM(S): 1 SOLUTION RESPIRATORY (INHALATION) at 22:58

## 2019-03-29 RX ADMIN — Medication 650 MILLIGRAM(S): at 21:15

## 2019-03-29 RX ADMIN — Medication 400 UNIT(S): at 21:46

## 2019-03-29 RX ADMIN — CEFTAZIDIME 100 GRAM(S): 6 INJECTION, POWDER, FOR SOLUTION INTRAVENOUS at 18:39

## 2019-03-29 NOTE — CONSULT NOTE ADULT - ASSESSMENT
The pt is 44 y/o F with a PMH of CF who presents for monitoring while receiving antibiotics for her CFexacerbation in the setting of multiple drug allergies and concern for cross-reactivity.   No fever or leucocytosis   MDR Achromobacter infection.   Allergies to multiple abx.     Plan:   Start Ceftazidime 2 gm iv q8h  No other options except Polymyxin.  Pt had rash to PCN, explained the cross reactivity with cephalosporins, pt understands.   Monitor for rash or other reaction  Start Inhaled polymyxin   Sputum cx  Rest of the management per pulm.   Discussed with Dr Hollis, no role of imaging at this time.

## 2019-03-29 NOTE — H&P ADULT - NSHPREVIEWOFSYSTEMS_GEN_ALL_CORE
REVIEW OF SYSTEMS:    CONSTITUTIONAL: No weakness, fevers or chills  EYES/ENT: No visual changes;  No vertigo or throat pain   NECK: No pain or stiffness  RESPIRATORY: as above  CARDIOVASCULAR: as above  GASTROINTESTINAL: No abdominal or epigastric pain. No nausea, vomiting, or hematemesis; No diarrhea or constipation. No melena or hematochezia.  GENITOURINARY: No dysuria, frequency or hematuria  NEUROLOGICAL: No numbness or weakness  SKIN: No itching, burning, rashes, or lesions   All other review of systems is negative unless indicated above.

## 2019-03-29 NOTE — CONSULT NOTE ADULT - ATTENDING COMMENTS
Jaquan Zendejas  Pager: [phone]. If no response or past 5 pm call [phone].    Please call ID service for questions over weekend at [phone].

## 2019-03-29 NOTE — CONSULT NOTE ADULT - SUBJECTIVE AND OBJECTIVE BOX
Patient is a 43y old  Female who presents with a chief complaint of CF exacerbation (29 Mar 2019 13:52)      HPI:  The pt is 42 y/o F with a PMH of CF who presents for monitoring while receiving Avycaz in the setting of multiple drug allergies and concern for cross-reactivity. She reports feeling SOB since February with prolonged exertion, such as walking long distances, though she is not SOB at baseline. She has been taking ciprofloxacin and has been on prednisone 20 mg daily, which has been helping her to resolve these symptoms. She reports occasional cough when she receives chest PT, but denies cough at baseline. She also complains of some occasional chest tightness which she attributes to inflammation of her lungs.  She denies fevers, chills, diarrhea, nausea, vomiting or other issues. (29 Mar 2019 13:52)  Above reviewed;   Pt first started experiencing these symptoms in beginning of Feb when she was hospitalized, desensitized to bactrim and was on bactrim, discharged and has been on other abx since then, with no difference in symptoms, was evaluated recently and started on prednisone 10 mg a week ago which helped a little but her PFT's did not improve so is sent in for iv abx. Pt has a PICC since Feb. No fever or chills.       PAST MEDICAL & SURGICAL HISTORY:  Deviated septum  CF (Cystic Fibrosis)  sinus surgery-    (Normal Spontaneous Vaginal Delivery)      REVIEW OF SYSTEMS    General: Denies any chills. Fevers absent    Skin: No rash  	  Ophthalmologic: Denies any  discharge, redness.  	  ENT: No nasal congestion or throat pain.     Respiratory and Thorax: Per HPI, + chest tightness.   	  Cardiovascular: No chest pain, palpitations.    Gastrointestinal: No nausea, abdominal pain or diarrhea.    Genitourinary: No dysuria, frequency. No flank pain.    Musculoskeletal: No joint swelling or pain.    Neurological: No confusion. No extremity weakness.    Psychiatric: No hallucinations	    Endocrine: No abnormal heat/cold intolerance    Allergic/Immunologic: No hives or rash       Social history:  , lives with family, no smoking.       FAMILY HISTORY:  Family history of diabetes mellitus (Father)  Family history of cystic fibrosis (Sibling)      Allergies  adhesives (Rash)  chlorhexidine containing compounds (Rash)  clindamycin (Hives)  fluoroquinolone antibiotics (Unknown)  levofloxacin (Hives)  macrolide antibiotics (Other)  minocycline (Rash)  moxifloxacin (Rash)  penicillin (Hives)  Zithromax Z-Ivan (Hives)        Antimicrobials:    cefTAZidime  IVPB 2 Gram(s) IV Intermittent every 8 hours        Vital Signs Last 24 Hrs  T(C): 36.6 (29 Mar 2019 12:09), Max: 36.6 (29 Mar 2019 12:09)  T(F): 97.9 (29 Mar 2019 12:09), Max: 97.9 (29 Mar 2019 12:09)  HR: 94 (29 Mar 2019 12:09) (94 - 94)  BP: 134/76 (29 Mar 2019 12:09) (134/76 - 134/76)  RR: 17 (29 Mar 2019 12:09) (17 - 17)  SpO2: 96% (29 Mar 2019 12:09) (96% - 96%)      PHYSICAL EXAM: Patient in no acute distress.    Constitutional: Comfortable. Awake and alert    Eyes: No discharge or conjunctival injection    ENT: No thrush. No pharyngeal exudate or erythema.    Neck: Supple,     Respiratory: + air entry bilaterally with some wheeze.     Cardiovascular: S1 S2 wnl, No murmurs.    Gastrointestinal: Soft BS(+) no tenderness, non distended.    Genitourinary: No CVA tenderness     Extremities: No edema. Rt arm PICC     Vascular: peripheral pulses felt    Neurological: AAO X 3. No grossly focal deficits.    Skin: No rash     Musculoskeletal: No joint swelling.    Psychiatric: Affect normal.                              11.5   9.95  )-----------( 396      ( 29 Mar 2019 15:45 )             36.8           139  |  102  |  8   ----------------------------<  111<H>  3.9   |  22  |  0.52    Ca    9.5      29 Mar 2019 15:45  Phos  2.9       Mg     2.0         TPro  8.4<H>  /  Alb  4.8  /  TBili  < 0.2<L>  /  DBili  x   /  AST  12  /  ALT  11  /  AlkPhos  68        Culture - Respiratory with Gram Stain (02.15.19 @ 18:50)  ACHXLMD^Achromobacter xylosoxidans CRE  QUANTITY OF GROWTH: MODERATE  The CRE label is based on newer CLSI breakpoints:  Meropenem >2 and/or  Imipenem >2  and/or  Ertapenem >1

## 2019-03-29 NOTE — CHART NOTE - NSCHARTNOTEFT_GEN_A_CORE
Patient is undergoing evaluation for lung transplantation  Will continue transplant workup while she is inpatient given her recent decompensations    She requires:    Quantitative V/Q scan  EKG  arterial blood gas  Echocardiogram with bubble study  Right heart catheterization  Bone density scan  barium esophagram  Fluoroscopic "sniff" test for diaphragmatic excursion  Nocturnal pulse oximetry        Plan for ceftazadime for treatment of CF exacerbation discussed with medicine team.   Will continue airway clearance inhaled therapy as well.     Teo Iraheta MD  Pulmonary/Critical Care Fellow  page: [phone]

## 2019-03-29 NOTE — H&P ADULT - NSHPPHYSICALEXAM_GEN_ALL_CORE
PHYSICAL EXAM:    GENERAL: Comfortable, no acute distress   HEAD:  Normocephalic, atraumatic  HEENT: Moist mucous membranes  NECK: Supple, No JVD  NERVOUS SYSTEM:  Alert & Oriented X3  CHEST/LUNG: diffuse rhonchi b/l  HEART: Regular rate and rhythm, no murmur   ABDOMEN: Soft, Nontender, Nondistended, Bowel sounds present  EXTREMITIES:   No clubbing, cyanosis, or edema  MUSCULOSKELTAL- No muscle tenderness, no joint tenderness  SKIN- warm and dry, no rash

## 2019-03-29 NOTE — H&P ADULT - NSICDXFAMILYHX_GEN_ALL_CORE_FT
FAMILY HISTORY:  Father  Still living? Unknown  Family history of diabetes mellitus, Age at diagnosis: Age Unknown    Sibling  Still living? Unknown  Family history of cystic fibrosis, Age at diagnosis: Age Unknown

## 2019-03-29 NOTE — H&P ADULT - HISTORY OF PRESENT ILLNESS
The pt is 42 y/o F with a PMH of CF who presents for monitoring while receiving Avycaz in the setting of multiple drug allergies and concern for cross-reactivity. She reports feeling SOB since February with prolonged exertion, such as walking long distances, though she is not SOB at baseline. She has been taking ciprofloxacin and has been on prednisone 20 mg daily, which has been helping her to resolve these symptoms. She reports occasional cough when she receives chest PT, but denies cough at baseline. She also complains of some occasional chest tightness which she attributes to inflammation of her lungs.  She denies fevers, chills, diarrhea, nausea, vomiting or other issues.

## 2019-03-29 NOTE — H&P ADULT - ASSESSMENT
The pt is 44 y/o F with a PMH of CF who presents for monitoring while receiving Avycaz in the setting of multiple drug allergies and concern for cross-reactivity.

## 2019-03-29 NOTE — H&P ADULT - PROBLEM SELECTOR PLAN 1
Pt to be started on avycaz 2.5 mg IV q8 as per pulm  Will c/w prednisone 20 mg daily    Sputum cx achrombacter sensitive to ceftazidime  duonebs prn for SOB Pt to be started on avycaz 2.5 mg IV q8 as per pulm; ID to see pt prior to approving medication  Will c/w prednisone 20 mg daily   albuterol nebulizer Q6H, hypersaline 7% BID, pulmozyme BID  chest vest  PT consult  c/w home advair  Sputum cx achrombacter sensitive to ceftazidime Pt to be started on avycaz 2.5 mg IV q8 as per pulm; ID to see pt prior to approving medication  Will c/w prednisone 20 mg daily   albuterol nebulizer Q6H, hypersaline 7% BID, pulmozyme BID  chest vest  PT consult  c/w home advair    Sputum cx achrombacter sensitive to ceftazidime

## 2019-03-30 LAB
GRAM STN SPT: SIGNIFICANT CHANGE UP
SPECIMEN SOURCE: SIGNIFICANT CHANGE UP
SPECIMEN SOURCE: SIGNIFICANT CHANGE UP

## 2019-03-30 PROCEDURE — 99232 SBSQ HOSP IP/OBS MODERATE 35: CPT | Mod: GC

## 2019-03-30 PROCEDURE — 78597 LUNG PERFUSION DIFFERENTIAL: CPT | Mod: 26

## 2019-03-30 RX ORDER — ACETAMINOPHEN 500 MG
650 TABLET ORAL ONCE
Qty: 0 | Refills: 0 | Status: COMPLETED | OUTPATIENT
Start: 2019-03-30 | End: 2019-03-30

## 2019-03-30 RX ADMIN — CEFTAZIDIME 100 GRAM(S): 6 INJECTION, POWDER, FOR SOLUTION INTRAVENOUS at 19:36

## 2019-03-30 RX ADMIN — SODIUM CHLORIDE 4 MILLILITER(S): 9 INJECTION INTRAMUSCULAR; INTRAVENOUS; SUBCUTANEOUS at 21:28

## 2019-03-30 RX ADMIN — Medication 650 MILLIGRAM(S): at 19:53

## 2019-03-30 RX ADMIN — Medication 650 MILLIGRAM(S): at 20:35

## 2019-03-30 RX ADMIN — DORNASE ALFA 2.5 MILLIGRAM(S): 1 SOLUTION RESPIRATORY (INHALATION) at 08:26

## 2019-03-30 RX ADMIN — Medication 20 MILLIGRAM(S): at 05:18

## 2019-03-30 RX ADMIN — CEFTAZIDIME 100 GRAM(S): 6 INJECTION, POWDER, FOR SOLUTION INTRAVENOUS at 12:23

## 2019-03-30 RX ADMIN — ALBUTEROL 2.5 MILLIGRAM(S): 90 AEROSOL, METERED ORAL at 21:20

## 2019-03-30 RX ADMIN — Medication 400 UNIT(S): at 12:23

## 2019-03-30 RX ADMIN — PANTOPRAZOLE SODIUM 40 MILLIGRAM(S): 20 TABLET, DELAYED RELEASE ORAL at 05:18

## 2019-03-30 RX ADMIN — CEFTAZIDIME 100 GRAM(S): 6 INJECTION, POWDER, FOR SOLUTION INTRAVENOUS at 03:09

## 2019-03-30 RX ADMIN — SODIUM CHLORIDE 4 MILLILITER(S): 9 INJECTION INTRAMUSCULAR; INTRAVENOUS; SUBCUTANEOUS at 08:15

## 2019-03-30 RX ADMIN — DORNASE ALFA 2.5 MILLIGRAM(S): 1 SOLUTION RESPIRATORY (INHALATION) at 21:38

## 2019-03-30 RX ADMIN — ALBUTEROL 2.5 MILLIGRAM(S): 90 AEROSOL, METERED ORAL at 08:06

## 2019-03-30 NOTE — PROGRESS NOTE ADULT - SUBJECTIVE AND OBJECTIVE BOX
Patient is a 43y old  Female who presents with a chief complaint of CF exacerbation (30 Mar 2019 09:31)      SUBJECTIVE / OVERNIGHT EVENTS:  No acute events overnight. Pt is breathing comfortably on RA and is not in apparent distress.    MEDICATIONS  (STANDING):  ALBUTerol    0.083% 2.5 milliGRAM(s) Nebulizer two times a day  cefTAZidime  IVPB      cefTAZidime  IVPB 2 Gram(s) IV Intermittent every 8 hours  cholecalciferol 400 Unit(s) Oral daily  dornase diana Solution 2.5 milliGRAM(s) Inhalation two times a day  loratadine 10 milliGRAM(s) Oral daily  pantoprazole    Tablet 40 milliGRAM(s) Oral before breakfast  predniSONE   Tablet 20 milliGRAM(s) Oral daily  sodium chloride 7% Inhalation 4 milliLiter(s) Inhalation two times a day    MEDICATIONS  (PRN):  ALBUTerol    90 MICROgram(s) HFA Inhaler 1 Puff(s) Inhalation four times a day PRN Bronchospasm      Vital Signs Last 24 Hrs  T(C): 36.8 (30 Mar 2019 07:07), Max: 36.8 (30 Mar 2019 07:07)  T(F): 98.2 (30 Mar 2019 07:07), Max: 98.2 (30 Mar 2019 07:07)  HR: 76 (30 Mar 2019 08:30) (74 - 94)  BP: 101/63 (30 Mar 2019 07:07) (101/63 - 134/76)  BP(mean): --  RR: 18 (30 Mar 2019 07:07) (17 - 18)  SpO2: 100% (30 Mar 2019 08:30) (96% - 100%)    CAPILLARY BLOOD GLUCOSE        I&O's Summary      PHYSICAL EXAM:  GENERAL: Comfortable, no acute distress   	HEAD:  Normocephalic, atraumatic  	HEENT: Moist mucous membranes  	NECK: Supple, No JVD  	NERVOUS SYSTEM:  Alert & Oriented X3  	CHEST/LUNG: diffuse rhonchi b/l  	HEART: Regular rate and rhythm, no murmur   	ABDOMEN: Soft, Nontender, Nondistended, Bowel sounds present  	EXTREMITIES:   No clubbing, cyanosis, or edema  	MUSCULOSKELTAL- No muscle tenderness, no joint tenderness  SKIN- warm and dry, no rash    LABS:                        11.5   9.95  )-----------( 396      ( 29 Mar 2019 15:45 )             36.8     Auto Eosinophil # x     / Auto Eosinophil % x     / Auto Neutrophil # x     / Auto Neutrophil % x     / BANDS % x        03-29    139  |  102  |  8   ----------------------------<  111<H>  3.9   |  22  |  0.52    Ca    9.5      29 Mar 2019 15:45  Mg     2.0     03-29  Phos  2.9     03-29  TPro  8.4<H>  /  Alb  4.8  /  TBili  < 0.2<L>  /  DBili  x   /  AST  12  /  ALT  11  /  AlkPhos  68  03-29            Lactate, Blood: 1.7 mmol/L (03-29 @ 15:45)      RESPIRATORY  VENT:    ABG:     VBG:     RADIOLOGY & ADDITIONAL TESTS:  (Imaging Personally Reviewed)NA    Consultant(s) Notes Reviewed: ID    Care Discussed with Consultants/Other Providers:

## 2019-03-30 NOTE — PROGRESS NOTE ADULT - PROBLEM SELECTOR PLAN 2
Lovenox subq Lovenox subq    Screening: pt noted to have Cr-Parkinson White Pattern on EKG, but is asymptomatic with normal HR

## 2019-03-30 NOTE — PROGRESS NOTE ADULT - ASSESSMENT
The pt is 42 y/o F with a PMH of CF who presents for monitoring while receiving Avycaz in the setting of multiple drug allergies and concern for cross-reactivity. The pt is 42 y/o F with a PMH of CF who presents for monitoring while receiving Avycaz in the setting of multiple drug allergies and concern for cross-reactivity, pending lung transplant eval

## 2019-03-30 NOTE — PROGRESS NOTE ADULT - ASSESSMENT
The pt is 44 y/o F with a PMH of CF who presents for monitoring while receiving antibiotics for her CFexacerbation in the setting of multiple drug allergies and concern for cross-reactivity.   No fever or leucocytosis   MDR Achromobacter infection.   Allergies to multiple abx.   Tolerated ceftazidime    Plan:   c/w Ceftazidime 2 gm IV q8h  No other options except Polymyxin.  Monitor for rash or other reaction  Start Inhaled polymyxin   Sputum cx  Rest of the management per pulm. The pt is 42 y/o F with a PMH of CF who presents for monitoring while receiving antibiotics for her CFexacerbation in the setting of multiple drug allergies and concern for cross-reactivity.   No fever or leukocytosis   MDR Achromobacter infection.   Allergies to multiple abx.   Tolerated ceftazidime overnight.    Plan:   c/w Ceftazidime 2 gm IV q8h  No other options except Polymyxin.  Monitor for rash or other reaction  Start Inhaled polymyxin   Sputum cx  Rest of the management per pulm.

## 2019-03-30 NOTE — PROGRESS NOTE ADULT - PROBLEM SELECTOR PLAN 1
Pt to be started on avycaz 2.5 mg IV q8 as per pulm; ID to see pt prior to approving medication  Will c/w prednisone 20 mg daily   albuterol nebulizer Q6H, hypersaline 7% BID, pulmozyme BID  chest vest  PT consult  c/w home advair    Sputum cx achrombacter sensitive to ceftazidime C/W ceftazidime as per ID;   Will c/w prednisone 20 mg daily   albuterol nebulizer Q6H, hypersaline 7% BID, pulmozyme BID  chest vest  PT consult  c/w home advair  Previous sputum cx achrombacter sensitive to ceftazidime

## 2019-03-30 NOTE — PROGRESS NOTE ADULT - SUBJECTIVE AND OBJECTIVE BOX
Follow Up:  CF, multiple allergies    Interval History/ROS: feels better-unsure if because prednisone was increased or because of ceftazidime. Tolerated ceftazidime, no rash    Allergies  adhesives (Rash)  chlorhexidine containing compounds (Rash)  clindamycin (Hives)  fluoroquinolone antibiotics (Unknown)  levofloxacin (Hives)  macrolide antibiotics (Other)  minocycline (Rash)  moxifloxacin (Rash)  penicillin (Hives)  Zithromax Z-Ivan (Hives)        ANTIMICROBIALS:  cefTAZidime  IVPB    cefTAZidime  IVPB 2 every 8 hours      OTHER MEDS:  MEDICATIONS  (STANDING):  ALBUTerol    0.083% 2.5 two times a day  ALBUTerol    90 MICROgram(s) HFA Inhaler 1 four times a day PRN  dornase diana Solution 2.5 two times a day  loratadine 10 daily  pantoprazole    Tablet 40 before breakfast  predniSONE   Tablet 20 daily  sodium chloride 7% Inhalation 4 two times a day      Vital Signs Last 24 Hrs  T(C): 36.8 (30 Mar 2019 07:07), Max: 36.8 (30 Mar 2019 07:07)  T(F): 98.2 (30 Mar 2019 07:07), Max: 98.2 (30 Mar 2019 07:07)  HR: 76 (30 Mar 2019 08:30) (74 - 94)  BP: 101/63 (30 Mar 2019 07:07) (101/63 - 134/76)  BP(mean): --  RR: 18 (30 Mar 2019 07:07) (17 - 18)  SpO2: 100% (30 Mar 2019 08:30) (96% - 100%)    PHYSICAL EXAM:  General: non-toxic  HEAD/EYES: anicteric, PERRL  ENT:  supple  Cardiovascular:   S1, S2  Respiratory:  clear bilaterally  GI:  soft, non-tender, normal bowel sounds  :  no CVA tenderness   Musculoskeletal:  no synovitis  Neurologic:  grossly non-focal  Skin:  no rash  Lymph: no lymphadenopathy  Psychiatric:  appropriate affect  Vascular:  no phlebitis                                11.5   9.95  )-----------( 396      ( 29 Mar 2019 15:45 )             36.8       03-29    139  |  102  |  8   ----------------------------<  111<H>  3.9   |  22  |  0.52    Ca    9.5      29 Mar 2019 15:45  Phos  2.9     03-29  Mg     2.0     03-29    TPro  8.4<H>  /  Alb  4.8  /  TBili  < 0.2<L>  /  DBili  x   /  AST  12  /  ALT  11  /  AlkPhos  68  03-29          MICROBIOLOGY:  v            RADIOLOGY: Follow Up:  CF, multiple allergies    Interval History/ROS: feels better-unsure if because prednisone was increased or because of ceftazidime. Tolerated ceftazidime, no rash  Some chest tightness and SOB.  No diarrhea. Tolerating PO.     Allergies  adhesives (Rash)  chlorhexidine containing compounds (Rash)  clindamycin (Hives)  fluoroquinolone antibiotics (Unknown)  levofloxacin (Hives)  macrolide antibiotics (Other)  minocycline (Rash)  moxifloxacin (Rash)  penicillin (Hives)  Zithromax Z-Ivan (Hives)        ANTIMICROBIALS:  cefTAZidime  IVPB    cefTAZidime  IVPB 2 every 8 hours      OTHER MEDS:  MEDICATIONS  (STANDING):  ALBUTerol    0.083% 2.5 two times a day  ALBUTerol    90 MICROgram(s) HFA Inhaler 1 four times a day PRN  dornase diana Solution 2.5 two times a day  loratadine 10 daily  pantoprazole    Tablet 40 before breakfast  predniSONE   Tablet 20 daily  sodium chloride 7% Inhalation 4 two times a day      Vital Signs Last 24 Hrs  T(C): 36.8 (30 Mar 2019 07:07), Max: 36.8 (30 Mar 2019 07:07)  T(F): 98.2 (30 Mar 2019 07:07), Max: 98.2 (30 Mar 2019 07:07)  HR: 76 (30 Mar 2019 08:30) (74 - 94)  BP: 101/63 (30 Mar 2019 07:07) (101/63 - 134/76)  BP(mean): --  RR: 18 (30 Mar 2019 07:07) (17 - 18)  SpO2: 100% (30 Mar 2019 08:30) (96% - 100%)    PHYSICAL EXAM:  General: non-toxic  HEAD/EYES: anicteric, PERRL  ENT:  supple  Cardiovascular:   S1, S2  Respiratory:  coarse left base, no wheeze  GI:  soft, non-tender, normal bowel sounds  :  no CVA tenderness   Musculoskeletal:  no synovitis  Neurologic:  grossly non-focal  Skin:  no rash  Lymph: no lymphadenopathy  Psychiatric:  appropriate affect  Vascular:  no phlebitis                                        11.5   9.95  )-----------( 396      ( 29 Mar 2019 15:45 )             36.8       03-29    139  |  102  |  8   ----------------------------<  111<H>  3.9   |  22  |  0.52    Ca    9.5      29 Mar 2019 15:45  Phos  2.9     03-29  Mg     2.0     03-29    TPro  8.4<H>  /  Alb  4.8  /  TBili  < 0.2<L>  /  DBili  x   /  AST  12  /  ALT  11  /  AlkPhos  68  03-29      MICROBIOLOGY:  Culture - Respiratory with Gram Stain (02.15.19 @ 18:50)    Gram Stain Sputum:   GPCPR^Gram Pos Cocci in Pairs  QUANTITY OF BACTERIA SEEN: MODERATE (3+)  GPCCH^Gram Pos Cocci in Chains  QUANTITY OF BACTERIA SEEN: MODERATE (3+)  YEAST^YEAST.  QUANTITY OF BACTERIA SEEN: RARE (1+)  WBC^White Blood Cells  QNTY CELLS IN GRAM STAIN: FEW (2+)    Culture - Respiratory:   NRF^Normal Respiratory Jillian  QUANTITY OF GROWTH: MODERATE    Culture - Respiratory:   Normal Respiratory Jillian Also Present  RESULT CALLED TO / READ BACK:CYDNEY RENNER,C/Y  DATE / TIME CALLED: 02/22/19 1909  CALLED BY: LARRY BANKS  ACHXLMD^Achromobacter xylosoxidans CRE  QUANTITY OF GROWTH: MODERATE  The CRE label is based on newer CLSI breakpoints:  Meropenem >2 and/or  Imipenem >2  and/or  Ertapenem >1    Specimen Source: SPUTUM        RADIOLOGY:

## 2019-03-31 PROCEDURE — 99232 SBSQ HOSP IP/OBS MODERATE 35: CPT | Mod: GC

## 2019-03-31 RX ORDER — ACETAMINOPHEN 500 MG
650 TABLET ORAL ONCE
Qty: 0 | Refills: 0 | Status: COMPLETED | OUTPATIENT
Start: 2019-03-31 | End: 2019-03-31

## 2019-03-31 RX ORDER — ACETAMINOPHEN 500 MG
650 TABLET ORAL EVERY 6 HOURS
Qty: 0 | Refills: 0 | Status: DISCONTINUED | OUTPATIENT
Start: 2019-03-31 | End: 2019-04-04

## 2019-03-31 RX ADMIN — CEFTAZIDIME 100 GRAM(S): 6 INJECTION, POWDER, FOR SOLUTION INTRAVENOUS at 05:21

## 2019-03-31 RX ADMIN — ALBUTEROL 2.5 MILLIGRAM(S): 90 AEROSOL, METERED ORAL at 21:05

## 2019-03-31 RX ADMIN — CEFTAZIDIME 100 GRAM(S): 6 INJECTION, POWDER, FOR SOLUTION INTRAVENOUS at 11:54

## 2019-03-31 RX ADMIN — Medication 20 MILLIGRAM(S): at 05:22

## 2019-03-31 RX ADMIN — ALBUTEROL 2.5 MILLIGRAM(S): 90 AEROSOL, METERED ORAL at 08:00

## 2019-03-31 RX ADMIN — SODIUM CHLORIDE 4 MILLILITER(S): 9 INJECTION INTRAMUSCULAR; INTRAVENOUS; SUBCUTANEOUS at 08:07

## 2019-03-31 RX ADMIN — Medication 400 UNIT(S): at 11:54

## 2019-03-31 RX ADMIN — PANTOPRAZOLE SODIUM 40 MILLIGRAM(S): 20 TABLET, DELAYED RELEASE ORAL at 05:22

## 2019-03-31 RX ADMIN — DORNASE ALFA 2.5 MILLIGRAM(S): 1 SOLUTION RESPIRATORY (INHALATION) at 08:15

## 2019-03-31 RX ADMIN — Medication 650 MILLIGRAM(S): at 17:36

## 2019-03-31 RX ADMIN — Medication 650 MILLIGRAM(S): at 09:28

## 2019-03-31 RX ADMIN — DORNASE ALFA 2.5 MILLIGRAM(S): 1 SOLUTION RESPIRATORY (INHALATION) at 21:20

## 2019-03-31 RX ADMIN — CEFTAZIDIME 100 GRAM(S): 6 INJECTION, POWDER, FOR SOLUTION INTRAVENOUS at 21:06

## 2019-03-31 RX ADMIN — Medication 650 MILLIGRAM(S): at 17:02

## 2019-03-31 RX ADMIN — Medication 650 MILLIGRAM(S): at 10:28

## 2019-03-31 RX ADMIN — SODIUM CHLORIDE 4 MILLILITER(S): 9 INJECTION INTRAMUSCULAR; INTRAVENOUS; SUBCUTANEOUS at 21:12

## 2019-03-31 NOTE — PROGRESS NOTE ADULT - PROBLEM SELECTOR PLAN 2
Lovenox subq    Screening: pt noted to have Cr-Parkinson White Pattern on EKG, but is asymptomatic with normal HR

## 2019-03-31 NOTE — PROGRESS NOTE ADULT - PROBLEM SELECTOR PLAN 1
C/W ceftazidime as per ID;   Will c/w prednisone 20 mg daily   albuterol nebulizer Q6H, hypersaline 7% BID, pulmozyme BID  chest vest  PT consult  c/w home advair  Previous sputum cx achrombacter sensitive to ceftazidime C/W ceftazidime as per ID;   Will c/w prednisone 20 mg daily   albuterol nebulizer Q6H, hypersaline 7% BID, pulmozyme BID  chest vest  PT consult  c/w home advair  Previous sputum cx achrombacter sensitive to ceftazidime  Will do overnight pulse ox

## 2019-03-31 NOTE — PROGRESS NOTE ADULT - ASSESSMENT
The pt is 44 y/o F with a PMH of CF who presents for monitoring while receiving Avycaz in the setting of multiple drug allergies and concern for cross-reactivity, pending lung transplant eval

## 2019-04-01 LAB
ALBUMIN SERPL ELPH-MCNC: 3.9 G/DL — SIGNIFICANT CHANGE UP (ref 3.3–5)
ALP SERPL-CCNC: 52 U/L — SIGNIFICANT CHANGE UP (ref 40–120)
ALT FLD-CCNC: 10 U/L — SIGNIFICANT CHANGE UP (ref 4–33)
ANION GAP SERPL CALC-SCNC: 13 MMO/L — SIGNIFICANT CHANGE UP (ref 7–14)
AST SERPL-CCNC: 10 U/L — SIGNIFICANT CHANGE UP (ref 4–32)
BASOPHILS # BLD AUTO: 0.08 K/UL — SIGNIFICANT CHANGE UP (ref 0–0.2)
BASOPHILS NFR BLD AUTO: 0.9 % — SIGNIFICANT CHANGE UP (ref 0–2)
BILIRUB SERPL-MCNC: < 0.2 MG/DL — LOW (ref 0.2–1.2)
BUN SERPL-MCNC: 18 MG/DL — SIGNIFICANT CHANGE UP (ref 7–23)
CALCIUM SERPL-MCNC: 9.2 MG/DL — SIGNIFICANT CHANGE UP (ref 8.4–10.5)
CHLORIDE SERPL-SCNC: 103 MMOL/L — SIGNIFICANT CHANGE UP (ref 98–107)
CO2 SERPL-SCNC: 26 MMOL/L — SIGNIFICANT CHANGE UP (ref 22–31)
CREAT SERPL-MCNC: 0.56 MG/DL — SIGNIFICANT CHANGE UP (ref 0.5–1.3)
EOSINOPHIL # BLD AUTO: 0.38 K/UL — SIGNIFICANT CHANGE UP (ref 0–0.5)
EOSINOPHIL NFR BLD AUTO: 4.4 % — SIGNIFICANT CHANGE UP (ref 0–6)
GLUCOSE SERPL-MCNC: 91 MG/DL — SIGNIFICANT CHANGE UP (ref 70–99)
HCT VFR BLD CALC: 34 % — LOW (ref 34.5–45)
HGB BLD-MCNC: 10.5 G/DL — LOW (ref 11.5–15.5)
IMM GRANULOCYTES NFR BLD AUTO: 0.5 % — SIGNIFICANT CHANGE UP (ref 0–1.5)
LYMPHOCYTES # BLD AUTO: 2.79 K/UL — SIGNIFICANT CHANGE UP (ref 1–3.3)
LYMPHOCYTES # BLD AUTO: 32.6 % — SIGNIFICANT CHANGE UP (ref 13–44)
MAGNESIUM SERPL-MCNC: 2 MG/DL — SIGNIFICANT CHANGE UP (ref 1.6–2.6)
MCHC RBC-ENTMCNC: 29.9 PG — SIGNIFICANT CHANGE UP (ref 27–34)
MCHC RBC-ENTMCNC: 30.9 % — LOW (ref 32–36)
MCV RBC AUTO: 96.9 FL — SIGNIFICANT CHANGE UP (ref 80–100)
MONOCYTES # BLD AUTO: 0.96 K/UL — HIGH (ref 0–0.9)
MONOCYTES NFR BLD AUTO: 11.2 % — SIGNIFICANT CHANGE UP (ref 2–14)
NEUTROPHILS # BLD AUTO: 4.31 K/UL — SIGNIFICANT CHANGE UP (ref 1.8–7.4)
NEUTROPHILS NFR BLD AUTO: 50.4 % — SIGNIFICANT CHANGE UP (ref 43–77)
NRBC # FLD: 0 K/UL — SIGNIFICANT CHANGE UP (ref 0–0)
PHOSPHATE SERPL-MCNC: 4.7 MG/DL — HIGH (ref 2.5–4.5)
PLATELET # BLD AUTO: 349 K/UL — SIGNIFICANT CHANGE UP (ref 150–400)
PMV BLD: 9.4 FL — SIGNIFICANT CHANGE UP (ref 7–13)
POTASSIUM SERPL-MCNC: 3.3 MMOL/L — LOW (ref 3.5–5.3)
POTASSIUM SERPL-SCNC: 3.3 MMOL/L — LOW (ref 3.5–5.3)
PROT SERPL-MCNC: 6.6 G/DL — SIGNIFICANT CHANGE UP (ref 6–8.3)
RBC # BLD: 3.51 M/UL — LOW (ref 3.8–5.2)
RBC # FLD: 13.8 % — SIGNIFICANT CHANGE UP (ref 10.3–14.5)
SODIUM SERPL-SCNC: 142 MMOL/L — SIGNIFICANT CHANGE UP (ref 135–145)
WBC # BLD: 8.56 K/UL — SIGNIFICANT CHANGE UP (ref 3.8–10.5)
WBC # FLD AUTO: 8.56 K/UL — SIGNIFICANT CHANGE UP (ref 3.8–10.5)

## 2019-04-01 PROCEDURE — 74220 X-RAY XM ESOPHAGUS 1CNTRST: CPT | Mod: 26

## 2019-04-01 PROCEDURE — 99232 SBSQ HOSP IP/OBS MODERATE 35: CPT

## 2019-04-01 PROCEDURE — 99232 SBSQ HOSP IP/OBS MODERATE 35: CPT | Mod: GC

## 2019-04-01 RX ORDER — POTASSIUM CHLORIDE 20 MEQ
20 PACKET (EA) ORAL
Qty: 0 | Refills: 0 | Status: COMPLETED | OUTPATIENT
Start: 2019-04-01 | End: 2019-04-01

## 2019-04-01 RX ORDER — MONTELUKAST 4 MG/1
10 TABLET, CHEWABLE ORAL DAILY
Qty: 0 | Refills: 0 | Status: DISCONTINUED | OUTPATIENT
Start: 2019-04-01 | End: 2019-04-04

## 2019-04-01 RX ORDER — CHOLECALCIFEROL (VITAMIN D3) 125 MCG
4000 CAPSULE ORAL DAILY
Qty: 0 | Refills: 0 | Status: DISCONTINUED | OUTPATIENT
Start: 2019-04-01 | End: 2019-04-04

## 2019-04-01 RX ADMIN — DORNASE ALFA 2.5 MILLIGRAM(S): 1 SOLUTION RESPIRATORY (INHALATION) at 08:21

## 2019-04-01 RX ADMIN — Medication 20 MILLIGRAM(S): at 06:17

## 2019-04-01 RX ADMIN — SODIUM CHLORIDE 4 MILLILITER(S): 9 INJECTION INTRAMUSCULAR; INTRAVENOUS; SUBCUTANEOUS at 19:15

## 2019-04-01 RX ADMIN — Medication 20 MILLIEQUIVALENT(S): at 12:00

## 2019-04-01 RX ADMIN — Medication 650 MILLIGRAM(S): at 21:20

## 2019-04-01 RX ADMIN — PANTOPRAZOLE SODIUM 40 MILLIGRAM(S): 20 TABLET, DELAYED RELEASE ORAL at 06:17

## 2019-04-01 RX ADMIN — ALBUTEROL 2.5 MILLIGRAM(S): 90 AEROSOL, METERED ORAL at 19:12

## 2019-04-01 RX ADMIN — DORNASE ALFA 2.5 MILLIGRAM(S): 1 SOLUTION RESPIRATORY (INHALATION) at 19:15

## 2019-04-01 RX ADMIN — CEFTAZIDIME 100 GRAM(S): 6 INJECTION, POWDER, FOR SOLUTION INTRAVENOUS at 20:11

## 2019-04-01 RX ADMIN — Medication 20 MILLIEQUIVALENT(S): at 14:01

## 2019-04-01 RX ADMIN — Medication 650 MILLIGRAM(S): at 12:55

## 2019-04-01 RX ADMIN — SODIUM CHLORIDE 4 MILLILITER(S): 9 INJECTION INTRAMUSCULAR; INTRAVENOUS; SUBCUTANEOUS at 08:10

## 2019-04-01 RX ADMIN — Medication 650 MILLIGRAM(S): at 12:21

## 2019-04-01 RX ADMIN — Medication 4000 UNIT(S): at 13:58

## 2019-04-01 RX ADMIN — CEFTAZIDIME 100 GRAM(S): 6 INJECTION, POWDER, FOR SOLUTION INTRAVENOUS at 12:00

## 2019-04-01 RX ADMIN — Medication 20 MILLIEQUIVALENT(S): at 09:02

## 2019-04-01 RX ADMIN — CEFTAZIDIME 100 GRAM(S): 6 INJECTION, POWDER, FOR SOLUTION INTRAVENOUS at 06:17

## 2019-04-01 RX ADMIN — MONTELUKAST 10 MILLIGRAM(S): 4 TABLET, CHEWABLE ORAL at 13:58

## 2019-04-01 RX ADMIN — Medication 650 MILLIGRAM(S): at 20:35

## 2019-04-01 RX ADMIN — ALBUTEROL 2.5 MILLIGRAM(S): 90 AEROSOL, METERED ORAL at 08:01

## 2019-04-01 NOTE — PROGRESS NOTE ADULT - PROBLEM SELECTOR PLAN 1
C/W ceftazidime as per ID;   Will c/w prednisone 20 mg daily   albuterol nebulizer Q6H, hypersaline 7% BID, pulmozyme BID  chest vest  PT consult  c/w home advair  Previous sputum cx achrombacter sensitive to ceftazidime  Will do overnight pulse ox

## 2019-04-01 NOTE — PROGRESS NOTE ADULT - ASSESSMENT
The pt is 42 y/o F with a PMH of CF who presents for monitoring while receiving Avycaz in the setting of multiple drug allergies and concern for cross-reactivity, pending lung transplant eval

## 2019-04-01 NOTE — PROGRESS NOTE ADULT - ASSESSMENT
The pt is 44 y/o F with a PMH of CF who presents for monitoring while receiving antibiotics for her CFexacerbation in the setting of multiple drug allergies and concern for cross-reactivity.   No fever or leukocytosis   MDR Achromobacter infection.   Allergies to multiple abx.   Tolerated ceftazidime so far.    Plan:   c/w Ceftazidime 2 gm IV q8h  No other options except Polymyxin.  Monitor for rash or other reaction  Please start Inhaled polymyxin while inpt.   Rest of the management per pulm.

## 2019-04-01 NOTE — PROGRESS NOTE ADULT - SUBJECTIVE AND OBJECTIVE BOX
43y old  Female who presents with a chief complaint of CF exacerbation (01 Apr 2019 08:10)      Interval history:  Afebrile, chest pressure improved, dyspnea mostly on exertion so can't say if improved in a hospital setting. No rash, tolerating abx well.       Allergies:   adhesives (Rash)  chlorhexidine containing compounds (Rash)  clindamycin (Hives)  fluoroquinolone antibiotics (Unknown)  levofloxacin (Hives)  macrolide antibiotics (Other)  minocycline (Rash)  moxifloxacin (Rash)  penicillin (Hives)  Zithromax Z-Ivan (Hives)      Antimicrobials:   cefTAZidime  IVPB 2 Gram(s) IV Intermittent every 8 hours    REVIEW OF SYSTEMS:  Not much cough, at baseline   No N/V/D, no abdominal pain  No dysuria        Vital Signs Last 24 Hrs  T(C): 36.4 (04-01-19 @ 15:25), Max: 36.6 (03-31-19 @ 21:12)  T(F): 97.6 (04-01-19 @ 15:25), Max: 97.9 (03-31-19 @ 21:12)  HR: 99 (04-01-19 @ 15:25) (82 - 99)  BP: 112/70 (04-01-19 @ 15:25) (108/60 - 112/70)  RR: 18 (04-01-19 @ 15:25) (18 - 18)  SpO2: 96% (04-01-19 @ 15:25) (96% - 98%)    PHYSICAL EXAM:  Patient in no acute distress. AAOX3.  No icterus, no oral ulcers.  Cardiovascular: S1S2 normal.  Lungs: Good air entry B/L lung fields, no wheezing.   Gastrointestinal: soft, nontender, nondistended.  Extremities: no edema.  Rt arm PICC                            10.5   8.56  )-----------( 349      ( 01 Apr 2019 06:15 )             34.0   04-01    142  |  103  |  18  ----------------------------<  91  3.3<L>   |  26  |  0.56    Ca    9.2      01 Apr 2019 06:15  Phos  4.7     04-01  Mg     2.0     04-01    TPro  6.6  /  Alb  3.9  /  TBili  < 0.2<L>  /  DBili  x   /  AST  10  /  ALT  10  /  AlkPhos  52  04-01      LIVER FUNCTIONS - ( 01 Apr 2019 06:15 )  Alb: 3.9 g/dL / Pro: 6.6 g/dL / ALK PHOS: 52 u/L / ALT: 10 u/L / AST: 10 u/L / GGT: x               Culture - Respiratory with Gram Stain (collected 30 Mar 2019 18:44)  Source: SPUTUM  Preliminary Report (01 Apr 2019 10:33):    NRF^Normal Respiratory Jillian    QUANTITY OF GROWTH: MANY        Radiology:  < from: Xray Esophagram (04.01.19 @ 10:20) >  IMPRESSION:     Moderate gastroesophageal reflux. Otherwise normal esophagram.

## 2019-04-01 NOTE — PROGRESS NOTE ADULT - SUBJECTIVE AND OBJECTIVE BOX
Patient is a 43y old  Female who presents with a chief complaint of CF exacerbation (31 Mar 2019 07:19)    SUBJECTIVE / OVERNIGHT EVENTS: No acute events overnight. Reported feeling well in the am. Denied sob, cp, coughing.     MEDICATIONS  (STANDING):  ALBUTerol    0.083% 2.5 milliGRAM(s) Nebulizer two times a day  cefTAZidime  IVPB      cefTAZidime  IVPB 2 Gram(s) IV Intermittent every 8 hours  cholecalciferol 400 Unit(s) Oral daily  dornase diana Solution 2.5 milliGRAM(s) Inhalation two times a day  loratadine 10 milliGRAM(s) Oral daily  pantoprazole    Tablet 40 milliGRAM(s) Oral before breakfast  predniSONE   Tablet 20 milliGRAM(s) Oral daily  sodium chloride 7% Inhalation 4 milliLiter(s) Inhalation two times a day    MEDICATIONS  (PRN):  acetaminophen   Tablet .. 650 milliGRAM(s) Oral every 6 hours PRN Mild Pain (1 - 3), Moderate Pain (4 - 6), Severe Pain (7 - 10)  ALBUTerol    90 MICROgram(s) HFA Inhaler 1 Puff(s) Inhalation four times a day PRN Bronchospasm      CAPILLARY BLOOD GLUCOSE    I&O's Summary      T(C): 36.6 (04-01-19 @ 06:15), Max: 36.6 (03-31-19 @ 21:12)  HR: 84 (04-01-19 @ 08:02) (82 - 98)  BP: 110/63 (04-01-19 @ 06:15) (108/60 - 122/71)  RR: 18 (04-01-19 @ 06:15) (18 - 18)  SpO2: 98% (04-01-19 @ 06:15) (97% - 100%)  PHYSICAL EXAM:  GENERAL: NAD, well-developed  HEAD:  AT, NC  EYES: EOMI, PERRLA, conjunctiva and sclera clear  ENMT: Airway patent. MMM. Good dentition, no lesions.  NECK: Supple, No JVD  CHEST/LUNG: CTABL  HEART: RRR; Normal S1, S2. No murmurs, rubs, or gallops  ABDOMEN: Soft, NT, ND; Bowel sounds present. No organomegaly  EXTREMITIES:  2+ Peripheral Pulses, No clubbing, cyanosis, or edema  PSYCH: AAOx3  NEUROLOGY: non-focal  SKIN: Warm, dry, intact; No rashes or lesions    LABS:    WBC Trend: 9.95<--    Creatinine Trend: 0.52<--

## 2019-04-02 LAB
ALBUMIN SERPL ELPH-MCNC: 3.7 G/DL — SIGNIFICANT CHANGE UP (ref 3.3–5)
ALP SERPL-CCNC: 48 U/L — SIGNIFICANT CHANGE UP (ref 40–120)
ALT FLD-CCNC: 9 U/L — SIGNIFICANT CHANGE UP (ref 4–33)
ANION GAP SERPL CALC-SCNC: 12 MMO/L — SIGNIFICANT CHANGE UP (ref 7–14)
AST SERPL-CCNC: 12 U/L — SIGNIFICANT CHANGE UP (ref 4–32)
BACTERIA SPT RESP CULT: SIGNIFICANT CHANGE UP
BASOPHILS # BLD AUTO: 0.05 K/UL — SIGNIFICANT CHANGE UP (ref 0–0.2)
BASOPHILS NFR BLD AUTO: 0.7 % — SIGNIFICANT CHANGE UP (ref 0–2)
BILIRUB SERPL-MCNC: < 0.2 MG/DL — LOW (ref 0.2–1.2)
BUN SERPL-MCNC: 10 MG/DL — SIGNIFICANT CHANGE UP (ref 7–23)
CALCIUM SERPL-MCNC: 8.4 MG/DL — SIGNIFICANT CHANGE UP (ref 8.4–10.5)
CHLORIDE SERPL-SCNC: 102 MMOL/L — SIGNIFICANT CHANGE UP (ref 98–107)
CO2 SERPL-SCNC: 25 MMOL/L — SIGNIFICANT CHANGE UP (ref 22–31)
CREAT SERPL-MCNC: 0.51 MG/DL — SIGNIFICANT CHANGE UP (ref 0.5–1.3)
EOSINOPHIL # BLD AUTO: 0.44 K/UL — SIGNIFICANT CHANGE UP (ref 0–0.5)
EOSINOPHIL NFR BLD AUTO: 5.9 % — SIGNIFICANT CHANGE UP (ref 0–6)
GLUCOSE SERPL-MCNC: 90 MG/DL — SIGNIFICANT CHANGE UP (ref 70–99)
HCG UR QL: NEGATIVE — SIGNIFICANT CHANGE UP
HCT VFR BLD CALC: 34.7 % — SIGNIFICANT CHANGE UP (ref 34.5–45)
HGB BLD-MCNC: 10.5 G/DL — LOW (ref 11.5–15.5)
IMM GRANULOCYTES NFR BLD AUTO: 0.5 % — SIGNIFICANT CHANGE UP (ref 0–1.5)
LYMPHOCYTES # BLD AUTO: 1.75 K/UL — SIGNIFICANT CHANGE UP (ref 1–3.3)
LYMPHOCYTES # BLD AUTO: 23.4 % — SIGNIFICANT CHANGE UP (ref 13–44)
MAGNESIUM SERPL-MCNC: 2 MG/DL — SIGNIFICANT CHANGE UP (ref 1.6–2.6)
MCHC RBC-ENTMCNC: 29.6 PG — SIGNIFICANT CHANGE UP (ref 27–34)
MCHC RBC-ENTMCNC: 30.3 % — LOW (ref 32–36)
MCV RBC AUTO: 97.7 FL — SIGNIFICANT CHANGE UP (ref 80–100)
MONOCYTES # BLD AUTO: 0.67 K/UL — SIGNIFICANT CHANGE UP (ref 0–0.9)
MONOCYTES NFR BLD AUTO: 9 % — SIGNIFICANT CHANGE UP (ref 2–14)
NEUTROPHILS # BLD AUTO: 4.53 K/UL — SIGNIFICANT CHANGE UP (ref 1.8–7.4)
NEUTROPHILS NFR BLD AUTO: 60.5 % — SIGNIFICANT CHANGE UP (ref 43–77)
NRBC # FLD: 0 K/UL — SIGNIFICANT CHANGE UP (ref 0–0)
PHOSPHATE SERPL-MCNC: 3 MG/DL — SIGNIFICANT CHANGE UP (ref 2.5–4.5)
PLATELET # BLD AUTO: 325 K/UL — SIGNIFICANT CHANGE UP (ref 150–400)
PMV BLD: 8.9 FL — SIGNIFICANT CHANGE UP (ref 7–13)
POTASSIUM SERPL-MCNC: 3.7 MMOL/L — SIGNIFICANT CHANGE UP (ref 3.5–5.3)
POTASSIUM SERPL-SCNC: 3.7 MMOL/L — SIGNIFICANT CHANGE UP (ref 3.5–5.3)
PROT SERPL-MCNC: 6.5 G/DL — SIGNIFICANT CHANGE UP (ref 6–8.3)
RBC # BLD: 3.55 M/UL — LOW (ref 3.8–5.2)
RBC # FLD: 13.4 % — SIGNIFICANT CHANGE UP (ref 10.3–14.5)
SODIUM SERPL-SCNC: 139 MMOL/L — SIGNIFICANT CHANGE UP (ref 135–145)
WBC # BLD: 7.48 K/UL — SIGNIFICANT CHANGE UP (ref 3.8–10.5)
WBC # FLD AUTO: 7.48 K/UL — SIGNIFICANT CHANGE UP (ref 3.8–10.5)

## 2019-04-02 PROCEDURE — 99232 SBSQ HOSP IP/OBS MODERATE 35: CPT | Mod: GC

## 2019-04-02 PROCEDURE — 76937 US GUIDE VASCULAR ACCESS: CPT | Mod: 26

## 2019-04-02 PROCEDURE — 99232 SBSQ HOSP IP/OBS MODERATE 35: CPT

## 2019-04-02 PROCEDURE — 93451 RIGHT HEART CATH: CPT | Mod: 26

## 2019-04-02 RX ADMIN — ALBUTEROL 2.5 MILLIGRAM(S): 90 AEROSOL, METERED ORAL at 09:35

## 2019-04-02 RX ADMIN — Medication 20 MILLIGRAM(S): at 05:04

## 2019-04-02 RX ADMIN — CEFTAZIDIME 100 GRAM(S): 6 INJECTION, POWDER, FOR SOLUTION INTRAVENOUS at 05:04

## 2019-04-02 RX ADMIN — DORNASE ALFA 2.5 MILLIGRAM(S): 1 SOLUTION RESPIRATORY (INHALATION) at 21:40

## 2019-04-02 RX ADMIN — MONTELUKAST 10 MILLIGRAM(S): 4 TABLET, CHEWABLE ORAL at 12:17

## 2019-04-02 RX ADMIN — ALBUTEROL 2.5 MILLIGRAM(S): 90 AEROSOL, METERED ORAL at 21:20

## 2019-04-02 RX ADMIN — SODIUM CHLORIDE 4 MILLILITER(S): 9 INJECTION INTRAMUSCULAR; INTRAVENOUS; SUBCUTANEOUS at 21:30

## 2019-04-02 RX ADMIN — Medication 650 MILLIGRAM(S): at 16:30

## 2019-04-02 RX ADMIN — CEFTAZIDIME 100 GRAM(S): 6 INJECTION, POWDER, FOR SOLUTION INTRAVENOUS at 12:17

## 2019-04-02 RX ADMIN — Medication 650 MILLIGRAM(S): at 15:57

## 2019-04-02 RX ADMIN — Medication 4000 UNIT(S): at 12:17

## 2019-04-02 RX ADMIN — SODIUM CHLORIDE 4 MILLILITER(S): 9 INJECTION INTRAMUSCULAR; INTRAVENOUS; SUBCUTANEOUS at 09:45

## 2019-04-02 RX ADMIN — CEFTAZIDIME 100 GRAM(S): 6 INJECTION, POWDER, FOR SOLUTION INTRAVENOUS at 19:57

## 2019-04-02 RX ADMIN — PANTOPRAZOLE SODIUM 40 MILLIGRAM(S): 20 TABLET, DELAYED RELEASE ORAL at 05:04

## 2019-04-02 RX ADMIN — DORNASE ALFA 2.5 MILLIGRAM(S): 1 SOLUTION RESPIRATORY (INHALATION) at 10:00

## 2019-04-02 NOTE — PROGRESS NOTE ADULT - PROBLEM SELECTOR PLAN 1
C/W ceftazidime as per ID;   Will c/w prednisone 20 mg daily   albuterol nebulizer Q6H, hypersaline 7% BID, pulmozyme BID  chest vest  PT consult  c/w home advair  Previous sputum cx achrombacter sensitive to ceftazidime      V/Q scan:abnormal quantitative lung perfusion scan.  Markedly reduced perfusion in the right and left upper lobes. C/W ceftazidime as per ID;   Will c/w prednisone 20 mg daily   albuterol nebulizer Q6H, hypersaline 7% BID, pulmozyme BID  chest vest  PT consult  c/w home advair  Previous sputum cx achrombacter sensitive to ceftazidime    V/Q scan: abnormal quantitative lung perfusion scan.  Markedly reduced perfusion in the right and left upper lobes.    Barium swallow: Moderate gastroesophageal reflux. Otherwise normal esophagram.

## 2019-04-02 NOTE — PROGRESS NOTE ADULT - ASSESSMENT
The pt is 42 y/o F with a PMH of CF who presents for monitoring while receiving antibiotics for her CF exacerbation in the setting of multiple drug allergies and concern for cross-reactivity.   No fever or leukocytosis   Prior had MDR Achromobacter infection.   Allergies to multiple abx.   Tolerating ceftazidime so far.    Plan:   c/w Ceftazidime 2 gm IV q8h  No other options except Polymyxin.  Monitor for rash or other reaction - so far tolerating ceftazidime  Rest of the management per pulm.

## 2019-04-02 NOTE — PROGRESS NOTE ADULT - SUBJECTIVE AND OBJECTIVE BOX
Patient is a 43y old  Female who presents with a chief complaint of CF exacerbation (02 Apr 2019 11:07)      SUBJECTIVE / OVERNIGHT EVENTS:  No acute events overnight. Pt denies f/c/n/v/d/CP/SOB.       MEDICATIONS  (STANDING):  ALBUTerol    0.083% 2.5 milliGRAM(s) Nebulizer two times a day  cefTAZidime  IVPB      cefTAZidime  IVPB 2 Gram(s) IV Intermittent every 8 hours  cholecalciferol 4000 Unit(s) Oral daily  dornase diana Solution 2.5 milliGRAM(s) Inhalation two times a day  loratadine 10 milliGRAM(s) Oral daily  montelukast 10 milliGRAM(s) Oral daily  pantoprazole    Tablet 40 milliGRAM(s) Oral before breakfast  predniSONE   Tablet 20 milliGRAM(s) Oral daily  sodium chloride 7% Inhalation 4 milliLiter(s) Inhalation two times a day    MEDICATIONS  (PRN):  acetaminophen   Tablet .. 650 milliGRAM(s) Oral every 6 hours PRN Mild Pain (1 - 3), Moderate Pain (4 - 6), Severe Pain (7 - 10)  ALBUTerol    90 MICROgram(s) HFA Inhaler 1 Puff(s) Inhalation four times a day PRN Bronchospasm      Vital Signs Last 24 Hrs  T(C): 36.4 (02 Apr 2019 04:59), Max: 36.6 (01 Apr 2019 20:11)  T(F): 97.5 (02 Apr 2019 04:59), Max: 97.9 (01 Apr 2019 20:11)  HR: 78 (02 Apr 2019 09:35) (78 - 99)  BP: 106/63 (02 Apr 2019 04:59) (106/63 - 123/61)  BP(mean): --  RR: 17 (02 Apr 2019 04:59) (17 - 18)  SpO2: 100% (02 Apr 2019 09:35) (96% - 100%)    CAPILLARY BLOOD GLUCOSE        I&O's Summary      PHYSICAL EXAM:  GENERAL: Comfortable, no acute distress   	HEAD:  Normocephalic, atraumatic  	HEENT: Moist mucous membranes  	NECK: Supple, No JVD  	NERVOUS SYSTEM:  Alert & Oriented X3  	CHEST/LUNG: diffuse rhonchi b/l  	HEART: Regular rate and rhythm, no murmur   	ABDOMEN: Soft, Nontender, Nondistended, Bowel sounds present  	EXTREMITIES:   No clubbing, cyanosis, or edema  	MUSCULOSKELTAL- No muscle tenderness, no joint tenderness  SKIN- warm and dry, no rash      LABS:                        10.5   7.48  )-----------( 325      ( 02 Apr 2019 07:30 )             34.7     Auto Eosinophil # 0.44  / Auto Eosinophil % 5.9   / Auto Neutrophil # 4.53  / Auto Neutrophil % 60.5  / BANDS % x                            10.5   8.56  )-----------( 349      ( 01 Apr 2019 06:15 )             34.0     Auto Eosinophil # 0.38  / Auto Eosinophil % 4.4   / Auto Neutrophil # 4.31  / Auto Neutrophil % 50.4  / BANDS % x        04-02    139  |  102  |  10  ----------------------------<  90  3.7   |  25  |  0.51  04-01    142  |  103  |  18  ----------------------------<  91  3.3<L>   |  26  |  0.56    Ca    8.4      02 Apr 2019 07:30  Mg     2.0     04-02  Phos  3.0     04-02  TPro  6.5  /  Alb  3.7  /  TBili  < 0.2<L>  /  DBili  x   /  AST  12  /  ALT  9   /  AlkPhos  48  04-02  TPro  6.6  /  Alb  3.9  /  TBili  < 0.2<L>  /  DBili  x   /  AST  10  /  ALT  10  /  AlkPhos  52  04-01            Lactate, Blood: 1.7 mmol/L (03-29 @ 15:45)      RESPIRATORY  VENT:    ABG:     VBG:     RADIOLOGY & ADDITIONAL TESTS:  (Imaging Personally Reviewed)NA    Consultant(s) Notes Reviewed:  NA    Care Discussed with Consultants/Other Providers: pulm

## 2019-04-02 NOTE — PROGRESS NOTE ADULT - SUBJECTIVE AND OBJECTIVE BOX
CC: Patient is a 43y old  Female who presents with a chief complaint of CF exacerbation (02 Apr 2019 11:07)    ID following for CF exacerbation    Interval History/ROS: Patient feeling better today. No fevers, no chills.    Rest of ROS negative.    Allergies  adhesives (Rash)  chlorhexidine containing compounds (Rash)  clindamycin (Hives)  fluoroquinolone antibiotics (Unknown)  levofloxacin (Hives)  macrolide antibiotics (Other)  minocycline (Rash)  moxifloxacin (Rash)  penicillin (Hives)  Zithromax Z-Ivan (Hives)    ANTIMICROBIALS:  cefTAZidime  IVPB    cefTAZidime  IVPB 2 every 8 hours    OTHER MEDS:  acetaminophen   Tablet .. 650 milliGRAM(s) Oral every 6 hours PRN  ALBUTerol    0.083% 2.5 milliGRAM(s) Nebulizer two times a day  ALBUTerol    90 MICROgram(s) HFA Inhaler 1 Puff(s) Inhalation four times a day PRN  cholecalciferol 4000 Unit(s) Oral daily  dornase diana Solution 2.5 milliGRAM(s) Inhalation two times a day  loratadine 10 milliGRAM(s) Oral daily  montelukast 10 milliGRAM(s) Oral daily  pantoprazole    Tablet 40 milliGRAM(s) Oral before breakfast  predniSONE   Tablet 20 milliGRAM(s) Oral daily  sodium chloride 7% Inhalation 4 milliLiter(s) Inhalation two times a day    PE:    Vital Signs Last 24 Hrs  T(C): 36.3 (02 Apr 2019 12:18), Max: 36.6 (01 Apr 2019 20:11)  T(F): 97.4 (02 Apr 2019 12:18), Max: 97.9 (01 Apr 2019 20:11)  HR: 100 (02 Apr 2019 12:18) (78 - 100)  BP: 127/69 (02 Apr 2019 12:18) (106/63 - 127/69)  BP(mean): --  RR: 20 (02 Apr 2019 12:18) (17 - 20)  SpO2: 99% (02 Apr 2019 12:18) (96% - 100%)    Gen: AOx3, NAD, non-toxic, pleasant  CV: S1+S2 normal, no murmurs  Resp: Clear bilat, no resp distress  Abd: Soft, nontender, +BS  Ext: No LE edema, no wounds  : No Jimenez  IV/Skin: No thrombophlebitis, right arm picc  Neuro: no focal deficits    LABS:                          10.5   7.48  )-----------( 325      ( 02 Apr 2019 07:30 )             34.7       04-02    139  |  102  |  10  ----------------------------<  90  3.7   |  25  |  0.51    Ca    8.4      02 Apr 2019 07:30  Phos  3.0     04-02  Mg     2.0     04-02    TPro  6.5  /  Alb  3.7  /  TBili  < 0.2<L>  /  DBili  x   /  AST  12  /  ALT  9   /  AlkPhos  48  04-02    MICROBIOLOGY:  v  SPUTUM  03-30-19 --  --  --    BLOOD  03-29-19 --  --  --    RADIOLOGY:    < from: Xray Esophagram (04.01.19 @ 10:20) >  IMPRESSION:     Moderate gastroesophageal reflux. Otherwise normal esophagram.        < end of copied text >

## 2019-04-03 ENCOUNTER — TRANSCRIPTION ENCOUNTER (OUTPATIENT)
Age: 44
End: 2019-04-03

## 2019-04-03 LAB
BACTERIA BLD CULT: SIGNIFICANT CHANGE UP
BACTERIA SPT CF RESP CULT: ABNORMAL

## 2019-04-03 PROCEDURE — 99232 SBSQ HOSP IP/OBS MODERATE 35: CPT

## 2019-04-03 PROCEDURE — 93306 TTE W/DOPPLER COMPLETE: CPT | Mod: 26

## 2019-04-03 PROCEDURE — 99232 SBSQ HOSP IP/OBS MODERATE 35: CPT | Mod: GC

## 2019-04-03 RX ORDER — CEFTAZIDIME 6 G/30ML
2 INJECTION, POWDER, FOR SOLUTION INTRAVENOUS
Qty: 1 | Refills: 0
Start: 2019-04-03 | End: 2019-04-13

## 2019-04-03 RX ORDER — CEFTAZIDIME 6 G/30ML
2 INJECTION, POWDER, FOR SOLUTION INTRAVENOUS
Qty: 1 | Refills: 0 | OUTPATIENT
Start: 2019-04-03 | End: 2019-04-13

## 2019-04-03 RX ADMIN — ALBUTEROL 2.5 MILLIGRAM(S): 90 AEROSOL, METERED ORAL at 21:42

## 2019-04-03 RX ADMIN — CEFTAZIDIME 100 GRAM(S): 6 INJECTION, POWDER, FOR SOLUTION INTRAVENOUS at 12:37

## 2019-04-03 RX ADMIN — SODIUM CHLORIDE 4 MILLILITER(S): 9 INJECTION INTRAMUSCULAR; INTRAVENOUS; SUBCUTANEOUS at 21:48

## 2019-04-03 RX ADMIN — DORNASE ALFA 2.5 MILLIGRAM(S): 1 SOLUTION RESPIRATORY (INHALATION) at 08:45

## 2019-04-03 RX ADMIN — SODIUM CHLORIDE 4 MILLILITER(S): 9 INJECTION INTRAMUSCULAR; INTRAVENOUS; SUBCUTANEOUS at 09:10

## 2019-04-03 RX ADMIN — MONTELUKAST 10 MILLIGRAM(S): 4 TABLET, CHEWABLE ORAL at 12:36

## 2019-04-03 RX ADMIN — PANTOPRAZOLE SODIUM 40 MILLIGRAM(S): 20 TABLET, DELAYED RELEASE ORAL at 05:00

## 2019-04-03 RX ADMIN — CEFTAZIDIME 100 GRAM(S): 6 INJECTION, POWDER, FOR SOLUTION INTRAVENOUS at 19:51

## 2019-04-03 RX ADMIN — DORNASE ALFA 2.5 MILLIGRAM(S): 1 SOLUTION RESPIRATORY (INHALATION) at 21:52

## 2019-04-03 RX ADMIN — CEFTAZIDIME 100 GRAM(S): 6 INJECTION, POWDER, FOR SOLUTION INTRAVENOUS at 05:00

## 2019-04-03 RX ADMIN — ALBUTEROL 2.5 MILLIGRAM(S): 90 AEROSOL, METERED ORAL at 09:12

## 2019-04-03 RX ADMIN — Medication 20 MILLIGRAM(S): at 05:00

## 2019-04-03 RX ADMIN — Medication 4000 UNIT(S): at 12:36

## 2019-04-03 RX ADMIN — LORATADINE 10 MILLIGRAM(S): 10 TABLET ORAL at 12:36

## 2019-04-03 NOTE — PROGRESS NOTE ADULT - SUBJECTIVE AND OBJECTIVE BOX
Patient is a 43y old  Female who presents with a chief complaint of CF exacerbation (03 Apr 2019 12:31)      SUBJECTIVE / OVERNIGHT EVENTS:  No acute events overnight. Pt denies SOB/CP/persistent cough/f/v or other issues.      MEDICATIONS  (STANDING):  ALBUTerol    0.083% 2.5 milliGRAM(s) Nebulizer two times a day  cefTAZidime  IVPB      cefTAZidime  IVPB 2 Gram(s) IV Intermittent every 8 hours  cholecalciferol 4000 Unit(s) Oral daily  dornase diana Solution 2.5 milliGRAM(s) Inhalation two times a day  loratadine 10 milliGRAM(s) Oral daily  montelukast 10 milliGRAM(s) Oral daily  pantoprazole    Tablet 40 milliGRAM(s) Oral before breakfast  predniSONE   Tablet 20 milliGRAM(s) Oral daily  sodium chloride 7% Inhalation 4 milliLiter(s) Inhalation two times a day    MEDICATIONS  (PRN):  acetaminophen   Tablet .. 650 milliGRAM(s) Oral every 6 hours PRN Mild Pain (1 - 3), Moderate Pain (4 - 6), Severe Pain (7 - 10)  ALBUTerol    90 MICROgram(s) HFA Inhaler 1 Puff(s) Inhalation four times a day PRN Bronchospasm      Vital Signs Last 24 Hrs  T(C): 36.4 (03 Apr 2019 12:43), Max: 36.6 (03 Apr 2019 04:56)  T(F): 97.5 (03 Apr 2019 12:43), Max: 97.8 (03 Apr 2019 04:56)  HR: 92 (03 Apr 2019 12:43) (78 - 92)  BP: 108/68 (03 Apr 2019 12:43) (108/68 - 116/61)  BP(mean): --  RR: 18 (03 Apr 2019 12:43) (18 - 19)  SpO2: 95% (03 Apr 2019 12:43) (95% - 99%)    CAPILLARY BLOOD GLUCOSE        I&O's Summary      PHYSICAL EXAM:  GENERAL: Comfortable, no acute distress   	HEAD:  Normocephalic, atraumatic  	HEENT: Moist mucous membranes  	NECK: Supple, No JVD  	NERVOUS SYSTEM:  Alert & Oriented X3  	CHEST/LUNG: diffuse rhonchi b/l  	HEART: Regular rate and rhythm, no murmur   	ABDOMEN: Soft, Nontender, Nondistended, Bowel sounds present  	EXTREMITIES:   No clubbing, cyanosis, or edema  	MUSCULOSKELTAL- No muscle tenderness, no joint tenderness  SKIN- warm and dry, no rash    LABS:                        10.5   7.48  )-----------( 325      ( 02 Apr 2019 07:30 )             34.7     Auto Eosinophil # 0.44  / Auto Eosinophil % 5.9   / Auto Neutrophil # 4.53  / Auto Neutrophil % 60.5  / BANDS % x        04-02    139  |  102  |  10  ----------------------------<  90  3.7   |  25  |  0.51    Ca    8.4      02 Apr 2019 07:30  Mg     2.0     04-02  Phos  3.0     04-02  TPro  6.5  /  Alb  3.7  /  TBili  < 0.2<L>  /  DBili  x   /  AST  12  /  ALT  9   /  AlkPhos  48  04-02            Lactate, Blood: 1.7 mmol/L (03-29 @ 15:45)      RESPIRATORY  VENT:    ABG:     VBG:     RADIOLOGY & ADDITIONAL TESTS:  (Imaging Personally Reviewed): OC    Consultant(s) Notes Reviewed:  OC    Care Discussed with Consultants/Other Providers: OC

## 2019-04-03 NOTE — DISCHARGE NOTE PROVIDER - HOSPITAL COURSE
HPI:     The pt is 42 y/o F with a PMH of CF who presents for monitoring while receiving Avycaz in the setting of multiple drug allergies and concern for cross-reactivity. She reports feeling SOB since February with prolonged exertion, such as walking long distances, though she is not SOB at baseline. She has been taking ciprofloxacin and has been on prednisone 20 mg daily, which has been helping her to resolve these symptoms. She reports occasional cough when she receives chest PT, but denies cough at baseline. She also complains of some occasional chest tightness which she attributes to inflammation of her lungs. After admission, patient was treated with IV ceftazidime and continued with steroids prednisone 20mg daily. Patient remained to accept albuterol nebulizer Q6H, hypersaline 7% BID, pulmozyme BID and chest vest. Patient sx continue to improve. RHC and fluroscopy sniff tests were performed during this admission in preparation for lung transplantation in the future. On 4/4, patient resp status stable and decision made to discharge patient to home and c/w abx with end date 4/15.

## 2019-04-03 NOTE — DISCHARGE NOTE PROVIDER - CARE PROVIDER_API CALL
Madisyn Hollis)  Critical Care Medicine; Internal Medicine; Pulmonary Disease; Sleep Medicine  [address] [place] [address]  Phone: [phone]  Fax: [phone]  Follow Up Time:

## 2019-04-03 NOTE — PROGRESS NOTE ADULT - ASSESSMENT
The pt is 44 y/o F with a PMH of CF who presents for monitoring while receiving antibiotics for her CF exacerbation in the setting of multiple drug allergies and concern for cross-reactivity.   No fever or leukocytosis   Prior had MDR Achromobacter infection.   Allergies to multiple abx.   Tolerating ceftazidime so far.  Clinically improving    Plan:   c/w Ceftazidime 2 gm IV q8h  Monitor for rash or other reaction - so far tolerating ceftazidime  Rest of the management per pulm.

## 2019-04-03 NOTE — DISCHARGE NOTE PROVIDER - NSDCCPCAREPLAN_GEN_ALL_CORE_FT
PRINCIPAL DISCHARGE DIAGNOSIS  Diagnosis: CF (cystic fibrosis)  Assessment and Plan of Treatment: You were treatecd for CF exacerbation during this abx. Your breathing status is stable at the time of discharge. Please f/u with your pulmonologist Dr. Madisyn Hollis in 1-2 weeks after discharge.

## 2019-04-03 NOTE — PROGRESS NOTE ADULT - PROBLEM SELECTOR PLAN 1
C/W ceftazidime as per ID;   Will c/w prednisone 20 mg daily   albuterol nebulizer Q6H, hypersaline 7% BID, pulmozyme BID  chest vest  c/w home advair  Previous sputum cx achrombacter sensitive to ceftazidime    V/Q scan: abnormal quantitative lung perfusion scan.  Markedly reduced perfusion in the right and left upper lobes.    Barium swallow: Moderate gastroesophageal reflux. Otherwise normal esophagram.    pt s/p RHC as part of pre-op planning; pending ECHO/sniff test

## 2019-04-03 NOTE — PROGRESS NOTE ADULT - SUBJECTIVE AND OBJECTIVE BOX
CC: Patient is a 43y old  Female who presents with a chief complaint of CF exacerbation (03 Apr 2019 06:41)    ID following for CF exacerbation    Interval History/ROS: Patient feeling much better. SOB improved. No fevers. no chills.    Rest of ROS negative.    Allergies  adhesives (Rash)  chlorhexidine containing compounds (Rash)  clindamycin (Hives)  fluoroquinolone antibiotics (Unknown)  levofloxacin (Hives)  macrolide antibiotics (Other)  minocycline (Rash)  moxifloxacin (Rash)  penicillin (Hives)  Zithromax Z-Ivan (Hives)    ANTIMICROBIALS:  cefTAZidime  IVPB    cefTAZidime  IVPB 2 every 8 hours    OTHER MEDS:  acetaminophen   Tablet .. 650 milliGRAM(s) Oral every 6 hours PRN  ALBUTerol    0.083% 2.5 milliGRAM(s) Nebulizer two times a day  ALBUTerol    90 MICROgram(s) HFA Inhaler 1 Puff(s) Inhalation four times a day PRN  cholecalciferol 4000 Unit(s) Oral daily  dornase diana Solution 2.5 milliGRAM(s) Inhalation two times a day  loratadine 10 milliGRAM(s) Oral daily  montelukast 10 milliGRAM(s) Oral daily  pantoprazole    Tablet 40 milliGRAM(s) Oral before breakfast  predniSONE   Tablet 20 milliGRAM(s) Oral daily  sodium chloride 7% Inhalation 4 milliLiter(s) Inhalation two times a day    PE:    Vital Signs Last 24 Hrs  T(C): 36.6 (03 Apr 2019 04:56), Max: 36.6 (03 Apr 2019 04:56)  T(F): 97.8 (03 Apr 2019 04:56), Max: 97.8 (03 Apr 2019 04:56)  HR: 89 (03 Apr 2019 08:45) (78 - 90)  BP: 116/61 (03 Apr 2019 04:56) (109/66 - 116/61)  BP(mean): --  RR: 18 (03 Apr 2019 04:56) (18 - 19)  SpO2: 98% (03 Apr 2019 08:45) (97% - 99%)    Gen: AOx3, NAD, non-toxic, pleasant  CV: S1+S2 normal, no murmurs  Resp: Clear bilat, no resp distress  Abd: Soft, nontender, +BS  Ext: No LE edema, no wounds  : No Jimenez  IV/Skin: No thrombophlebitis  Neuro: no focal deficits    LABS:                          10.5   7.48  )-----------( 325      ( 02 Apr 2019 07:30 )             34.7       04-02    139  |  102  |  10  ----------------------------<  90  3.7   |  25  |  0.51    Ca    8.4      02 Apr 2019 07:30  Phos  3.0     04-02  Mg     2.0     04-02    TPro  6.5  /  Alb  3.7  /  TBili  < 0.2<L>  /  DBili  x   /  AST  12  /  ALT  9   /  AlkPhos  48  04-02    MICROBIOLOGY:  v  SPUTUM  03-30-19 --  --  --      BLOOD  03-29-19 --  --  --    RADIOLOGY:    < from: Xray Esophagram (04.01.19 @ 10:20) >  IMPRESSION:     Moderate gastroesophageal reflux. Otherwise normal esophagram.      < end of copied text >

## 2019-04-04 ENCOUNTER — TRANSCRIPTION ENCOUNTER (OUTPATIENT)
Age: 44
End: 2019-04-04

## 2019-04-04 VITALS
TEMPERATURE: 98 F | RESPIRATION RATE: 18 BRPM | HEART RATE: 85 BPM | DIASTOLIC BLOOD PRESSURE: 60 MMHG | SYSTOLIC BLOOD PRESSURE: 124 MMHG | OXYGEN SATURATION: 100 %

## 2019-04-04 LAB
ALBUMIN SERPL ELPH-MCNC: 3.9 G/DL — SIGNIFICANT CHANGE UP (ref 3.3–5)
ALP SERPL-CCNC: 51 U/L — SIGNIFICANT CHANGE UP (ref 40–120)
ALT FLD-CCNC: 14 U/L — SIGNIFICANT CHANGE UP (ref 4–33)
ANION GAP SERPL CALC-SCNC: 15 MMO/L — HIGH (ref 7–14)
AST SERPL-CCNC: 13 U/L — SIGNIFICANT CHANGE UP (ref 4–32)
BASE EXCESS BLDA CALC-SCNC: 3 MMOL/L — SIGNIFICANT CHANGE UP
BILIRUB SERPL-MCNC: < 0.2 MG/DL — LOW (ref 0.2–1.2)
BUN SERPL-MCNC: 16 MG/DL — SIGNIFICANT CHANGE UP (ref 7–23)
CALCIUM SERPL-MCNC: 8.9 MG/DL — SIGNIFICANT CHANGE UP (ref 8.4–10.5)
CHLORIDE BLDA-SCNC: 105 MMOL/L — SIGNIFICANT CHANGE UP (ref 96–108)
CHLORIDE SERPL-SCNC: 102 MMOL/L — SIGNIFICANT CHANGE UP (ref 98–107)
CO2 SERPL-SCNC: 24 MMOL/L — SIGNIFICANT CHANGE UP (ref 22–31)
CREAT SERPL-MCNC: 0.54 MG/DL — SIGNIFICANT CHANGE UP (ref 0.5–1.3)
GLUCOSE BLDA-MCNC: 122 MG/DL — HIGH (ref 70–99)
GLUCOSE SERPL-MCNC: 86 MG/DL — SIGNIFICANT CHANGE UP (ref 70–99)
HCO3 BLDA-SCNC: 27 MMOL/L — HIGH (ref 22–26)
HCT VFR BLD CALC: 34.1 % — LOW (ref 34.5–45)
HCT VFR BLDA CALC: 35.7 % — SIGNIFICANT CHANGE UP (ref 34.5–46.5)
HGB BLD-MCNC: 10.6 G/DL — LOW (ref 11.5–15.5)
HGB BLDA-MCNC: 11.6 G/DL — SIGNIFICANT CHANGE UP (ref 11.5–15.5)
LACTATE BLDA-SCNC: 1.2 MMOL/L — SIGNIFICANT CHANGE UP (ref 0.5–2)
MAGNESIUM SERPL-MCNC: 2 MG/DL — SIGNIFICANT CHANGE UP (ref 1.6–2.6)
MCHC RBC-ENTMCNC: 29.9 PG — SIGNIFICANT CHANGE UP (ref 27–34)
MCHC RBC-ENTMCNC: 31.1 % — LOW (ref 32–36)
MCV RBC AUTO: 96.1 FL — SIGNIFICANT CHANGE UP (ref 80–100)
NRBC # FLD: 0 K/UL — SIGNIFICANT CHANGE UP (ref 0–0)
PCO2 BLDA: 39 MMHG — SIGNIFICANT CHANGE UP (ref 32–48)
PH BLDA: 7.45 PH — SIGNIFICANT CHANGE UP (ref 7.35–7.45)
PHOSPHATE SERPL-MCNC: 3.7 MG/DL — SIGNIFICANT CHANGE UP (ref 2.5–4.5)
PLATELET # BLD AUTO: 353 K/UL — SIGNIFICANT CHANGE UP (ref 150–400)
PMV BLD: 9.2 FL — SIGNIFICANT CHANGE UP (ref 7–13)
PO2 BLDA: 89 MMHG — SIGNIFICANT CHANGE UP (ref 83–108)
POTASSIUM BLDA-SCNC: 4.1 MMOL/L — SIGNIFICANT CHANGE UP (ref 3.4–4.5)
POTASSIUM SERPL-MCNC: 4.1 MMOL/L — SIGNIFICANT CHANGE UP (ref 3.5–5.3)
POTASSIUM SERPL-SCNC: 4.1 MMOL/L — SIGNIFICANT CHANGE UP (ref 3.5–5.3)
PROT SERPL-MCNC: 6.7 G/DL — SIGNIFICANT CHANGE UP (ref 6–8.3)
RBC # BLD: 3.55 M/UL — LOW (ref 3.8–5.2)
RBC # FLD: 13.7 % — SIGNIFICANT CHANGE UP (ref 10.3–14.5)
SAO2 % BLDA: 96.8 % — SIGNIFICANT CHANGE UP (ref 95–99)
SODIUM BLDA-SCNC: 134 MMOL/L — LOW (ref 136–146)
SODIUM SERPL-SCNC: 141 MMOL/L — SIGNIFICANT CHANGE UP (ref 135–145)
WBC # BLD: 10.21 K/UL — SIGNIFICANT CHANGE UP (ref 3.8–10.5)
WBC # FLD AUTO: 10.21 K/UL — SIGNIFICANT CHANGE UP (ref 3.8–10.5)

## 2019-04-04 PROCEDURE — 99238 HOSP IP/OBS DSCHRG MGMT 30/<: CPT

## 2019-04-04 PROCEDURE — 99232 SBSQ HOSP IP/OBS MODERATE 35: CPT

## 2019-04-04 PROCEDURE — 76000 FLUOROSCOPY <1 HR PHYS/QHP: CPT | Mod: 26

## 2019-04-04 RX ORDER — CEFTAZIDIME 6 G/30ML
2 INJECTION, POWDER, FOR SOLUTION INTRAVENOUS
Qty: 1 | Refills: 0
Start: 2019-04-04 | End: 2019-04-14

## 2019-04-04 RX ORDER — EPINEPHRINE 0.3 MG/.3ML
0.3 INJECTION INTRAMUSCULAR; SUBCUTANEOUS
Qty: 0 | Refills: 0 | COMMUNITY

## 2019-04-04 RX ORDER — EPINEPHRINE 0.3 MG/.3ML
1 INJECTION INTRAMUSCULAR; SUBCUTANEOUS
Qty: 0 | Refills: 0 | DISCHARGE
Start: 2019-04-04

## 2019-04-04 RX ORDER — CEFTAZIDIME 6 G/30ML
2 INJECTION, POWDER, FOR SOLUTION INTRAVENOUS
Qty: 1 | Refills: 0 | OUTPATIENT
Start: 2019-04-04 | End: 2019-04-13

## 2019-04-04 RX ORDER — DORNASE ALFA 1 MG/ML
2.5 SOLUTION RESPIRATORY (INHALATION)
Qty: 0 | Refills: 0 | DISCHARGE
Start: 2019-04-04

## 2019-04-04 RX ORDER — AZELASTINE HYDROCHLORIDE AND FLUTICASONE PROPIONATE 137; 50 UG/1; UG/1
1 SPRAY, METERED NASAL
Qty: 0 | Refills: 0 | COMMUNITY

## 2019-04-04 RX ORDER — MONTELUKAST 4 MG/1
1 TABLET, CHEWABLE ORAL
Qty: 0 | Refills: 0 | COMMUNITY

## 2019-04-04 RX ORDER — SODIUM CHLORIDE 9 MG/ML
1 INJECTION INTRAMUSCULAR; INTRAVENOUS; SUBCUTANEOUS
Qty: 0 | Refills: 0 | COMMUNITY

## 2019-04-04 RX ORDER — SODIUM CHLORIDE 9 MG/ML
4 INJECTION INTRAMUSCULAR; INTRAVENOUS; SUBCUTANEOUS
Qty: 0 | Refills: 0 | DISCHARGE
Start: 2019-04-04

## 2019-04-04 RX ORDER — ALBUTEROL 90 UG/1
3 AEROSOL, METERED ORAL
Qty: 0 | Refills: 0 | COMMUNITY

## 2019-04-04 RX ORDER — CEFTAZIDIME 6 G/30ML
2 INJECTION, POWDER, FOR SOLUTION INTRAVENOUS
Qty: 1 | Refills: 0 | OUTPATIENT
Start: 2019-04-04 | End: 2019-04-14

## 2019-04-04 RX ORDER — BENZOYL PEROXIDE MICRONIZED 5.8 %
1 TOWELETTE (EA) TOPICAL
Qty: 0 | Refills: 0 | COMMUNITY

## 2019-04-04 RX ORDER — CETIRIZINE HYDROCHLORIDE 10 MG/1
1 TABLET ORAL
Qty: 0 | Refills: 0 | DISCHARGE
Start: 2019-04-04

## 2019-04-04 RX ORDER — TEZACAFTOR AND IVACAFTOR 50 MG-75MG
1 KIT ORAL
Qty: 0 | Refills: 0 | DISCHARGE
Start: 2019-04-04

## 2019-04-04 RX ORDER — ALBUTEROL 90 UG/1
2 AEROSOL, METERED ORAL
Qty: 0 | Refills: 0 | COMMUNITY

## 2019-04-04 RX ORDER — FAMOTIDINE 10 MG/ML
1 INJECTION INTRAVENOUS
Qty: 0 | Refills: 0 | COMMUNITY

## 2019-04-04 RX ORDER — CETIRIZINE HYDROCHLORIDE 10 MG/1
1 TABLET ORAL
Qty: 0 | Refills: 0 | COMMUNITY

## 2019-04-04 RX ORDER — FAMOTIDINE 10 MG/ML
1 INJECTION INTRAVENOUS
Qty: 0 | Refills: 0 | DISCHARGE
Start: 2019-04-04

## 2019-04-04 RX ORDER — FLUTICASONE PROPIONATE AND SALMETEROL 50; 250 UG/1; UG/1
1 POWDER ORAL; RESPIRATORY (INHALATION)
Qty: 0 | Refills: 0 | COMMUNITY

## 2019-04-04 RX ORDER — BENZOYL PEROXIDE MICRONIZED 5.8 %
1 TOWELETTE (EA) TOPICAL
Qty: 0 | Refills: 0 | DISCHARGE
Start: 2019-04-04

## 2019-04-04 RX ORDER — CHOLECALCIFEROL (VITAMIN D3) 125 MCG
2 CAPSULE ORAL
Qty: 0 | Refills: 0 | COMMUNITY

## 2019-04-04 RX ORDER — PANTOPRAZOLE SODIUM 20 MG/1
1 TABLET, DELAYED RELEASE ORAL
Qty: 0 | Refills: 0 | DISCHARGE
Start: 2019-04-04

## 2019-04-04 RX ORDER — PANTOPRAZOLE SODIUM 20 MG/1
1 TABLET, DELAYED RELEASE ORAL
Qty: 0 | Refills: 0 | COMMUNITY

## 2019-04-04 RX ORDER — CEFTAZIDIME 6 G/30ML
2 INJECTION, POWDER, FOR SOLUTION INTRAVENOUS
Qty: 0 | Refills: 0 | DISCHARGE
Start: 2019-04-04 | End: 2019-04-15

## 2019-04-04 RX ORDER — TEZACAFTOR AND IVACAFTOR 50 MG-75MG
0 KIT ORAL
Qty: 0 | Refills: 0 | COMMUNITY

## 2019-04-04 RX ORDER — DORNASE ALFA 1 MG/ML
2.5 SOLUTION RESPIRATORY (INHALATION)
Qty: 0 | Refills: 0 | COMMUNITY

## 2019-04-04 RX ORDER — CEFTAZIDIME 6 G/30ML
2 INJECTION, POWDER, FOR SOLUTION INTRAVENOUS
Qty: 11 | Refills: 0 | OUTPATIENT
Start: 2019-04-04 | End: 2019-04-14

## 2019-04-04 RX ORDER — FLUTICASONE PROPIONATE AND SALMETEROL 50; 250 UG/1; UG/1
1 POWDER ORAL; RESPIRATORY (INHALATION)
Qty: 0 | Refills: 0 | DISCHARGE
Start: 2019-04-04

## 2019-04-04 RX ORDER — ALBUTEROL 90 UG/1
1 AEROSOL, METERED ORAL
Qty: 0 | Refills: 0 | DISCHARGE
Start: 2019-04-04

## 2019-04-04 RX ORDER — AZELASTINE HYDROCHLORIDE AND FLUTICASONE PROPIONATE 137; 50 UG/1; UG/1
1 SPRAY, METERED NASAL
Qty: 0 | Refills: 0 | DISCHARGE
Start: 2019-04-04

## 2019-04-04 RX ADMIN — ALBUTEROL 2.5 MILLIGRAM(S): 90 AEROSOL, METERED ORAL at 10:28

## 2019-04-04 RX ADMIN — Medication 4000 UNIT(S): at 12:15

## 2019-04-04 RX ADMIN — CEFTAZIDIME 100 GRAM(S): 6 INJECTION, POWDER, FOR SOLUTION INTRAVENOUS at 12:15

## 2019-04-04 RX ADMIN — MONTELUKAST 10 MILLIGRAM(S): 4 TABLET, CHEWABLE ORAL at 12:15

## 2019-04-04 RX ADMIN — DORNASE ALFA 2.5 MILLIGRAM(S): 1 SOLUTION RESPIRATORY (INHALATION) at 10:28

## 2019-04-04 RX ADMIN — Medication 20 MILLIGRAM(S): at 05:02

## 2019-04-04 RX ADMIN — PANTOPRAZOLE SODIUM 40 MILLIGRAM(S): 20 TABLET, DELAYED RELEASE ORAL at 05:02

## 2019-04-04 RX ADMIN — CEFTAZIDIME 100 GRAM(S): 6 INJECTION, POWDER, FOR SOLUTION INTRAVENOUS at 05:01

## 2019-04-04 RX ADMIN — SODIUM CHLORIDE 4 MILLILITER(S): 9 INJECTION INTRAMUSCULAR; INTRAVENOUS; SUBCUTANEOUS at 10:29

## 2019-04-04 NOTE — PROGRESS NOTE ADULT - REASON FOR ADMISSION
CF exacerbation

## 2019-04-04 NOTE — PROGRESS NOTE ADULT - PROBLEM SELECTOR PLAN 1
C/W ceftazidime as per ID;   Will c/w prednisone 20 mg daily   albuterol nebulizer Q6H, hypersaline 7% BID, pulmozyme BID  chest vest  c/w home advair  Previous sputum cx achrombacter sensitive to ceftazidime    V/Q scan: abnormal quantitative lung perfusion scan.  Markedly reduced perfusion in the right and left upper lobes.    Barium swallow: Moderate gastroesophageal reflux. Otherwise normal esophagram.    pt s/p RHC, ECHO, sniff test; testing complete and pt ready for d/c w/ completion of Abx course at home

## 2019-04-04 NOTE — PROGRESS NOTE ADULT - ATTENDING COMMENTS
42 y/o F admitted for CF exacerbation. Improving on antibiotics. Undergoing workup for lung transplant.     - Complete course of abx  - Awaiting TTE w/bubble study and fluoro sniff test
42 y/o F admitted for CF exacerbation. Improving on antibiotics. Undergoing workup for lung transplant.     - Plan for discharge today  - Complete course of abx as outpatient  - Testing complete    I spent 25 minutes discharging this patient
Conrado Camara MD  Pager [phone]  After 5pm/weekends call [phone]
Conrado Camara MD  Pager [phone]  After 5pm/weekends call [phone]
Conrado Camara MD  Pager [phone]  After 5pm/weekends call [phone]
Jaquan Zendejas  Pager: [phone]. If no response or past 5 pm call [phone].    My colleague will cover starting 4/2
Seen and Examined.  Agree with plan as outlined by ID fellow above.    Pt with CF and Achromobacter in sputum in the setting of multiple drug allergies.  Admitted for monitoring while on IV antibiotics.  Continue ceftaz.  Seems to be tolerating.  Will d/w pulm regarding need for inhaled polymixin.  Pt does not appear to be receiving now.    Rest of work-up as per pulmonary.  Continue mucus clearing devices.    Please call ID with questions this weekend    Marcy Regalado MD  [phone] (pager)  [phone] (office)
CF infection, continue abx, plan for RHC today.  Plz arrange for TTE with buddle study and fluoroscopy sniff test.
CF disease. Continue with ceftazidine instead of avycaz for now.  Continue with work up for lung Tx.

## 2019-04-04 NOTE — PROGRESS NOTE ADULT - ASSESSMENT
The pt is 42 y/o F with a PMH of CF who presents for monitoring while receiving antibiotics for her CF exacerbation in the setting of multiple drug allergies and concern for cross-reactivity.   No fever or leukocytosis   Prior had MDR Achromobacter infection.   Allergies to multiple abx.   Tolerating ceftazidime so far.  Clinically improving  She is planning for lung transplant at [facility]    Plan:   c/w Ceftazidime 2 gm IV q8h  Monitor for rash or other reaction - so far tolerating ceftazidime  Anticipate a 2-3 week  course of abx.  Patient states she will followup with Dr. Hollis as outpatient.  Rest of the management per pulm.

## 2019-04-04 NOTE — PROGRESS NOTE ADULT - SUBJECTIVE AND OBJECTIVE BOX
CC: Patient is a 43y old  Female who presents with a chief complaint of CF exacerbation (04 Apr 2019 07:33)    ID following for CF exacerbation    Interval History/ROS: Patient feeling better. Has no new complaints. Denies fever, chills.    Rest of ROS negative.    Allergies  adhesives (Rash)  chlorhexidine containing compounds (Rash)  clindamycin (Hives)  fluoroquinolone antibiotics (Unknown)  levofloxacin (Hives)  macrolide antibiotics (Other)  minocycline (Rash)  moxifloxacin (Rash)  penicillin (Hives)  Zithromax Z-Ivan (Hives)    ANTIMICROBIALS:  cefTAZidime  IVPB    cefTAZidime  IVPB 2 every 8 hours    OTHER MEDS:  acetaminophen   Tablet .. 650 milliGRAM(s) Oral every 6 hours PRN  ALBUTerol    0.083% 2.5 milliGRAM(s) Nebulizer two times a day  ALBUTerol    90 MICROgram(s) HFA Inhaler 1 Puff(s) Inhalation four times a day PRN  cholecalciferol 4000 Unit(s) Oral daily  dornase diana Solution 2.5 milliGRAM(s) Inhalation two times a day  loratadine 10 milliGRAM(s) Oral daily  montelukast 10 milliGRAM(s) Oral daily  pantoprazole    Tablet 40 milliGRAM(s) Oral before breakfast  predniSONE   Tablet 20 milliGRAM(s) Oral daily  sodium chloride 7% Inhalation 4 milliLiter(s) Inhalation two times a day    PE:    Vital Signs Last 24 Hrs  T(C): 36.5 (04 Apr 2019 04:58), Max: 36.5 (03 Apr 2019 19:47)  T(F): 97.7 (04 Apr 2019 04:58), Max: 97.7 (03 Apr 2019 19:47)  HR: 84 (04 Apr 2019 10:29) (79 - 91)  BP: 111/67 (04 Apr 2019 04:58) (102/71 - 111/67)  BP(mean): --  RR: 17 (04 Apr 2019 04:58) (17 - 18)  SpO2: 97% (04 Apr 2019 04:58) (96% - 97%)    Gen: AOx3, NAD, non-toxic  CV: S1+S2 normal, no murmurs  Resp: Clear bilat, no resp distress  Abd: Soft, nontender, +BS  Ext: No LE edema, no wounds  : No Jimenez  IV/Skin: No thrombophlebitis, PICC line intact  Neuro: no focal deficits    LABS:                          10.6   10.21 )-----------( 353      ( 04 Apr 2019 06:20 )             34.1       04-04    141  |  102  |  16  ----------------------------<  86  4.1   |  24  |  0.54    Ca    8.9      04 Apr 2019 06:20  Phos  3.7     04-04  Mg     2.0     04-04    TPro  6.7  /  Alb  3.9  /  TBili  < 0.2<L>  /  DBili  x   /  AST  13  /  ALT  14  /  AlkPhos  51  04-04    MICROBIOLOGY:  v  SPUTUM  03-30-19 --  --  --      BLOOD  03-29-19 --  --  --    RADIOLOGY:    < from: Xray Esophagram (04.01.19 @ 10:20) >  IMPRESSION:     Moderate gastroesophageal reflux. Otherwise normal esophagram.    < end of copied text >

## 2019-04-04 NOTE — PROGRESS NOTE ADULT - PROBLEM SELECTOR PROBLEM 1
CF (Cystic Fibrosis)

## 2019-04-04 NOTE — DISCHARGE NOTE NURSING/CASE MANAGEMENT/SOCIAL WORK - NSDCDPATPORTLINK_GEN_ALL_CORE
You can access the [facility], offered by [facility], by registering with the following website: [email or web address]

## 2019-04-11 ENCOUNTER — MEDICATION RENEWAL (OUTPATIENT)
Age: 44
End: 2019-04-11

## 2019-04-15 ENCOUNTER — FORM ENCOUNTER (OUTPATIENT)
Age: 44
End: 2019-04-15

## 2019-04-15 ENCOUNTER — APPOINTMENT (OUTPATIENT)
Dept: PULMONOLOGY | Facility: CLINIC | Age: 44
End: 2019-04-15
Payer: COMMERCIAL

## 2019-04-15 VITALS
RESPIRATION RATE: 18 BRPM | WEIGHT: 191 LBS | HEIGHT: 63.5 IN | DIASTOLIC BLOOD PRESSURE: 80 MMHG | HEART RATE: 88 BPM | TEMPERATURE: 97.3 F | OXYGEN SATURATION: 90 % | BODY MASS INDEX: 33.42 KG/M2 | SYSTOLIC BLOOD PRESSURE: 142 MMHG

## 2019-04-15 VITALS
SYSTOLIC BLOOD PRESSURE: 151 MMHG | RESPIRATION RATE: 18 BRPM | OXYGEN SATURATION: 97 % | DIASTOLIC BLOOD PRESSURE: 81 MMHG | TEMPERATURE: 97.9 F | HEART RATE: 101 BPM | WEIGHT: 178 LBS | BODY MASS INDEX: 31.15 KG/M2 | HEIGHT: 63.5 IN

## 2019-04-15 DIAGNOSIS — Z88.9 ALLERGY STATUS TO UNSPECIFIED DRUGS, MEDICAMENTS AND BIOLOGICAL SUBSTANCES: ICD-10-CM

## 2019-04-15 PROCEDURE — 99215 OFFICE O/P EST HI 40 MIN: CPT | Mod: 25

## 2019-04-15 PROCEDURE — 99354: CPT

## 2019-04-15 NOTE — HISTORY OF PRESENT ILLNESS
[PICC] : a PICC line [3  -  Moderate] : 3, moderate [MSSA] : MSSA [Other: ___] : [unfilled] [___] : the last positive MSSA result was [unfilled] [Last AFB Date: ___] : the AFB was performed  [unfilled] [Daily] : daily cough reported [None] : ~He/She~ has no hemoptysis [FVC ___] : the FVC was [unfilled] liters [FEV1: ___] : the FEV1 was [unfilled] liters [] : izabella suárez [Good] : good [Sinus Pain] : sinus pain [Osteopenia] : osteopenia [Date: ___] : on [unfilled] [HgA1C Value: ___] : HgA1C value was [unfilled]  [Normal] : OGTT results were normal [Stable] : stable [Occasional] : occasional [Constipation] : constipation [Pancreatitis] : no pancreatitis [AFB] : the patient had a MAC negative AFB [Diarrhea] : no diarrhea [Steatorrhea] : no steatorrhea [CFRD] : no CFRD [de-identified] : This is a 44 y/o female CF pt who was dx'ed CF at 21 y/o, Pt's younger sister was dx'ed CF at birth. Pt was sweat tested at age 5 and family was told she has false positive sweat test then. Pt grew up having frequent pneumonia, episodes of hemoptysis and recurrent sinus infection. 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 Sweat test on 10/12/98 was 65.3. Pt never had stool Pancrease testing and she does not have s/s of PI. + chronic sinusitis and 2 sinus surgeries, last was 2012.\par Been on Kalydeco since 2014 and reports increased energy level, decreased cough, decreased exacerbation and frequency; prior to Kalydeco, was using IV antibiotics and having exacerbations every 2-3 months along with sinus infections.\par Post Kalydeco, she gets 1-2 exacerbations needed PO antibiotics. Recently increased frequency of exacerbations.\par On Symdeko 4/2018 tolerating w/o adverse effects\par \par Pt has an allergy to chlorahexadine during exacerbation in December, going forward can only use iodine for PICC dressing changes, and sorba view dressing\par \par Here today for f/u visit.  Currently being treated for exacerbation\par 2/9 started on IV Coli 150 mg and Bactrim\par 2/11 pt reported tingling in finger tips, Coli dose lowered to 100 mg\par 2/13 pt reported s/s of bactrim allergy.  Bactrim stopped and pt changed to Imipenem\par 2/14 pt admitted to [facility] for desensitization for bactrim\par did NOT receive IV antibiotics 2/14 to 2/20\par 2/20 pt desensitized to bactrim, tolerated doses well\par 2/22 pt d/c'ed home\par 2/23 pt restarted IV Colistin 100 m, and continued IV Bactrim \par 3/10- contacted on call c/o numbness tingling of hands and feet with combination of Colistin and Cipro. Instructed to hold doses.\par 3/11-restart Cipro and Bactrim only d/c Colistin\par 3/21- currently on 4 weeks of Bactrim, 14 days Cipro\par 3/22- contacted c/o SOB with bactrim infusion. D/c Bactrim. Continue Cipro IV for 21 days\par 3/26-Prednisone added to regimen 10mg daily and Cipro continued for 21 days\par 3/28-not back to baseline. Direct admit to administer Ceftaz IV as h/o multiple allergies. \par \par Present today for f/u on Day 14 of Ceftazidime. Enodorsing improvement in respiratory symptoms. Prednisone increased to 20mg daily during hospitalization. Remains on same dose. Endorsing sputum is clear/white and only productive with ACT or exercise.\par Is able to use stationary bike 4-5x/week 4-5miles, at low intensity. \par \par Still endorsing anterior RUQ lower sternum pressure 5/10. Denies sharp pain, or acute SOB/resp distress. Denies referred pain. Takes tylenol prn with improvement or heat application.  Last occurrence was 1x/last week. Denies provocation with exertion. No associated triggers. \par \par GI: has GERD on PPI. Endorses eats spicy foods frequently, and despite PPI daily still has intermittent GERD symptoms. Last occurrence yesterday with Mexican food. Denies h/o pancreatitis, gallstones, jaundice.  Denies acute/sudden pain. \par \par denies fever/wheezing/tightness/GI complaints, occasional constipation that resolves with prunes\par \par ACT: Al/HS/Pulm BID\par Using HS 7% and is tolerating well.  \par Ophir vest and using it 30 minutes BID\par Using Aerobika and ACT called RC-cornet multiple times a day\par Completed Alonso  OFF cylce  /OFF Colistin\par \par ENT: denies using sinus rinses\par \par Transplant: f/u with [facility] on 5/1\par  [de-identified] : Fasting 86 2hr 86 [FreeTextEntry1] : \par

## 2019-04-15 NOTE — REVIEW OF SYSTEMS
[Cough] : cough [SOB on Exertion] : shortness of breath during exertion [PND] : PND [Negative] : Endocrine [see HPI] : see HPI [Heartburn] : heartburn [Constipation] : constipation [Fever] : no fever [Chills] : no chills [Nasal Discharge] : no nasal discharge [Feeling Tired] : not feeling tired

## 2019-04-15 NOTE — PHYSICAL EXAM
[General Appearance - Well Developed] : well developed [Normal Appearance] : normal appearance [No Deformities] : no deformities [General Appearance - In No Acute Distress] : no acute distress [Normal Conjunctiva] : the conjunctiva exhibited no abnormalities [Eyelids - No Xanthelasma] : the eyelids demonstrated no xanthelasmas [Neck Appearance] : the appearance of the neck was normal [Jugular Venous Distention Increased] : there was no jugular-venous distention [Neck Cervical Mass (___cm)] : no neck mass was observed [Thyroid Diffuse Enlargement] : the thyroid was not enlarged [Thyroid Nodule] : there were no palpable thyroid nodules [Heart Sounds Gallop] : no gallops [Heart Rate And Rhythm] : heart rate was normal and rhythm regular [Heart Sounds] : normal S1 and S2 [Heart Sounds Pericardial Friction Rub] : no pericardial rub [Murmurs] : no murmurs [Respiration, Rhythm And Depth] : normal respiratory rhythm and effort [Exaggerated Use Of Accessory Muscles For Inspiration] : no accessory muscle use [Bowel Sounds] : normal bowel sounds [Abdomen Tenderness] : non-tender [Abdomen Soft] : soft [Abdomen Mass (___ Cm)] : no abdominal mass palpated [Abnormal Walk] : normal gait [Musculoskeletal - Swelling] : no joint swelling seen [Motor Tone] : muscle strength and tone were normal [PICC] : PICC [Right] : right [Arm] : arm [Single] : it is a single lumen catheter [Cyanosis, Localized] : no localized cyanosis [Petechial Hemorrhages (___cm)] : no petechial hemorrhages [Skin Turgor] : normal skin turgor [Skin Color & Pigmentation] : normal skin color and pigmentation [] : no rash [Sensation] : the sensory exam was normal to light touch and pinprick [Oriented To Time, Place, And Person] : oriented to person, place, and time [No Focal Deficits] : no focal deficits [Affect] : the affect was normal [Redness] : no redness [Swelling] : no swelling [Tenderness] : no tenderness [Discharge] : no discharge [FreeTextEntry1] : digital clubbing [Excoriation] : no excoriation of surrounding skin

## 2019-04-15 NOTE — ASSESSMENT
[FreeTextEntry1] : Patient is a 42 y/o female CF pt who was dx'ed CF at 23 y/o, Pt's younger sister was dx'ed CF at birth. Pt was sweat tested at age 5 and family was told she has false positive sweat test then. Pt grew up having frequent pneumonia, episodes of hemoptysis and recurrent sinus infection. 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 Sweat test on 10/12/98 was 65.3. \par \par Here today for f/u visit.  Day 14 of Ceftazidime for Achromobacter.\par \par 2/9 started on IV Coli 150 mg and Bactrim\par 2/11 pt reported tingling in finger tips, Coli dose lowered to 100 mg\par 2/13 pt reported s/s of bactrim allergy.  Bactrim stopped and pt changed to Imipenem\par 2/14 pt admitted to [facility] for desensitization for bactrim\par did NOT receive IV antibiotics 2/14 to 2/20\par 2/20 pt desensitized to bactrim, tolerated doses well\par 2/22 pt d/c'ed home\par 2/23 pt restarted IV Colistin 100 mg and continued IV Bactrim\par 3/10- contacted on call c/o numbness tingling of hands and feet with combination of Colistin and Cipro. Instructed to hold doses.\par 3/11-restart Cipro and Bactrim only d/c Colistin\par 3/21- currently on 4 weeks of Bactrim, 14 days Cipro\par 3/22-endorsing SOB with bactrim infusion. D/C Bactrim Continue Cipro for 21 days.\par 3/26-added Prednisone as still endorsing SOB\par 3/29-directly admitted for ID consult and Ceftazidime. Stopped Cipro. \par 4/4-discharged home on Ceftazidime \par \par \par FEV1  26% remains low last week 28% from (3/7/19) 27%----->FeV1 31% (8/2018), FEV1 29% 3/27/18, lower than baseline. FEV1 has been 32 - 33% in 2016; 39% in 2015. 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 coli. \par \par Sputum history: Achromobacter, MSSA\par - PICC C/D/I\par - Continue Ceftazidime for an additional week. Responding clinically to course. \par -Currently on Prednisone 20mg daily, taper by 2.5mg every week until off\par -approved for inhaled colistin and received shipment. Will start once IV complete. Achromobacter intermediate to Ceftazidime, will obtain inhaled to alternate with colistin\par -D/C inhaled Tobramycin as resistant. \par - CANNOT USE TIW AZITHROMYCIN AS ANTIINFLAMMATORY GIVEN ALL HER ALLERGY HX, WILL LIKELY NEED DAILY LOW DOSE PREDNISONE. OSTEOPENIA - START CALCIUM CITRATE. ON VITAMIN D. CONSIDER BISPHOSPHONATE.  Is going for BD tomorrow will f/u results.\par \par ALLERGY - pt had skin testing and is NOT allergic to CIPRO/LEVAQUIN, CLINDAMYCIN. \par PER Allergy:\par There is still a risk of delayed reactions with these antibiotics. If treatment with any of these antibiotics is indicated and no adequate alternatives are available, can start antibiotics, along with daily nonsedating oral antihistamines such as cetirizine and Montelukast. Continue cetirizine and Montelukast for 5 days after antibiotic is completed. \par \par Social: pt has not returned to work since last exacerbation.  Unsure if she will return due to disease progression/severity.  Discussing with  [place] option of disability and insurance coverage.  Referred to CF legal to help with this process.  Provided emotional support as she processes these changes.\par \par LUNG TRANSPLANT - next appt scheduled for 5/1\par \par ACT adherence is improved, using [place] VEST BID and Aerobika multiple times per day, as well as exercise 4-5x/week\par Now using HS 7% and tolerating well, continue with Alb/HS/Pulmozyme BID.\par Continue Advair.\par \par MODULATOR - Tolerating symdeko, started in 4/2018. F/U LFTS\par \par ENT - chronic sinusitis - had followup with ENT. Sinuses clear, no need for surgery.\par PND - Continue Dymista, likes more than flonase\par \par Endocrine - OGTT - normal, this year repeat annually.\par \par Heme - anemia. Hbg 8.9 from 11 IN 2016 last year. Started Niferix, repeat blood work improved hgb WNL\par \par GI - Still needs colonoscopy. Continue pantoprazole for GERD. DC pepcid. \par \par Advanced care/GOC discussion - ONGOING WITH USE OF DECISION AID BEING PILOTED IN OUR CENTER. d/w pt regarding risks and benefits of lung transplant, and ventilator.  participated as well. \par transplant work up after IV course:\par \par complete PFT scheduled\par ABG complete\par Janes V/Q complete\par ECG complete\par Echo with bubble study completed\par RHC completed\par bone densiity schedule 4/16/19\par barium esophogram completed\par sniff test, fluorscopy completed\par overnight oximetry-completed\par \par f/u in 1 month

## 2019-04-15 NOTE — END OF VISIT
[>50% of Time Spent on Counseling and Coordination of Care for  ___] : Greater than 50% of the encounter time was spent on counseling and coordination of care for [unfilled] [Time Spent: ___ minutes] : I have spent [unfilled] minutes of face to face time with the patient [FreeTextEntry3] : I agree with the nurse practitioners history, physical examination and plan of care. I personally elicited a history and examined the patient\par time spent 1:15-2:20 pm\par Overall pt feeling better, exercising on bike 30 minutes without O2 desaturations.\par extend IV ceftazidime for 3rd week, then observe off IV while maintaining PICC. start inhaled colistin after completing 3 weeks of IV ceftazidime. Taper prednisone by 2.5 mg per week, f/u in OV while on 10 mg daily. would like to taper prednisone as tolerated. Was on 20 mg for 2 weeks since DC from hospital with IV ceftazidime. \par Order inh cefta to alternate with inh colistin.\par

## 2019-04-16 ENCOUNTER — APPOINTMENT (OUTPATIENT)
Dept: MAMMOGRAPHY | Facility: IMAGING CENTER | Age: 44
End: 2019-04-16
Payer: COMMERCIAL

## 2019-04-16 ENCOUNTER — APPOINTMENT (OUTPATIENT)
Dept: RADIOLOGY | Facility: IMAGING CENTER | Age: 44
End: 2019-04-16
Payer: COMMERCIAL

## 2019-04-16 ENCOUNTER — APPOINTMENT (OUTPATIENT)
Dept: PULMONOLOGY | Facility: CLINIC | Age: 44
End: 2019-04-16

## 2019-04-16 ENCOUNTER — OUTPATIENT (OUTPATIENT)
Dept: OUTPATIENT SERVICES | Facility: HOSPITAL | Age: 44
LOS: 1 days | End: 2019-04-16
Payer: COMMERCIAL

## 2019-04-16 DIAGNOSIS — Z00.8 ENCOUNTER FOR OTHER GENERAL EXAMINATION: ICD-10-CM

## 2019-04-16 PROCEDURE — 77080 DXA BONE DENSITY AXIAL: CPT | Mod: 26

## 2019-04-16 PROCEDURE — 77080 DXA BONE DENSITY AXIAL: CPT

## 2019-04-17 ENCOUNTER — MEDICATION RENEWAL (OUTPATIENT)
Age: 44
End: 2019-04-17

## 2019-04-25 ENCOUNTER — MEDICATION RENEWAL (OUTPATIENT)
Age: 44
End: 2019-04-25

## 2019-04-26 ENCOUNTER — APPOINTMENT (OUTPATIENT)
Dept: PULMONOLOGY | Facility: CLINIC | Age: 44
End: 2019-04-26
Payer: COMMERCIAL

## 2019-04-26 PROCEDURE — 94729 DIFFUSING CAPACITY: CPT

## 2019-04-26 PROCEDURE — 94726 PLETHYSMOGRAPHY LUNG VOLUMES: CPT

## 2019-04-26 PROCEDURE — 94060 EVALUATION OF WHEEZING: CPT

## 2019-05-02 ENCOUNTER — MEDICATION RENEWAL (OUTPATIENT)
Age: 44
End: 2019-05-02

## 2019-05-20 LAB — RHODAMINE-AURAMINE STN SPEC: NORMAL

## 2019-05-22 ENCOUNTER — FORM ENCOUNTER (OUTPATIENT)
Age: 44
End: 2019-05-22

## 2019-05-23 ENCOUNTER — APPOINTMENT (OUTPATIENT)
Dept: RADIOLOGY | Facility: IMAGING CENTER | Age: 44
End: 2019-05-23
Payer: COMMERCIAL

## 2019-05-23 ENCOUNTER — APPOINTMENT (OUTPATIENT)
Dept: PULMONOLOGY | Facility: CLINIC | Age: 44
End: 2019-05-23
Payer: COMMERCIAL

## 2019-05-23 ENCOUNTER — OUTPATIENT (OUTPATIENT)
Dept: OUTPATIENT SERVICES | Facility: HOSPITAL | Age: 44
LOS: 1 days | End: 2019-05-23
Payer: COMMERCIAL

## 2019-05-23 ENCOUNTER — LABORATORY RESULT (OUTPATIENT)
Age: 44
End: 2019-05-23

## 2019-05-23 VITALS
SYSTOLIC BLOOD PRESSURE: 127 MMHG | TEMPERATURE: 97.5 F | HEIGHT: 63.5 IN | BODY MASS INDEX: 31.15 KG/M2 | OXYGEN SATURATION: 96 % | HEART RATE: 70 BPM | WEIGHT: 178 LBS | DIASTOLIC BLOOD PRESSURE: 81 MMHG | RESPIRATION RATE: 18 BRPM

## 2019-05-23 DIAGNOSIS — E84.9 CYSTIC FIBROSIS, UNSPECIFIED: ICD-10-CM

## 2019-05-23 PROCEDURE — 71046 X-RAY EXAM CHEST 2 VIEWS: CPT

## 2019-05-23 PROCEDURE — 99215 OFFICE O/P EST HI 40 MIN: CPT

## 2019-05-23 PROCEDURE — 71046 X-RAY EXAM CHEST 2 VIEWS: CPT | Mod: 26

## 2019-05-24 LAB
25(OH)D3 SERPL-MCNC: 33.6 NG/ML
ACID FAST STN SPT: NORMAL
ALBUMIN SERPL ELPH-MCNC: 5 G/DL
ALP BLD-CCNC: 45 U/L
ALT SERPL-CCNC: 14 U/L
ANION GAP SERPL CALC-SCNC: 14 MMOL/L
APPEARANCE: CLEAR
AST SERPL-CCNC: 14 U/L
BASOPHILS # BLD AUTO: 0.05 K/UL
BASOPHILS NFR BLD AUTO: 0.6 %
BILIRUB SERPL-MCNC: 0.3 MG/DL
BILIRUBIN URINE: NEGATIVE
BLOOD URINE: NEGATIVE
BUN SERPL-MCNC: 14 MG/DL
CALCIUM SERPL-MCNC: 9.9 MG/DL
CHLORIDE SERPL-SCNC: 99 MMOL/L
CO2 SERPL-SCNC: 26 MMOL/L
COLOR: NORMAL
CREAT SERPL-MCNC: 0.68 MG/DL
EOSINOPHIL # BLD AUTO: 0.02 K/UL
EOSINOPHIL NFR BLD AUTO: 0.2 %
GLUCOSE QUALITATIVE U: NEGATIVE
GLUCOSE SERPL-MCNC: 93 MG/DL
HCT VFR BLD CALC: 39.9 %
HGB BLD-MCNC: 12.7 G/DL
IMM GRANULOCYTES NFR BLD AUTO: 0.2 %
KETONES URINE: NEGATIVE
LEUKOCYTE ESTERASE URINE: NEGATIVE
LYMPHOCYTES # BLD AUTO: 0.98 K/UL
LYMPHOCYTES NFR BLD AUTO: 11.3 %
MAN DIFF?: NORMAL
MCHC RBC-ENTMCNC: 30.5 PG
MCHC RBC-ENTMCNC: 31.8 GM/DL
MCV RBC AUTO: 95.9 FL
MONOCYTES # BLD AUTO: 0.5 K/UL
MONOCYTES NFR BLD AUTO: 5.7 %
NEUTROPHILS # BLD AUTO: 7.14 K/UL
NEUTROPHILS NFR BLD AUTO: 82 %
NITRITE URINE: NEGATIVE
PH URINE: 6.5
PLATELET # BLD AUTO: 365 K/UL
POTASSIUM SERPL-SCNC: 4.4 MMOL/L
PROT SERPL-MCNC: 7.6 G/DL
PROTEIN URINE: NEGATIVE
RBC # BLD: 4.16 M/UL
RBC # FLD: 13.4 %
SODIUM SERPL-SCNC: 139 MMOL/L
SPECIFIC GRAVITY URINE: 1.02
UROBILINOGEN URINE: NORMAL
WBC # FLD AUTO: 8.71 K/UL

## 2019-05-25 NOTE — REVIEW OF SYSTEMS
[Wheezing] : wheezing [Cough] : cough [Negative] : Heme/Lymph [Chills] : no chills [Fever] : no fever [Chest Pain] : no chest pain [SOB on Exertion] : no shortness of breath during exertion [Abdominal Pain] : no abdominal pain [Shortness Of Breath] : no shortness of breath [Vomiting] : no vomiting [Diarrhea] : no diarrhea

## 2019-05-25 NOTE — END OF VISIT
[] : Fellow [FreeTextEntry3] : tolerating inhaled colistin for one month, now just started inh ceftazidime, tolerating. Well visit. Had c/o right sided pain - MSK in nature, continue prn tylenol, NSAIDS which help. CXR was without new changes, reviewed with pt. no leukocytosis. advised  to deliver manual chest pt, avoid Vest use in that area for a few days to heal.

## 2019-05-25 NOTE — ASSESSMENT
[FreeTextEntry1] : Patient is a 42 y/o female CF pt who was dx'ed CF at 21 y/o, Pt's younger sister was dx'ed CF at birth. Pt was sweat tested at age 5 and family was told she has false positive sweat test then. Pt grew up having frequent pneumonia, episodes of hemoptysis and recurrent sinus infection. 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 Sweat test on 10/12/98 was 65.3. \par \par Here today for f/u visit. s/p recent treated for exacerbation. doing well. on inhaled colistin (off this month) and inhaled ceftazidime (on this month).\par \par Recent history:\par 2/9 started on IV Coli 150 mg and Bactrim\par 2/11 pt reported tingling in finger tips, Coli dose lowered to 100 mg\par 2/13 pt reported s/s of bactrim allergy. Bactrim stopped and pt changed to Imipenem\par 2/14 pt admitted to [facility] for desensitization for bactrim\par did NOT receive IV antibiotics 2/14 to 2/20\par 2/20 pt desensitized to bactrim, tolerated doses well\par 2/22 pt d/c'ed home\par 2/23 pt restarted IV Colistin 100 mg and continued IV Bactrim\par 3/10- contacted on call c/o numbness tingling of hands and feet with combination of Colistin and Cipro. Instructed to hold doses.\par 3/11-restart Cipro and Bactrim only d/c Colistin\par 3/21- currently on 4 weeks of Bactrim, 14 days Cipro\par 3/22-endorsing SOB with bactrim infusion. D/C Bactrim Continue Cipro for 21 days.\par 4/22 - completed 21 day course of IV ceftazidime\par \par FEV1 Not improved 26% remains low compared to prior PFT 28% from (3/7/19) 27%----->FeV1 31% (8/2018), FEV1 29% 3/27/18, lower than baseline. FEV1 has been 32 - 33% in 2016; 39% in 2015. 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 coli. \par \par Sputum history: Achromobacter, MSSA\par Inhaled COLISTIN now alternating with inhaled ceftazidime.\par \par ACT adherence is improved, using [place] VEST BID and Aerobika multiple times per day\par using HS 7% and tolerating well, continue with Alb/HS/Pulmozyme BID.\par Continue Advair.\par - 3/22 - PT complained of SOB with infusion of IV bactrim, no signs of allergic reaction - STOPPED BACTRIM - s/p 4 weeks total..\par -Increased Prednisone to 20mg daily now down to 10 mg daily. plan to decrease by 2.5 mg / week\par \par - CANNOT USE TIW AZITHROMYCIN AS ANTIINFLAMMATORY GIVEN ALL HER ALLERGY HX, Continue PREDNISONE  taper. OSTEOPENIA - CALCIUM CITRATE. ON VITAMIN D. CONSIDER BISPHOSPHONATE. \par \par ALLERGY - pt had skin testing and is NOT allergic to CIPRO/LEVAQUIN, CLINDAMYCIN. \par PER Allergy:\par There is still a risk of delayed reactions with these antibiotics. If treatment with any of these antibiotics is indicated and no adequate alternatives are available, can start antibiotics, along with daily nonsedating oral antihistamines such as cetirizine and Montelukast. Continue cetirizine and Montelukast for 5 days after antibiotic is completed. \par \par Social: pt has not returned to work since last exacerbation. Unsure if she will return due to disease progression/severity. Discussing with  [place] option of disability and insurance coverage. Referred to CF legal to help with this process. Provided emotional support as she processes these changes.\par \par CFTR MODULATOR - Tolerating symdeko, started in 4/2018. 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 [place]. \par \par ENT - chronic sinusitis - had followup with ENT. Sinuses clear, no need for surgery.\par PND - Continue Dymista, likes more than flonase\par \par Endocrine - OGTT - normal, this year repeat annually.\par Bone density demonstrated osteopenia, Vitamin D deficiency - patient on vitamin D 4000 IU. Not on Calcium recommended to take OTC MVI\par \par Heme - anemia. Hbg 8.9 from 11 IN 2016 last year. Started Niferix, repeat blood work improved hgb WNL\par \par GI - Still needs colonoscopy. Continue pantoprazole for GERD. DC pepcid. \par \par PT - using bike, O2 sat is 93-95% on bike at [place]. \par Advanced care/GOC discussion - ONGOING WITH USE OF DECISION AID BEING PILOTED IN OUR CENTER. d/w pt regarding risks and benefits of lung transplant, and ventilator.  participated as well. \par transplant work up after IV course:\par \par complete PFT\par ABG\par Janes V/Q\par ECG\par Echo with bubble study\par RHC\par bone densiity\par barium esophogram\par sniff test, fluorscopy\par \par Order MMR vaccine.\par WILL CHECK ANNUAL LABS THIS VISIT - CBC, CMP,  VITAMIN ADEK LEVELS, ASPERGILLUS ABS, UA, and CXR

## 2019-05-25 NOTE — HISTORY OF PRESENT ILLNESS
[de-identified] : Overall the patient states she has been stable. This is a 42 y/o female CF pt who was dx'ed CF at 21 y/o, Pt's younger sister was dx'ed CF at birth. Pt was sweat tested at age 5 and family was told she has false positive sweat test then. Pt grew up having frequent pneumonia, episodes of hemoptysis and recurrent sinus infection. 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 Sweat test on 10/12/98 was 65.3. Pt never had stool Pancrease testing and she does not have s/s of PI. + chronic sinusitis and 2 sinus surgeries, last was 2012.\par Been on Kalydeco since 2014 and reports increased energy level, decreased cough, decreased exacerbation and frequency; prior to Kalydeco, was using IV antibiotics and having exacerbations every 2-3 months along with sinus infections.\par Post Kalydeco, she gets 1-2 exacerbations needed PO antibiotics. Recently increased frequency of exacerbations.\par On Symdeko 4/2018 tolerating w/o adverse effects\par \par Pt has an allergy to chlorahexadine during exacerbation in December, going forward can only use iodine for PICC dressing changes, and sorba view dressing\par \par Here today for f/u visit. Recently treated with 21-day course of ceftaz for exacerbation.\par 2/9 started on IV Coli 150 mg and Bactrim\par 2/11 pt reported tingling in finger tips, Coli dose lowered to 100 mg\par 2/13 pt reported s/s of bactrim allergy. Bactrim stopped and pt changed to Imipenem\par 2/14 pt admitted to [facility] for desensitization for bactrim\par did NOT receive IV antibiotics 2/14 to 2/20\par 2/20 pt desensitized to bactrim, tolerated doses well\par 2/22 pt d/c'ed home\par 2/23 pt restarted IV Colistin 100 m, and continued IV Bactrim \par 3/10- contacted on call c/o numbness tingling of hands and feet with combination of Colistin and Cipro. Instructed to hold doses.\par 3/11-restart Cipro and Bactrim only d/c Colistin\par 3/21- currently on 4 weeks of Bactrim, 14 days Cipro\par 3/22- contacted c/o SOB with bactrim infusion. D/c Bactrim. Continue Cipro IV for 21 days\par 3/26-Prednisone added to regimen 10mg daily and Cipro continued for 21 days\par 3/28-not back to baseline. Direct admit to administer Ceftaz IV as h/o multiple allergies. \par 4/22 - completed 21 day course of Ceftazidime for Achromobacter.\par \par Endorsing improvement in respiratory symptoms. Remains on 10 mg of prednisone. Minimal sputum or coughing except with HypserSal. Is able to use stationary bike 4-5x/week 4-5miles, at low intensity. Completed month of inhaled colistin. Now on day 2 of inhaled ceftaz.\par \par Still endorsing anterior RUQ lower sternum pressure 5/10. Denies sharp pain, or acute SOB/resp distress. Denies referred pain. Takes tylenol prn with improvement or heat application. Last occurrence was 1x/last week. Denies provocation with exertion. No associated triggers. \par \par GI: has GERD on PPI. Endorses eats spicy foods frequently, and despite PPI daily still has intermittent GERD symptoms. Last occurrence yesterday with Mexican food. Denies h/o pancreatitis, gallstones, jaundice. Denies acute/sudden pain. \par \par denies fever/wheezing/tightness/GI complaints, occasional constipation that resolves with prunes\par \par ACT: Al/HS/Pulm BID\par Using HS 7% and is tolerating well. \par [place] vest and using it 30 minutes BID\par Using Aerobika and [facility]\par Completed Alonso OFF cylce /OFF Colistin\par \par ENT: denies using sinus rinses\par \par Transplant: f/u with [place] on 5/1. Met wit [facility] team. Plans to start home pulmonary rehab program. needs to be updated on immunization per [facility].\par \par \par Pulmonary HPI: Patient's perceived exertion as measured by the Leanne Dyspnea Scale is 3, moderate. Her infection history includes MSSA and Achromobacter 3/7/19 and E. Coli 1/5/17. The last positive MSSA result was 1/5/17. The patient had a MAC negative AFB. The AFB was performed 3/7/19. daily cough reported. Her cough is productive of occasional sputum. She has no hemoptysis. Her last chest xray was was performed 2019. A PFT was performed 3/21/19, the FVC was 2.13/58% liters and the FEV1 was 0.82/28% liters. A PFT was performed 3/7/19, the FVC was 1.93/53 liters and the FEV1 was 0.80/27% liters. Airway clearance therapy orders include acapella b.i.d. and vest b.i.d. The patient's compliance with ACT is good. \par Sinus HPI: sinus pain. \par Gastrointestinal History: constipation, but no pancreatitis, no diarrhea and no steatorrhea. \par Endocrine History: osteopenia, but no CFRD . The last HgA1C was performed on 7/16/18. HgA1C value was 5.3 . A 2 hour OGTT was performed on 7/31/18 . OGTT results were normal. Fasting 86 2hr 86 \par \par

## 2019-05-25 NOTE — PHYSICAL EXAM
[General Appearance - Well Developed] : well developed [General Appearance - In No Acute Distress] : no acute distress [Normal Conjunctiva] : the conjunctiva exhibited no abnormalities [Normal Oral Mucosa] : normal oral mucosa [Neck Appearance] : the appearance of the neck was normal [Neck Cervical Mass (___cm)] : no neck mass was observed [Apical Impulse] : the apical impulse was normal [Heart Rate And Rhythm] : heart rate was normal and rhythm regular [Heart Sounds] : normal S1 and S2 [Murmurs] : no murmurs [] : no respiratory distress [Respiration, Rhythm And Depth] : normal respiratory rhythm and effort [Exaggerated Use Of Accessory Muscles For Inspiration] : no accessory muscle use [Bowel Sounds] : normal bowel sounds [Abdomen Soft] : soft [Abnormal Walk] : normal gait [Skin Color & Pigmentation] : normal skin color and pigmentation [No Focal Deficits] : no focal deficits [Affect] : the affect was normal [Mood] : the mood was normal [FreeTextEntry1] : Expiratory wheezing bilaterally

## 2019-05-31 ENCOUNTER — MOBILE ON CALL (OUTPATIENT)
Age: 44
End: 2019-05-31

## 2019-06-10 ENCOUNTER — MEDICATION RENEWAL (OUTPATIENT)
Age: 44
End: 2019-06-10

## 2019-06-17 ENCOUNTER — APPOINTMENT (OUTPATIENT)
Dept: PULMONOLOGY | Facility: CLINIC | Age: 44
End: 2019-06-17
Payer: COMMERCIAL

## 2019-06-17 PROCEDURE — 90471 IMMUNIZATION ADMIN: CPT

## 2019-06-17 PROCEDURE — 90707 MMR VACCINE SC: CPT

## 2019-06-19 LAB
A FUMIGATUS IGE QN: 3.13 KUA/L
A-TOCOPHEROL VIT E SERPL-MCNC: 10 MG/L
ASPERGILLUS FLAVUS PRECIPITINS: NEGATIVE
ASPERGILLUS FUMIGATES PRECIPTINS: NEGATIVE
ASPERGILLUS NIGER PRECIPITINS: NEGATIVE
BETA+GAMMA TOCOPHEROL SERPL-MCNC: 1.4 MG/L
DEPRECATED A FUMIGATUS IGE RAST QL: 2
MENADIONE SERPL-MCNC: 0.66 NG/ML
TOTAL IGE SMQN RAST: 184 KU/L
VIT A SERPL-MCNC: 47.9 UG/DL

## 2019-06-25 ENCOUNTER — APPOINTMENT (OUTPATIENT)
Dept: PULMONOLOGY | Facility: CLINIC | Age: 44
End: 2019-06-25
Payer: COMMERCIAL

## 2019-06-25 ENCOUNTER — MED ADMIN CHARGE (OUTPATIENT)
Age: 44
End: 2019-06-25

## 2019-06-25 VITALS
BODY MASS INDEX: 30.56 KG/M2 | WEIGHT: 179 LBS | OXYGEN SATURATION: 97 % | HEART RATE: 68 BPM | RESPIRATION RATE: 17 BRPM | DIASTOLIC BLOOD PRESSURE: 81 MMHG | HEIGHT: 64 IN | SYSTOLIC BLOOD PRESSURE: 137 MMHG | TEMPERATURE: 97.7 F

## 2019-06-25 PROCEDURE — 94010 BREATHING CAPACITY TEST: CPT

## 2019-06-25 PROCEDURE — ZZZZZ: CPT

## 2019-06-25 PROCEDURE — 90471 IMMUNIZATION ADMIN: CPT

## 2019-06-25 PROCEDURE — 99215 OFFICE O/P EST HI 40 MIN: CPT | Mod: 25

## 2019-06-25 PROCEDURE — 90746 HEPB VACCINE 3 DOSE ADULT IM: CPT

## 2019-06-25 RX ORDER — FLUCONAZOLE 150 MG/1
150 TABLET ORAL
Qty: 1 | Refills: 0 | Status: DISCONTINUED | COMMUNITY
Start: 2018-12-13 | End: 2019-06-25

## 2019-06-25 RX ORDER — FLUCONAZOLE 150 MG/1
150 TABLET ORAL
Qty: 3 | Refills: 0 | Status: DISCONTINUED | COMMUNITY
Start: 2019-04-11 | End: 2019-06-25

## 2019-06-25 RX ORDER — PREDNISONE 2.5 MG/1
2.5 TABLET ORAL
Qty: 60 | Refills: 0 | Status: DISCONTINUED | COMMUNITY
Start: 2019-04-15 | End: 2019-06-25

## 2019-06-25 RX ORDER — PREDNISONE 5 MG/1
5 TABLET ORAL
Qty: 30 | Refills: 1 | Status: DISCONTINUED | COMMUNITY
Start: 2019-03-21 | End: 2019-06-25

## 2019-06-25 RX ORDER — TOBRAMYCIN 300 MG/5ML
300 SOLUTION RESPIRATORY (INHALATION) TWICE DAILY
Qty: 1 | Refills: 3 | Status: DISCONTINUED | COMMUNITY
Start: 2017-11-13 | End: 2019-06-25

## 2019-06-25 RX ORDER — FEXOFENADINE HYDROCHLORIDE 180 MG/1
180 TABLET ORAL DAILY
Qty: 1 | Refills: 2 | Status: DISCONTINUED | COMMUNITY
Start: 2018-08-29 | End: 2019-06-25

## 2019-06-26 NOTE — ASSESSMENT
[FreeTextEntry1] : Patient is a 42 y/o female CF pt who was dx'ed CF at 23 y/o, Pt's younger sister was dx'ed CF at birth. Pt was sweat tested at age 5 and family was told she has false positive sweat test then. Pt grew up having frequent pneumonia, episodes of hemoptysis and recurrent sinus infection. 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 Sweat test on 10/12/98 was 65.3. \par \par Here today for f/u well visit. S/p 1 month of inhaled Ceftazidime alternating with Colistin (ON now). Endorsing baseline symptoms. Completed Prednisone taper 2 days ago. \par unilateral wheezing/rhonchi on right side, probably mucus - advised that she can perform a mid-day ACT - alb/hs/vest. \par \par Recent history:\par 2/9 started on IV Coli 150 mg and Bactrim\par 2/11 pt reported tingling in finger tips, Coli dose lowered to 100 mg\par 2/13 pt reported s/s of bactrim allergy. Bactrim stopped and pt changed to Imipenem\par 2/14 pt admitted to [facility] for desensitization for bactrim\par did NOT receive IV antibiotics 2/14 to 2/20\par 2/20 pt desensitized to bactrim, tolerated doses well\par 2/22 pt d/c'ed home\par 2/23 pt restarted IV Colistin 100 mg and continued IV Bactrim\par 3/10- contacted on call c/o numbness tingling of hands and feet with combination of Colistin and Cipro. Instructed to hold doses.\par 3/11-restart Cipro and Bactrim only d/c Colistin\par 3/21- currently on 4 weeks of Bactrim, 14 days Cipro\par 3/22-endorsing SOB with bactrim infusion. D/C Bactrim Continue Cipro for 21 days.\par 4/22 - completed 21 day course of IV ceftazidime\par \par FEV1 (3/7/19) 27%----->FeV1 31% (8/2018), FEV1 29% 3/27/18; 27% today (6/25/19).\par FEV1 has been 32 - 33% in 2016; 39% in 2015. 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 coli. \par \par Sputum history: Achromobacter, MSSA\par Inhaled COLISTIN now alternating with inhaled ceftazidime.\par \par ACT adherence is improved, using [place] VEST BID and Aerobika multiple times per day\par using HS 7% and tolerating well, continue with Alb/HS/Pulmozyme BID.\par Continue Advair.\par - 3/22 - PT complained of SOB with infusion of IV bactrim, no signs of allergic reaction - STOPPED BACTRIM - s/p 4 weeks total..\par \par - CANNOT USE TIW AZITHROMYCIN AS ANTIINFLAMMATORY GIVEN ALL HER ALLERGY HX, Continue PREDNISONE  taper. OSTEOPENIA - CALCIUM CITRATE. ON VITAMIN D. CONSIDER BISPHOSPHONATE. \par \par ALLERGY - pt had skin testing and is NOT allergic to CIPRO/LEVAQUIN, CLINDAMYCIN. \par PER Allergy:\par There is still a risk of delayed reactions with these antibiotics. If treatment with any of these antibiotics is indicated and no adequate alternatives are available, can start antibiotics, along with daily nonsedating oral antihistamines such as cetirizine and Montelukast. Continue cetirizine and Montelukast for 5 days after antibiotic is completed. \par \par Social: pt has not returned to work since last exacerbation. Unsure if she will return due to disease progression/severity. Discussing with  [place] option of disability and insurance coverage. Referred to CF legal to help with this process. Provided emotional support as she processes these changes.\par \par CFTR MODULATOR - Tolerating symdeko, started in 4/2018. 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 \par \par ENT - chronic sinusitis - had followup with ENT. Sinuses clear, no need for surgery.\par PND - Continue Dymista, likes more than flonase\par \par Endocrine - OGTT - normal, this year repeat annually.\par Bone density demonstrated osteopenia, Vitamin D deficiency - patient on vitamin D 4000 IU. Not on Calcium recommended to take OTC MVI\par \par Heme - anemia. Hbg 8.9 from 11 IN 2016 last year. Started Niferix, repeat blood work improved hgb WNL\par \par GI - Still needs colonoscopy. Continue pantoprazole for GERD. DC pepcid. \par \par PT - using bike, O2 sat is 93-95% on bike at [facility]. \par Advanced care/GOC discussion - ONGOING WITH USE OF DECISION AID BEING PILOTED IN OUR CENTER. d/w pt regarding risks and benefits of lung transplant, and ventilator.  participated as well. \par transplant work up after IV course:\par \par complete PFT done\par ABG done\par Janes V/Q done\par ECG done\par Echo with bubble study done\par RHC done\par bone densiity done\par barium esophogram done\par sniff test, fluorscopy done\par \par Recieved MMR booster vaccine\par Started Hepatitis B vaccine series today,\par Will administer Prevnar 23 in 1 year\par Still needs Tetnus, and shingles vaccine. Is going to see if [facility] [place] has Shingrex vaccine.\par \par f/u in 1 month for Hep B vaccine 2nd injection with OV exam.

## 2019-06-26 NOTE — PHYSICAL EXAM
[General Appearance - Well Developed] : well developed [General Appearance - In No Acute Distress] : no acute distress [Normal Conjunctiva] : the conjunctiva exhibited no abnormalities [Normal Oral Mucosa] : normal oral mucosa [Neck Appearance] : the appearance of the neck was normal [Apical Impulse] : the apical impulse was normal [Neck Cervical Mass (___cm)] : no neck mass was observed [Heart Rate And Rhythm] : heart rate was normal and rhythm regular [Heart Sounds] : normal S1 and S2 [Murmurs] : no murmurs [Respiration, Rhythm And Depth] : normal respiratory rhythm and effort [] : no respiratory distress [Exaggerated Use Of Accessory Muscles For Inspiration] : no accessory muscle use [Abdomen Soft] : soft [Bowel Sounds] : normal bowel sounds [Abnormal Walk] : normal gait [No Focal Deficits] : no focal deficits [Skin Color & Pigmentation] : normal skin color and pigmentation [Affect] : the affect was normal [Mood] : the mood was normal [Oriented To Time, Place, And Person] : oriented to person, place, and time [FreeTextEntry1] : Expiratory wheezing/rhonchi on right side only

## 2019-06-26 NOTE — REVIEW OF SYSTEMS
[Cough] : cough [Nasal Discharge] : nasal discharge [SOB on Exertion] : shortness of breath during exertion [see HPI] : see HPI [Heartburn] : heartburn [Negative] : Genitourinary [Fever] : no fever [Chills] : no chills [Chest Pain] : no chest pain [Shortness Of Breath] : no shortness of breath [Abdominal Pain] : no abdominal pain [Vomiting] : no vomiting [Diarrhea] : no diarrhea

## 2019-06-26 NOTE — HISTORY OF PRESENT ILLNESS
[Age at Diagnosis: ___] : the patient is a ~age~  ~male/female~ whose age at diagnosis was [unfilled] [Sweat Test] : Sweat Test [Genetic Testing] : Genetic Testing [None] : no venous access device [Siblings with CF] : ~He/She~ has sibling(s) with CF [Good] : good [Date: ___] : The last HgA1C was performed on [unfilled] [de-identified] : followed by Transplant at [place] [facility] [de-identified] : Overall the patient states she has been stable. This is a 42 y/o female CF pt who was dx'ed CF at 21 y/o, Pt's younger sister was dx'ed CF at birth. Pt was sweat tested at age 5 and family was told she has false positive sweat test then. Pt grew up having frequent pneumonia, episodes of hemoptysis and recurrent sinus infection. 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 Sweat test on 10/12/98 was 65.3. Pt never had stool Pancrease testing and she does not have s/s of PI. + chronic sinusitis and 2 sinus surgeries, last was 2012.\par Been on Kalydeco since 2014 and reports increased energy level, decreased cough, decreased exacerbation and frequency; prior to Kalydeco, was using IV antibiotics and having exacerbations every 2-3 months along with sinus infections.\par Post Kalydeco, she gets 1-2 exacerbations needed PO antibiotics. Recently increased frequency of exacerbations.\par On Symdeko 4/2018 tolerating w/o adverse effects\par \par Pt has an allergy to chlorahexadine during exacerbation in December, going forward can only use iodine for PICC dressing changes, and sorba view dressing\par \par Here today for f/u s/p 1month of inhaled Ceftazadine. Recently tapered off Prednisone last dose 2 days prior. Endorsing baseline respiratory symptoms. \par Minimal sputum or coughing except with HyperSal. Is able to use stationary bike 4-5x/week 4-5miles, at low intensity. 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 \par OFF Ceftaz ON Colistin\par \par \par GI: has GERD on PPI. Endorses eats spicy foods frequently, and despite PPI daily still has intermittent GERD symptoms. Last occurrence yesterday with Mexican food. Denies h/o pancreatitis, gallstones, jaundice. Denies acute/sudden pain. Improved on BID dosing.\par \par denies fever/wheezing/tightness/GI complaints, occasional constipation that resolves with prunes\par \par ACT: Al/HS/Pulm BID\par Using HS 7% and is tolerating well. \par [place] vest and using it 30 minutes BID\par Using Aerobika [facility]\par Previously on Alonso now cycling Ceftazidine with colistin.\par \par ENT: denies using sinus rinses\par \par Transplant: f/u with [place] on 5/1. Met wit [place] team. Plans to start home pulmonary rehab program. needs to be updated on immunization per [place].\par \par \par Pulmonary HPI: Patient's perceived exertion as measured by the Leanne Dyspnea Scale is 3, moderate. Her infection history includes MSSA and Achromobacter 3/7/19 and E. Coli 1/5/17. The last positive MSSA result was 1/5/17. The patient had a MAC negative AFB. The AFB was performed 3/7/19. daily cough reported. Her cough is productive of occasional sputum. She has no hemoptysis. Her last chest xray was was performed 2019. A PFT was performed 3/21/19, the FVC was 2.13/58% liters and the FEV1 was 0.82/28% liters. A PFT was performed 3/7/19, the FVC was 1.93/53 liters and the FEV1 was 0.80/27% liters. Airway clearance therapy orders include acapella b.i.d. and vest b.i.d. The patient's compliance with ACT is good. \par Sinus HPI: sinus pain. \par Gastrointestinal History: constipation, but no pancreatitis, no diarrhea and no steatorrhea. \par Endocrine History: osteopenia, but no CFRD . The last HgA1C was performed on 7/16/18. HgA1C value was 5.3 . A 2 hour OGTT was performed on 7/31/18 . OGTT results were normal. Fasting 86 2hr 86 \par \par  [de-identified] : PND

## 2019-07-03 ENCOUNTER — LABORATORY RESULT (OUTPATIENT)
Age: 44
End: 2019-07-03

## 2019-07-04 ENCOUNTER — FORM ENCOUNTER (OUTPATIENT)
Age: 44
End: 2019-07-04

## 2019-07-05 ENCOUNTER — APPOINTMENT (OUTPATIENT)
Dept: CT IMAGING | Facility: IMAGING CENTER | Age: 44
End: 2019-07-05
Payer: COMMERCIAL

## 2019-07-05 ENCOUNTER — OUTPATIENT (OUTPATIENT)
Dept: OUTPATIENT SERVICES | Facility: HOSPITAL | Age: 44
LOS: 1 days | End: 2019-07-05

## 2019-07-05 ENCOUNTER — OUTPATIENT (OUTPATIENT)
Dept: OUTPATIENT SERVICES | Facility: HOSPITAL | Age: 44
LOS: 1 days | End: 2019-07-05
Payer: COMMERCIAL

## 2019-07-05 DIAGNOSIS — E84.9 CYSTIC FIBROSIS, UNSPECIFIED: ICD-10-CM

## 2019-07-05 PROCEDURE — 71250 CT THORAX DX C-: CPT

## 2019-07-05 PROCEDURE — 71250 CT THORAX DX C-: CPT | Mod: 26

## 2019-07-08 ENCOUNTER — MEDICATION RENEWAL (OUTPATIENT)
Age: 44
End: 2019-07-08

## 2019-07-08 LAB
A FUMIGATUS IGE QN: 2.93 KUA/L
BASOPHILS # BLD AUTO: 0.07 K/UL
BASOPHILS NFR BLD AUTO: 1.2 %
DEPRECATED A FUMIGATUS IGE RAST QL: 2
EOSINOPHIL # BLD AUTO: 0.47 K/UL
EOSINOPHIL NFR BLD AUTO: 7.9 %
HCT VFR BLD CALC: 42.8 %
HGB BLD-MCNC: 13.1 G/DL
IMM GRANULOCYTES NFR BLD AUTO: 0.3 %
LYMPHOCYTES # BLD AUTO: 1.51 K/UL
LYMPHOCYTES NFR BLD AUTO: 25.4 %
MAN DIFF?: NORMAL
MCHC RBC-ENTMCNC: 30.2 PG
MCHC RBC-ENTMCNC: 30.6 GM/DL
MCV RBC AUTO: 98.6 FL
MONOCYTES # BLD AUTO: 0.64 K/UL
MONOCYTES NFR BLD AUTO: 10.8 %
NEUTROPHILS # BLD AUTO: 3.24 K/UL
NEUTROPHILS NFR BLD AUTO: 54.4 %
PLATELET # BLD AUTO: 322 K/UL
RBC # BLD: 4.34 M/UL
RBC # FLD: 13.5 %
TOTAL IGE SMQN RAST: 146 KU/L
WBC # FLD AUTO: 5.95 K/UL

## 2019-07-09 LAB
ASPERGILLUS FLAVUS PRECIPITINS: NEGATIVE
ASPERGILLUS FUMIGATES PRECIPTINS: NEGATIVE
ASPERGILLUS NIGER PRECIPITINS: NEGATIVE

## 2019-07-10 ENCOUNTER — APPOINTMENT (OUTPATIENT)
Dept: PULMONOLOGY | Facility: CLINIC | Age: 44
End: 2019-07-10
Payer: COMMERCIAL

## 2019-07-10 VITALS
SYSTOLIC BLOOD PRESSURE: 137 MMHG | RESPIRATION RATE: 17 BRPM | OXYGEN SATURATION: 98 % | HEIGHT: 64 IN | DIASTOLIC BLOOD PRESSURE: 86 MMHG | WEIGHT: 179 LBS | HEART RATE: 69 BPM | BODY MASS INDEX: 30.56 KG/M2

## 2019-07-10 PROCEDURE — 99215 OFFICE O/P EST HI 40 MIN: CPT

## 2019-07-11 LAB
A FLAVUS AB FLD QL: NEGATIVE
A FUMIGATUS AB FLD QL: NEGATIVE
A NIGER AB FLD QL: NEGATIVE

## 2019-07-11 NOTE — PHYSICAL EXAM
[General Appearance - Well Developed] : well developed [General Appearance - In No Acute Distress] : no acute distress [Normal Conjunctiva] : the conjunctiva exhibited no abnormalities [Normal Oral Mucosa] : normal oral mucosa [Neck Appearance] : the appearance of the neck was normal [Neck Cervical Mass (___cm)] : no neck mass was observed [Apical Impulse] : the apical impulse was normal [Heart Rate And Rhythm] : heart rate was normal and rhythm regular [Heart Sounds] : normal S1 and S2 [Murmurs] : no murmurs [] : no respiratory distress [Respiration, Rhythm And Depth] : normal respiratory rhythm and effort [Exaggerated Use Of Accessory Muscles For Inspiration] : no accessory muscle use [Bowel Sounds] : normal bowel sounds [Abdomen Soft] : soft [Abnormal Walk] : normal gait [Skin Color & Pigmentation] : normal skin color and pigmentation [No Focal Deficits] : no focal deficits [Oriented To Time, Place, And Person] : oriented to person, place, and time [Affect] : the affect was normal [Mood] : the mood was normal [FreeTextEntry1] : rhochi bilaterally predominatly lower lobes

## 2019-07-11 NOTE — REVIEW OF SYSTEMS
[Nasal Discharge] : nasal discharge [Cough] : cough [SOB on Exertion] : shortness of breath during exertion [see HPI] : see HPI [Heartburn] : heartburn [Negative] : Heme/Lymph [Fever] : no fever [Chills] : no chills [Shortness Of Breath] : no shortness of breath [Chest Pain] : no chest pain [Abdominal Pain] : no abdominal pain [Diarrhea] : no diarrhea [Vomiting] : no vomiting

## 2019-07-11 NOTE — ASSESSMENT
[FreeTextEntry1] : Patient is a 42 y/o female CF pt who was dx'ed CF at 21 y/o, Pt's younger sister was dx'ed CF at birth. Pt was sweat tested at age 5 and family was told she has false positive sweat test then. Pt grew up having frequent pneumonia, episodes of hemoptysis and recurrent sinus infection. 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 Sweat test on 10/12/98 was 65.3. \par \par Here today for f/u visit.\par Endorsing improvement in pulmonary symptoms since increased ACT TID from BID. \par Continue Prednsione 2.5mg daily \par CT Chest completed and unchanged from previous No evidence of new infiltrate or Pneumonia\par Labs performed for ABPA w/u negative. \par \par Recent history:\par 2/9 started on IV Coli 150 mg and Bactrim\par 2/11 pt reported tingling in finger tips, Coli dose lowered to 100 mg\par 2/13 pt reported s/s of bactrim allergy. Bactrim stopped and pt changed to Imipenem\par 2/14 pt admitted to [facility] for desensitization for bactrim\par did NOT receive IV antibiotics 2/14 to 2/20\par 2/20 pt desensitized to bactrim, tolerated doses well\par 2/22 pt d/c'ed home\par 2/23 pt restarted IV Colistin 100 mg and continued IV Bactrim\par 3/10- contacted on call c/o numbness tingling of hands and feet with combination of Colistin and Cipro. Instructed to hold doses.\par 3/11-restart Cipro and Bactrim only d/c Colistin\par 3/21- currently on 4 weeks of Bactrim, 14 days Cipro\par 3/22-endorsing SOB with bactrim infusion. D/C Bactrim Continue Cipro for 21 days.\par 4/22 - completed 21 day course of IV ceftazidime\par \par FEV1 (3/7/19) 27%----->FeV1 31% (8/2018), FEV1 29% 3/27/18; 27% today (6/25/19).\par FEV1 has been 32 - 33% in 2016; 39% in 2015. 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 coli. \par \par Sputum history: Achromobacter, MSSA\par Inhaled COLISTIN now alternating with inhaled ceftazidime.\par \par ACT adherence is improved, using [place] VEST BID and Aerobika multiple times per day\par using HS 7% and tolerating well, continue with Alb/HS/Pulmozyme BID.\par Continue Advair.\par - 3/22 - PT complained of SOB with infusion of IV bactrim, no signs of allergic reaction - STOPPED BACTRIM - s/p 4 weeks total..\par \par - CANNOT USE TIW AZITHROMYCIN AS ANTIINFLAMMATORY GIVEN ALL HER ALLERGY HX, Continue PREDNISONE  5mg daily. OSTEOPENIA - CALCIUM CITRATE. ON VITAMIN D. CONSIDER BISPHOSPHONATE. \par \par ALLERGY - pt had skin testing and is NOT allergic to CIPRO/LEVAQUIN, CLINDAMYCIN. \par PER Allergy:\par There is still a risk of delayed reactions with these antibiotics. If treatment with any of these antibiotics is indicated and no adequate alternatives are available, can start antibiotics, along with daily nonsedating oral antihistamines such as cetirizine and Montelukast. Continue cetirizine and Montelukast for 5 days after antibiotic is completed. .\par \par CFTR MODULATOR - Tolerating symdeko, started in 4/2018. 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. \par \par ENT - chronic sinusitis - had followup with ENT. Sinuses clear, no need for surgery.\par PND - Start Afrin for no more than 3 days, and then resume Dymista.  \par \par Endocrine - OGTT - normal, this year repeat annually.\par Bone density demonstrated osteopenia, Vitamin D deficiency - patient on vitamin D 4000 IU. Not on Calcium recommended to take OTC MVI\par \par Heme - anemia. Hbg 8.9 from 11 IN 2016 last year. Started Niferix, repeat blood work improved hgb WNL\par \par GI - Still needs colonoscopy. Continue pantoprazole for GERD. DC pepcid. \par \par PT - using bike, O2 sat is 93-95% on bike at [facility]. \par Advanced care/GOC discussion - ONGOING WITH USE OF DECISION [facility] BEING PILOTED IN OUR CENTER. d/w pt regarding risks and benefits of lung transplant, and ventilator.  participated as well. \par transplant work up after IV course:\par \par \par Recieved MMR booster vaccine\par Started Hepatitis B vaccine series\par Will administer Prevnar 23 in 1 year\par Still needs Tetnus, and shingles vaccine. Is going to see if [facility] [place] has Shingrex [facility].\par \par f/u in 2 months with spirometry

## 2019-07-11 NOTE — HISTORY OF PRESENT ILLNESS
[Age at Diagnosis: ___] : the patient is a ~age~  ~male/female~ whose age at diagnosis was [unfilled] [Sweat Test] : Sweat Test [Genetic Testing] : Genetic Testing [Siblings with CF] : ~He/She~ has sibling(s) with CF [None] : no venous access device [Good] : good [Date: ___] : The last HgA1C was performed on [unfilled] [de-identified] : PND [de-identified] : This is a 42 y/o female CF pt who was dx'ed CF at 21 y/o, Pt's younger sister was dx'ed CF at birth. Pt was sweat tested at age 5 and family was told she has false positive sweat test then. Pt grew up having frequent pneumonia, episodes of hemoptysis and recurrent sinus infection. 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 Sweat test on 10/12/98 was 65.3. Pt never had stool Pancrease testing and she does not have s/s of PI. + chronic sinusitis and 2 sinus surgeries, last was 2012.\par Been on Kalydeco since 2014 and reports increased energy level, decreased cough, decreased exacerbation and frequency; prior to Kalydeco, was using IV antibiotics and having exacerbations every 2-3 months along with sinus infections.\par Post Kalydeco, she gets 1-2 exacerbations needed PO antibiotics. Recently increased frequency of exacerbations.\par On Symdeko 4/2018 tolerating w/o adverse effects\par \par Pt has an allergy to chlorahexadine during exacerbation in December, going forward can only use iodine for PICC dressing changes, and sorba view dressing\par \par Here today for f/u.\par Was started on Prednisone 5mg daily (day 7) for wheezing, increased cough and sputum production during LOV.\par Endoring symptom improvement with increased ACT TID and Prednisone. Intermittently wheezing at night when lies down due to PND and cough. Uses Dymista nasal spray daily for >5 months now. Stating Flonase ineffective.\par \par Minimal sputum or coughing except with HyperSal. Is able to use stationary bike 4-5x/week 4-5miles, at low intensity. Stating sputum is white to light yellow currently. 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 \par OFF Ceftaz ON Colistin\par \par GI: has GERD on PPI. Endorses eats spicy foods frequently, and despite PPI daily still has intermittent GERD symptoms. . Denies h/o pancreatitis, gallstones, jaundice. Denies acute/sudden pain. Improved on BID dosing.\par \par denies fever/hemoptysis/GI complaints, occasional constipation that resolves with prunes\par \par ACT: Al/HS TID/Pulm BID\par Using HS 7% and is tolerating well. \par [place] vest and using it 30 minutes TID\par Using Aerobika and ACT called RC-cornet multiple times a day\par Previously on Alonso now cycling Ceftazidine with colistin.\par \par ENT: denies using sinus rinses. +PND using Dymista daily. \par \par Transplant: f/u with [place] on 5/1. Met with [facility] team. Still pending Colonoscopy to be schedule at [facility], dental exam, dermatology skin cancer screening and immunizations. \par \par Pulmonary HPI: Patient's perceived exertion as measured by the Leanne Dyspnea Scale is 3, moderate. Her infection history includes MSSA and Achromobacter 3/7/19 and E. Coli 1/5/17. The last positive MSSA result was 1/5/17. The patient had a MAC negative AFB. The AFB was performed 3/7/19. daily cough reported. Her cough is productive of occasional sputum. She has no hemoptysis. Her last chest xray was was performed 2019. A PFT was performed 3/21/19, the FVC was 2.13/58% liters and the FEV1 was 0.82/28% liters. A PFT was performed 3/7/19, the FVC was 1.93/53 liters and the FEV1 was 0.80/27% liters. Airway clearance therapy orders include acapella b.i.d. and vest b.i.d. The patient's compliance with ACT is good. \par Sinus HPI: sinus pain. \par Gastrointestinal History: constipation, but no pancreatitis, no diarrhea and no steatorrhea. \par Endocrine History: osteopenia, but no CFRD . The last HgA1C was performed on 7/16/18. HgA1C value was 5.3 . A 2 hour OGTT was performed on 7/31/18 . OGTT results were normal. Fasting 86 2hr 86 \par \par  [de-identified] : followed by Transplant at [place] Dr. Cunningham

## 2019-07-25 ENCOUNTER — MEDICATION RENEWAL (OUTPATIENT)
Age: 44
End: 2019-07-25

## 2019-07-26 ENCOUNTER — APPOINTMENT (OUTPATIENT)
Dept: PULMONOLOGY | Facility: CLINIC | Age: 44
End: 2019-07-26
Payer: COMMERCIAL

## 2019-07-29 ENCOUNTER — APPOINTMENT (OUTPATIENT)
Dept: PULMONOLOGY | Facility: CLINIC | Age: 44
End: 2019-07-29
Payer: COMMERCIAL

## 2019-07-29 VITALS
WEIGHT: 180 LBS | DIASTOLIC BLOOD PRESSURE: 88 MMHG | RESPIRATION RATE: 15 BRPM | HEIGHT: 64 IN | TEMPERATURE: 98 F | HEART RATE: 74 BPM | SYSTOLIC BLOOD PRESSURE: 144 MMHG | BODY MASS INDEX: 30.73 KG/M2 | OXYGEN SATURATION: 94 %

## 2019-07-29 PROCEDURE — 90746 HEPB VACCINE 3 DOSE ADULT IM: CPT

## 2019-07-29 PROCEDURE — G0010: CPT

## 2019-08-08 ENCOUNTER — MEDICATION RENEWAL (OUTPATIENT)
Age: 44
End: 2019-08-08

## 2019-09-03 ENCOUNTER — MEDICATION RENEWAL (OUTPATIENT)
Age: 44
End: 2019-09-03

## 2019-09-10 ENCOUNTER — APPOINTMENT (OUTPATIENT)
Dept: PULMONOLOGY | Facility: CLINIC | Age: 44
End: 2019-09-10
Payer: COMMERCIAL

## 2019-09-10 VITALS
HEIGHT: 64 IN | OXYGEN SATURATION: 97 % | SYSTOLIC BLOOD PRESSURE: 125 MMHG | WEIGHT: 185 LBS | TEMPERATURE: 97.7 F | BODY MASS INDEX: 31.58 KG/M2 | RESPIRATION RATE: 18 BRPM | HEART RATE: 66 BPM | DIASTOLIC BLOOD PRESSURE: 81 MMHG

## 2019-09-10 PROCEDURE — 99215 OFFICE O/P EST HI 40 MIN: CPT

## 2019-09-11 NOTE — END OF VISIT
[FreeTextEntry3] : I agree with the PA's history, physical examination and plan of care. I personally elicited a history and examined the patient\par \par

## 2019-09-11 NOTE — HISTORY OF PRESENT ILLNESS
[Age at Diagnosis: ___] : the patient is a ~age~  ~male/female~ whose age at diagnosis was [unfilled] [Sweat Test] : Sweat Test [Genetic Testing] : Genetic Testing [None] : no venous access device [Siblings with CF] : ~He/She~ has sibling(s) with CF [Good] : good [Date: ___] : The last HgA1C was performed on [unfilled] [___ Month(s) Ago] : [unfilled] month(s) ago [de-identified] : followed by Transplant at [place] Dr. Cunningham [FreeTextEntry9] : not feeling well [de-identified] : This is a 44 y/o female CF pt who was dx'ed CF at 21 y/o, Pt's younger sister was dx'ed CF at birth. Pt was sweat tested at age 5 and family was told she has false positive sweat test then. Pt grew up having frequent pneumonia, episodes of hemoptysis and recurrent sinus infection. 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 Sweat test on 10/12/98 was 65.3. Pt never had stool Pancrease testing and she does not have s/s of PI. + chronic sinusitis and 2 sinus surgeries, last was 2012.\par Been on Kalydeco since 2014 and reports increased energy level, decreased cough, decreased exacerbation and frequency; prior to Kalydeco, was using IV antibiotics and having exacerbations every 2-3 months along with sinus infections.\par Post Kalydeco, she gets 1-2 exacerbations needed PO antibiotics. 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 OFF Ceftaz ON Colistin\par \par Minimal sputum or coughing except with HyperSal. \par Exercises able to use stationary bike 4-5x/week 4-5miles, at low intensity.\par \par GI: has GERD on PPI. Endorses eats spicy foods frequently, and despite PPI daily still has intermittent GERD symptoms. . Denies h/o pancreatitis, gallstones, jaundice. Denies acute/sudden pain. Improved on BID dosing.\par \par OV 9/10/19 Pt is here today for sick visit. reports increased sputum production yellow and white in color, increase frequency of cough, sob and wheezing, more fatigued. States sxs started about 1 week ago when she noticed her sputum went from white to light yellow. Denies fevers, chills, n/v, known sick contacts, hemoptysis. Here with  who is visually upset do to pts sxs and not feeling well\par \par ACT: Al/HS BID/Pulm BID\par Using HS 7% and is tolerating well. \par [place] vest and using it 30 minutes BID\par Using Aerobika and ACT called RC-samuelt multiple times a day\par Previously on Alonso now cycling OFF Ceftazidine with ON colistin. feels like Colistin does not help as much\par \par ENT: denies using sinus rinses. +PND using Dymista daily and Afirin 3days a week. \par \par Transplant: f/u with [place] on 5/1 next apt Dr Cunningham 9/25 and 9/27. Pt is having ablation 9/27 for dxd WPW seen on EKG, had dental exam, pending dermatology skin cancer screening\par \par Pulmonary HPI: Patient's perceived exertion as measured by the Leanne Dyspnea Scale is 3, moderate. Her infection history includes MSSA and Achromobacter 3/7/19 and E. Coli 1/5/17. The last positive MSSA result was 1/5/17. The patient had a MAC negative AFB. The AFB was performed 3/7/19. daily cough reported. Her cough is productive of occasional sputum. She has no hemoptysis. Her last chest xray was was performed 2019. A PFT was performed 3/21/19, the FVC was 2.13/58% liters and the FEV1 was 0.82/28% liters. A PFT was performed 3/7/19, the FVC was 1.93/53 liters and the FEV1 was 0.80/27% liters. Airway clearance therapy orders include acapella b.i.d. and vest b.i.d. The patient's compliance with ACT is good. \par Sinus HPI: sinus pain. \par Gastrointestinal History: constipation, but no pancreatitis, no diarrhea and no steatorrhea. Recent Colonscopy 9/5 reports wnl and repeat in 5 yrs \par Endocrine History: osteopenia, but no CFRD . The last HgA1C was performed on 7/16/18. HgA1C value was 5.3 . A 2 hour OGTT was performed on 7/31/18 . OGTT results were normal. Fasting 86 2hr 86 \par \par  [de-identified] : PND

## 2019-09-11 NOTE — ASSESSMENT
[FreeTextEntry1] : Patient is a 44 y/o female CF pt who was dx'ed CF at 23 y/o, Pt's younger sister was dx'ed CF at birth. Pt was sweat tested at age 5 and family was told she has false positive sweat test then. Pt grew up having frequent pneumonia, episodes of hemoptysis and recurrent sinus infection. 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 Sweat test on 10/12/98 was 65.3. \par \par Here today for sick visit. \par \par Recent history:\par 2/9 started on IV Coli 150 mg and Bactrim\par 2/11 pt reported tingling in finger tips, Coli dose lowered to 100 mg\par 2/13 pt reported s/s of bactrim allergy. Bactrim stopped and pt changed to Imipenem\par 2/14 pt admitted to [facility] for desensitization for bactrim\par did NOT receive IV antibiotics 2/14 to 2/20\par 2/20 pt desensitized to bactrim, tolerated doses well\par 2/22 pt d/c'ed home\par 2/23 pt restarted IV Colistin 100 mg and continued IV Bactrim\par 3/10- contacted on call c/o numbness tingling of hands and feet with combination of Colistin and Cipro. Instructed to hold doses.\par 3/11-restart Cipro and Bactrim only d/c Colistin\par 3/21- currently on 4 weeks of Bactrim, 14 days Cipro\par 3/22-endorsing SOB with bactrim infusion. D/C Bactrim Continue Cipro for 21 days.\par 4/22 - completed 21 day course of IV ceftazidime\par \par FEV1 (3/7/19) 27%----->FeV1 31% (8/2018), FEV1 29% 3/27/18; 27% (6/25/19).\par FEV1 has been 32 - 33% in 2016; 39% in 2015. 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 coli. \par \par Sputum history: Achromobacter, MSSA\par Inhaled COLISTIN now alternating with inhaled ceftazidime.\par \par ACT  using [place] VEST BID and Aerobika multiple times per day\par using HS 7% and tolerating well, continue with Alb/HS/Pulmozyme BID.\par Continue Advair.\par - 3/22 - PT complained of SOB with infusion of IV bactrim, no signs of allergic reaction - STOPPED BACTRIM - s/p 4 weeks total..\par \par - CANNOT USE TIW AZITHROMYCIN AS ANTIINFLAMMATORY GIVEN ALL HER ALLERGY HX, Continue PREDNISONE 5mg daily. OSTEOPENIA - CALCIUM CITRATE. ON VITAMIN D. CONSIDER BISPHOSPHONATE. \par \par ALLERGY - pt had skin testing and is NOT allergic to CIPRO/LEVAQUIN, CLINDAMYCIN. \par \par 9/10/19 Pt is here today for sick visit. reports increased sputum production yellow and white in color, increase frequency of cough, sob and wheezing, more fatigued. States sxs started about 1 week ago when she noticed her sputum went from white to light yellow. Denies fevers, chills, n/v, known sick contacts, hemoptysis\par \par CFTR MODULATOR - Tolerating symdeko, started in 4/2018. 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 Sinuses clear, no need for surgery.\par PND - Start Afrin for no more than 3 days, and then resume Dymista.  \par \par Endocrine - OGTT - normal, this year repeat annually.\par Bone density demonstrated osteopenia, Vitamin D deficiency - patient on vitamin D 4000 IU. Not on Calcium recommended to take OTC MVI\par \par Heme - anemia. Hbg 8.9 from 11 IN 2016 last year. Started Niferix, repeat blood work improved hgb WNL\par \par GI - Had Colonscopy wnl results reported. Continue pantoprazole for GERD.\par \par PT - using bike, O2 sat is 93-95% on bike at [facility]. \par \par Recieved MMR booster vaccine\par Started Hepatitis B vaccine series (due for shot #3 6 months from Booster #1) \par Will administer Prevnar 23 in 1 year\par Still needs Tetanus, and shingles vaccine. On waiting list for shingrix \par \par CF/exacerbation \par - Rx Cefdinir 300mg PO BID \par - Sputum CS \par - f.u culture results to assess if need for IV antibx or alt PO  \par - c/w Prednisone 5mg QD if not improving or worsens will consider short stress dose \par - refilled advair and fluconazole (if yeast infection develops)\par - f/u with results and schedule apt (due for SCARLET at next visit if well) \par \par In holding pattern pending sputum culture; to have ablation of WPW  in 2-3 weeks

## 2019-09-11 NOTE — PHYSICAL EXAM
[General Appearance - Well Developed] : well developed [General Appearance - In No Acute Distress] : no acute distress [Normal Conjunctiva] : the conjunctiva exhibited no abnormalities [Normal Oral Mucosa] : normal oral mucosa [Neck Appearance] : the appearance of the neck was normal [Neck Cervical Mass (___cm)] : no neck mass was observed [Heart Rate And Rhythm] : heart rate was normal and rhythm regular [Apical Impulse] : the apical impulse was normal [Murmurs] : no murmurs [Heart Sounds] : normal S1 and S2 [] : no respiratory distress [Respiration, Rhythm And Depth] : normal respiratory rhythm and effort [Exaggerated Use Of Accessory Muscles For Inspiration] : no accessory muscle use [Abdomen Soft] : soft [Skin Color & Pigmentation] : normal skin color and pigmentation [Abnormal Walk] : normal gait [No Focal Deficits] : no focal deficits [Oriented To Time, Place, And Person] : oriented to person, place, and time [Affect] : the affect was normal [Mood] : the mood was normal [Normal Oropharynx] : normal oropharynx [FreeTextEntry1] : rhochi bilaterally predominatly lower lobes, BL wheezes, diffuse crackles R> L

## 2019-09-11 NOTE — REVIEW OF SYSTEMS
[Nasal Discharge] : nasal discharge [Cough] : cough [SOB on Exertion] : shortness of breath during exertion [see HPI] : see HPI [Heartburn] : heartburn [Negative] : Heme/Lymph [Fever] : no fever [Chills] : no chills [Chest Pain] : no chest pain [Shortness Of Breath] : no shortness of breath [Abdominal Pain] : no abdominal pain [Vomiting] : no vomiting [Diarrhea] : no diarrhea

## 2019-09-12 ENCOUNTER — RX RENEWAL (OUTPATIENT)
Age: 44
End: 2019-09-12

## 2019-09-16 ENCOUNTER — FORM ENCOUNTER (OUTPATIENT)
Age: 44
End: 2019-09-16

## 2019-09-17 ENCOUNTER — OUTPATIENT (OUTPATIENT)
Dept: OUTPATIENT SERVICES | Facility: HOSPITAL | Age: 44
LOS: 1 days | End: 2019-09-17
Payer: COMMERCIAL

## 2019-09-17 DIAGNOSIS — E84.9 CYSTIC FIBROSIS, UNSPECIFIED: ICD-10-CM

## 2019-09-17 LAB — BACTERIA SPT CF RESP CULT: ABNORMAL

## 2019-09-17 PROCEDURE — 36573 INSJ PICC RS&I 5 YR+: CPT

## 2019-09-20 DIAGNOSIS — E84.9 CYSTIC FIBROSIS, UNSPECIFIED: ICD-10-CM

## 2019-09-20 DIAGNOSIS — Z45.2 ENCOUNTER FOR ADJUSTMENT AND MANAGEMENT OF VASCULAR ACCESS DEVICE: ICD-10-CM

## 2019-10-03 ENCOUNTER — MEDICATION RENEWAL (OUTPATIENT)
Age: 44
End: 2019-10-03

## 2019-10-07 ENCOUNTER — RX CHANGE (OUTPATIENT)
Age: 44
End: 2019-10-07

## 2019-10-07 ENCOUNTER — RX RENEWAL (OUTPATIENT)
Age: 44
End: 2019-10-07

## 2019-10-10 ENCOUNTER — MEDICATION RENEWAL (OUTPATIENT)
Age: 44
End: 2019-10-10

## 2019-10-24 ENCOUNTER — MED ADMIN CHARGE (OUTPATIENT)
Age: 44
End: 2019-10-24

## 2019-10-24 ENCOUNTER — APPOINTMENT (OUTPATIENT)
Dept: PULMONOLOGY | Facility: CLINIC | Age: 44
End: 2019-10-24
Payer: COMMERCIAL

## 2019-10-24 VITALS
WEIGHT: 181 LBS | TEMPERATURE: 97.9 F | RESPIRATION RATE: 15 BRPM | BODY MASS INDEX: 30.9 KG/M2 | SYSTOLIC BLOOD PRESSURE: 137 MMHG | DIASTOLIC BLOOD PRESSURE: 84 MMHG | HEART RATE: 77 BPM | OXYGEN SATURATION: 96 % | HEIGHT: 64 IN

## 2019-10-24 DIAGNOSIS — Z79.2 LONG TERM (CURRENT) USE OF ANTIBIOTICS: ICD-10-CM

## 2019-10-24 PROCEDURE — 99215 OFFICE O/P EST HI 40 MIN: CPT | Mod: 25

## 2019-10-24 PROCEDURE — 90686 IIV4 VACC NO PRSV 0.5 ML IM: CPT

## 2019-10-24 PROCEDURE — G0008: CPT

## 2019-10-24 RX ORDER — CEFDINIR 300 MG/1
300 CAPSULE ORAL
Qty: 20 | Refills: 0 | Status: DISCONTINUED | COMMUNITY
End: 2019-10-24

## 2019-10-24 RX ORDER — FLUCONAZOLE 150 MG/1
150 TABLET ORAL
Qty: 1 | Refills: 2 | Status: DISCONTINUED | COMMUNITY
Start: 2019-08-08 | End: 2019-10-24

## 2019-10-25 NOTE — ASSESSMENT
[FreeTextEntry1] : ATTENDING ATTESTATION\par Patient is a 44 y/o female CF pt who was dx'ed CF at 21 y/o, Pt's younger sister was dx'ed CF at birth. Pt was sweat tested at age 5 and family was told she has false positive sweat test then. Pt grew up having frequent pneumonia, episodes of hemoptysis and recurrent sinus infection. 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 Sweat test on 10/12/98 was 65.3. \par \par Here today for f/u. Completed 21 day course of Ceftaz and Colistin for CF exacerbation. Today is WELL VISIT. Eligbile for Trikafta - discussed SEs, given advanced CF, will start with 1 tablet AM and 1 pm for one week, nebulizing albuterol prn if chest tightness or increased cough; followed by full dose by week 2. LFT normal at baseline 10/7/19 - repeat Q 3 month x 1 year.\par \par Recent history:\par 2/9 started on IV Coli 150 mg and Bactrim\par 2/11 pt reported tingling in finger tips, Coli dose lowered to 100 mg\par 2/13 pt reported s/s of bactrim allergy. Bactrim stopped and pt changed to Imipenem\par 2/14 pt admitted to [facility] for desensitization for bactrim\par did NOT receive IV antibiotics 2/14 to 2/20\par 2/20 pt desensitized to bactrim, tolerated doses well\par 2/22 pt d/c'ed home\par 2/23 pt restarted IV Colistin 100 mg and continued IV Bactrim\par 3/10- contacted on call c/o numbness tingling of hands and feet with combination of Colistin and Cipro. Instructed to hold doses.\par 3/11-restart Cipro and Bactrim only d/c Colistin\par 3/21- currently on 4 weeks of Bactrim, 14 days Cipro\par 3/22-endorsing SOB with bactrim infusion. D/C Bactrim Continue Cipro for 21 days.\par 4/22 - completed 21 day course of IV ceftazidime and colistin\par \par FEV1 (6/25/19) 27%,  (3/7/19) 27%----->FeV1 31% (8/2018), FEV1 29% 3/27/18; 27% (6/25/19).\par FEV1 has been 32 - 33% in 2016; 39% in 2015. 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 coli. \par \par Sputum history: Achromobacter, MSSA\par Inhaled COLISTIN OFF alternating with inhaled ceftazidime ON.\par \par ACT  using [place] VEST BID and Aerobika multiple times per day\par using HS 7% and tolerating well, continue with Alb/HS/Pulmozyme BID.\par Continue Advair.\par - 3/22 - PT complained of SOB with infusion of IV bactrim, no signs of allergic reaction - STOPPED BACTRIM - s/p 4 weeks total..\par \par - CANNOT USE TIW AZITHROMYCIN AS ANTIINFLAMMATORY GIVEN ALL HER ALLERGY HX, Attempt to decrease Prednisone 5mg from daily to QOD. OSTEOPENIA - CALCIUM CITRATE. ON VITAMIN D. CONSIDER BISPHOSPHONATE. \par \par ALLERGY - pt had skin testing and is NOT allergic to CIPRO/LEVAQUIN, CLINDAMYCIN. \par \par CFTR MODULATOR - Tolerating symdeko, started in 4/2018. 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 not currently recommended per pt\par ENT - chronic sinusitis - had followup with ENT. Sinuses clear, no need for surgery.\par PND - Continue Dymista.  \par \par Endocrine - OGTT - normal, this year repeat annually.\par Bone density demonstrated osteopenia, Vitamin D deficiency - patient on vitamin D 4000 IU. On Calcium and MVI\par \par Heme - anemia. Hbg 8.9 from 11 IN 2016 last year. Started Niferix, repeat blood work improved hgb WNL\par \par GI - Had Colonoscopy wnl results reported. Continue pantoprazole for GERD.\par overweight due to chronic prednisone use. \par PT - using bike, O2 sat is 93-95% on bike at [facility]. \par \par Recieved MMR booster vaccine\par Started Hepatitis B vaccine series (due for shot #3 December 6 months from Booster #1) \par Will administer Prevnar 23 in 1 year\par Still needs Tetanus, and shingles vaccine. On waiting list for shingrix \par Flu shot administered today\par \par f/u in December

## 2019-10-25 NOTE — HISTORY OF PRESENT ILLNESS
[___ Month(s) Ago] : [unfilled] month(s) ago [Age at Diagnosis: ___] : the patient is a ~age~  ~male/female~ whose age at diagnosis was [unfilled] [Sweat Test] : Sweat Test [Genetic Testing] : Genetic Testing [Siblings with CF] : ~He/She~ has sibling(s) with CF [MSSA] : MSSA [Other: ___] : [unfilled] [___] : the last positive MSSA result was [unfilled] [Daily] : daily cough reported [Last AFB Date: ___] : the AFB was performed  [unfilled] [< 1/4 cup] : less than 1/4 cup of [None] : ~He/She~ has no hemoptysis [Unchanged] : showed no change from previous film [FVC ___] : the FVC was [unfilled] liters [FVC ___] : the FVC was [unfilled] liters [FEV1: ___] : the FEV1 was [unfilled] liters [] : gilberto suárez [Date: ___] : The last HgA1C was performed on [unfilled] [Good] : good [AFB] : the patient had a MAC negative AFB [de-identified] : followed by Transplant at [place] Dr. Cunningham [FreeTextEntry9] : not feeling well [de-identified] : This is a 44 y/o female CF pt who was dx'ed CF at 21 y/o, Pt's younger sister was dx'ed CF at birth. Pt was sweat tested at age 5 and family was told she has false positive sweat test then. Pt grew up having frequent pneumonia, episodes of hemoptysis and recurrent sinus infection. 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 Sweat test on 10/12/98 was 65.3. Pt never had stool Pancrease testing and she does not have s/s of PI. + chronic sinusitis and 2 sinus surgeries, last was 2012.\par Been on Kalydeco since 2014 and reports increased energy level, decreased cough, decreased exacerbation and frequency; prior to Kalydeco, was using IV antibiotics and having exacerbations every 2-3 months along with sinus infections.\par Post Kalydeco, she gets 1-2 exacerbations needed PO antibiotics. Recently increased frequency of exacerbations.\par On Symdeko 4/2018 tolerating w/o adverse effects\par \par Pt has an allergy to chlorahexadine during exacerbation in December, going forward can only use iodine for PICC dressing changes, and sorba view dressing\par \par 6/25/19 Finished Prednisone 5mg daily x 7D\par 7/10/19 Enrolled in [facility] for research at [place]. OFF Ceftaz ON Colistin\par \par Minimal sputum or coughing except with HyperSal. \par Exercises able to use stationary bike 4-5x/week 4-5miles, at low intensity.\par \par GI: has GERD on PPI. Endorses eats spicy foods frequently, and despite PPI daily still has intermittent GERD symptoms. . Denies h/o pancreatitis, gallstones, jaundice. Denies acute/sudden pain. Improved on BID dosing.\par \par OV 9/10/19 Pt is here today for sick visit. reports increased sputum production yellow and white in color, increase frequency of cough, sob and wheezing, more fatigued. States sxs started about 1 week ago when she noticed her sputum went from white to light yellow. Denies fevers, chills, n/v, known sick contacts, hemoptysis. Here with  who is visually upset do to pts sxs and not feeling well\par \par Oct 2019- Today for f/u after completing 21 days of IV abx for CF exacerbation. Endorsing baseline symptoms. WELL VISIT. Is eligible for Trikafta.  Currently on Prednisone 5mg daily. Gained 4lbs in 1 month\par \par ACT: Al/HS BID/Pulm BID\par Using HS 7% and is tolerating well. \par [place] vest and using it 30 minutes BID\par Using Aerobika and ACT called RC-cornet multiple times a day\par Previously on Alosno now cycling ON Ceftazidine with OFF colistin. feels like Colistin does not help as much\par \par ENT: denies using sinus rinses. +PND using Dymista daily and Afirin 3days a week. \par \par Transplant: f/u with [facility] Cunningham in Jan. Ablation was canceled for [facility] and never rescheduled, had dental exam, pending dermatology skin cancer screening\par \par Pulmonary HPI: Patient's perceived exertion as measured by the Leanne Dyspnea Scale is 3, moderate. Her infection history includes MSSA and Achromobacter 3/7/19 and E. Coli 1/5/17. The last positive MSSA result was 1/5/17. The patient had a MAC negative AFB. The AFB was performed 3/7/19. daily cough reported. Her cough is productive of occasional sputum. She has no hemoptysis. Her last chest xray was was performed 2019. A PFT was performed 3/21/19, the FVC was 2.13/58% liters and the FEV1 was 0.82/28% liters. A PFT was performed 3/7/19, the FVC was 1.93/53 liters and the FEV1 was 0.80/27% liters. Airway clearance therapy orders include acapella b.i.d. and vest b.i.d. The patient's compliance with ACT is good. \par Sinus HPI: sinus pain. \par Gastrointestinal History: constipation, but no pancreatitis, no diarrhea and no steatorrhea. Recent Colonscopy 9/5 reports wnl and repeat in 5 yrs \par Endocrine History: osteopenia, but no CFRD . The last HgA1C was performed on 7/16/18. HgA1C value was 5.3 . A 2 hour OGTT was performed on 7/31/18 . OGTT results were normal. Fasting 86 2hr 86 \par \par  [de-identified] : PND

## 2019-10-25 NOTE — END OF VISIT
[FreeTextEntry3] : I agree with the nurse practitioners history, physical examination and plan of care. I personally elicited a history and examined the patient. See above attestation.\par \par \par  [>50% of Time Spent on Counseling and Coordination of Care for  ___] : Greater than 50% of the encounter time was spent on counseling and coordination of care for [unfilled] [Time Spent: ___ minutes] : I have spent [unfilled] minutes of face to face time with the patient

## 2019-10-25 NOTE — PHYSICAL EXAM
[General Appearance - In No Acute Distress] : no acute distress [Normal Conjunctiva] : the conjunctiva exhibited no abnormalities [General Appearance - Well Developed] : well developed [Normal Oral Mucosa] : normal oral mucosa [Normal Oropharynx] : normal oropharynx [Neck Appearance] : the appearance of the neck was normal [Neck Cervical Mass (___cm)] : no neck mass was observed [Heart Rate And Rhythm] : heart rate was normal and rhythm regular [Apical Impulse] : the apical impulse was normal [Heart Sounds] : normal S1 and S2 [Murmurs] : no murmurs [] : no respiratory distress [Respiration, Rhythm And Depth] : normal respiratory rhythm and effort [Exaggerated Use Of Accessory Muscles For Inspiration] : no accessory muscle use [Abnormal Walk] : normal gait [Abdomen Soft] : soft [Skin Color & Pigmentation] : normal skin color and pigmentation [Oriented To Time, Place, And Person] : oriented to person, place, and time [No Focal Deficits] : no focal deficits [Affect] : the affect was normal [Mood] : the mood was normal [FreeTextEntry1] : crackles bilaterally predominatly lower lobes, R>L

## 2019-10-25 NOTE — H&P ADULT - NSHPLANGLIMITEDENGLISH_GEN_A_CORE
CHIEF COMPLAINT:  Annual well woman exam.    HISTORY OF PRESENT ILLNESS:  Angela Freed is a 55 year old female who is here today for an annual well woman exam.  No LMP recorded. Patient is postmenopausal., age 48, no HRT, no postmenopausal bleeding.   Last pap:10/8/14 neg, HPV neg  History of abnormal pap smears:no  STD concerns:none  No breast concerns. Mammogram last done 10/28/16.  She would like to check every 2 years.      Colonoscopy:2/3/15 with Dr. Massey. Repeat in 10 years.  DEXA:not indicated  Immunizations:reviewd on [facility]IR    Colonoscopy:  2/3/15 Dr. Massey. Colon polyp-insignificant tissue. Repeat in 10 years  DEXA:  Not indicated  Immunizations: Reviewed in [facility] and on [place] Immunization Registry    Other medical concerns include none.    Past Medical History:   Diagnosis Date   • Bunion    • Chronic pain     back, knees   • Depression    • Essential hypertension    • Hypothyroidism 10/2014   • Menopause     no HRT   • Tobacco use        Past Surgical History:   Procedure Laterality Date   • Back surgery      L4-L5   • Colonoscopy w biopsy  February 3, 2015    Dr. Zi Massey. Hyperplastic polyp.    • Foot surgery Left 2018    Dr. Rey Goins   • Tubal ligation      Bilateral       OB History    Para Term  AB Living   3 2 0 0 0 3   SAB TAB Ectopic Molar Multiple Live Births   0 0 0 0 0 0       ALLERGIES:  No Known Allergies    Outpatient Medications Marked as Taking for the 10/25/19 encounter (Office Visit) with Abigail Portillo MD   Medication Sig Dispense Refill   • levothyroxine (SYNTHROID, LEVOTHROID) 50 MCG tablet TAKE 1 TABLET BY MOUTH  DAILY 90 tablet 1   • hydrochlorothiazide (HYDRODIURIL) 25 MG tablet Take 1 tablet by mouth daily. 90 tablet 1   • sertraline (ZOLOFT) 50 MG tablet Take 1 tablet by mouth daily. 90 tablet 1       Family History   Problem Relation Age of Onset   • Arthritis Mother    • Heart disease Father         CABG, DM, h/o testicular  cancer   • Cancer, Breast Sister         Liver cancer, ETOH   • Rheumatoid Arthritis Brother    • Arthritis Brother    • Depression Brother    • Depression Brother    • Depression Brother    • Depression Brother        Social History     Socioeconomic History   • Marital status: /Civil Union     Spouse name: Shiraz, in June 2016   • Number of children: 3   • Years of education: Not on file   • Highest education level: Not on file   Occupational History   • 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  5 cigs per day   Substance and Sexual Activity   • Alcohol use: Yes     Comment: 12 drinks/week   • Drug use: No   • Sexual activity: Not Currently     Partners: Male     Birth control/protection: Surgical     Comment: BTL   Lifestyle   • Physical activity:     Days per week: Not on file     Minutes per session: Not on file   • Stress: Not on file   Relationships   • Social connections:     Talks on phone: Not on file     Gets together: Not on file     Attends Jehovah's witness service: Not on file     Active member of club or organization: Not on file     Attends meetings of clubs or organizations: Not on file     Relationship status: Not on file   • Intimate partner violence:     Fear of current or ex partner: Not on file     Emotionally abused: Not on file     Physically abused: Not on file     Forced sexual activity: Not on file   Other Topics Concern   •  Service Not Asked   • Blood Transfusions Not Asked   • Caffeine Concern Not Asked   • Occupational Exposure Not Asked   • Hobby Hazards Not  Asked   • Sleep Concern Not Asked   • Stress Concern Not Asked   • Weight Concern Not Asked   • Special Diet Not Asked   • Back Care Not Asked   • Exercise Yes     Comment: walking   • Bike Helmet Not Asked   • Seat Belt Yes   • Self-Exams Not Asked   Social History Narrative    Lives with -Shiraz (he works 2nd shift at [email or web address]).     No pets.        Daughter-Sandra graduated [employer] and lives in [place]. (getting masters at [employer]). She has one granddaughter from her-[facility].         Also has twin sons-Luke lives in [place] and Sage lives in [place].       REVIEW OF SYMPTOMS:  CONSTITUTIONAL:  No fever, weight loss, change in appetite.  HEENT:  No visual changes or eye pain.  No hearing loss, ear pain, sore throat, nasal congestion or bloody noses.  HEART:  Patient denies any exertional chest pain, diaphoresis, dyspnea or edema.  PULMONARY:  Patient denies any cough, wheezing, dyspnea.  BREASTS:  No breast skin changes, lumps, pain, nipple discharge or lumps under arms.  GASTROINTESTINAL:  No nausea, vomiting, abdominal pain, change in bowel habits, chronic constipation, diarrhea, blood in stools or black/tarry stools.  GENITAL:  Denies any abnormal vaginal bleeding, discharge or pelvic pain.  URINARY:  The patient denies any dysuria, frequency, hematuria.  SKIN:  Denies any rashes, unusual or changing skin lesions.  NEUROLOGICAL:  Denies any unusual headaches, numbness, tingling, weakness in extremities or dizziness.  Denies lightheadedness or dizziness.  PSYCHIATRIC:  Mood stable  ENDOCRINE:  No polyuria, polydipsia, heat or cold intolerance.  HEMATOLOGICAL:  No easy bruising or unusual bleeding.  MUSCULOSKELETAL:  Some back pain    PHYSICAL EXAM:  Visit Vitals  /80 (BP Location: RUE - Right upper extremity, Cuff Size: Large Adult)   Pulse 80   Temp 97.7 °F (36.5 °C) (Oral)   Resp 12   Ht 5' 6.5\" (1.689 m)   Wt 87.2 kg   BMI 30.56 kg/m²   last weight was 87.5 kg on 8/1/19  GENERAL:   This is an alert, well-nourished female in no acute distress.  HEENT:  Atraumatic.  Normal conjunctivae and lids.  Pupils are equally round and reactive to light.  Extraocular eye movements are intact.  Tympanic membranes and canals are clear.  Nares patent, no drainage.  Throat clear, mucous membranes are moist.  NECK:  Supple, trachea midline, no thyromegaly, no lymphadenopathy, no supraclavicular adenopathy.  LUNGS:  Clear to auscultation, no wheezes, rales or rhonchi.  Normal respiratory effort.  HEART:  Regular rate and rhythm.  No murmurs.  No carotid bruits.  BREASTS: declined  ABDOMEN:  Bowel sounds are normal.  Abdomen is soft, without tenderness, guarding, rebound, mass or hepatosplenomegaly.  Anus/perineum normal.  GENITOURINARY:  Vulva clear, no lesions.  Vagina:  no discharge.  Cervix is normal appearing, no cervical motion tenderness.  ThinPrep pap obtained.   Uterus is mobile, nontender.  No adnexal tenderness or masses noted.  RECTAL:  Deferred.  LYMPHATIC:  No lymphadenopathy.  MUSCULOSKELETAL:  Normal gait.  No edema.  SKIN:  Warm and dry.  Nailbeds pink and capillary refill below 3 seconds.  No suspicious lesions or rashes.  NEUROLOGICAL/PSYCHIATRIC:  Alert and oriented to time, place and person.  Answers all questions appropriately and follows all commands correctly.  Normal mood and affect.  No focal deficits.    ASSESSMENT:  1. Well woman exam with routine gynecological exam    2. Breast cancer screening    3. Need for vaccination        PLAN:  Orders Placed This Encounter   • MAMMO Screen w Richard Bilateral     Order Specific Question:   If appropriate for further eval of abnormalities, perform:     Answer:   PERFORM ADD'L DIAG TESTS AND/OR INTERVENTIONAL PROC PER RADIOLOGIST; MAY DO BILATERAL IF INDICATED   • INFLUENZA QUADRIVALENT SPLIT 0.5 ML VACC MULTIDOSE, IM (FLUAVAL,FLUZONE) [47292]   • COMPREHENSIVE METABOLIC PANEL   • CBC with Automated Differential   • THYROID STIMULATING HORMONE   •  Lipid Panel Without Reflex   • Vitamin D -25 Hydroxy   • Thin Prep Pap Test with HPV regardless     Order Specific Question:   Reason for collection?     Answer:   Low Risk - Encounter for screening for malignant neoplasm of cervix Z12.4     Order Specific Question:   What is the specimen source?     Answer:   Endocervical     1. Patient will be notified of all test results.  2. Medications-as above and continue current medications.  3. Labs-per orders.  4. Immunizations-per orders. She knows shingrix is covered and will get at local pharmacy.   5. Preventive screenings-per orders.   6. Regular dental and ophthalmology evaluations as recommended.  7. Body mass index is 30.56 kg/m².  BMI ASSESSMENT/PLAN:  Healthy diet and regular physical activity discussed with her.  8. handout on POA-HC given again  9.Continue to work on tobacco cessation. Has a quit date 11/16/19. Declines need for medication to help.    Appropriate use and side effects of medication discussed with patient.  All questions answered and patient is agreeable to this plan.  Refer to After Visit Summary.  Follow up in 6 months but instructed to return to clinic or call if symptoms are not resolved as discussed, or sooner if worse.        Abigail Portillo MD          No

## 2019-11-20 ENCOUNTER — APPOINTMENT (OUTPATIENT)
Dept: PULMONOLOGY | Facility: CLINIC | Age: 44
End: 2019-11-20
Payer: COMMERCIAL

## 2019-11-20 VITALS
WEIGHT: 186 LBS | SYSTOLIC BLOOD PRESSURE: 144 MMHG | OXYGEN SATURATION: 96 % | RESPIRATION RATE: 16 BRPM | HEART RATE: 121 BPM | DIASTOLIC BLOOD PRESSURE: 83 MMHG | BODY MASS INDEX: 31.76 KG/M2 | TEMPERATURE: 97.9 F | HEIGHT: 64 IN

## 2019-11-20 PROCEDURE — 94010 BREATHING CAPACITY TEST: CPT

## 2019-11-20 PROCEDURE — ZZZZZ: CPT

## 2019-11-20 PROCEDURE — 99215 OFFICE O/P EST HI 40 MIN: CPT | Mod: 25

## 2019-11-20 RX ORDER — TEZACAFTOR AND IVACAFTOR 100-150 MG
100-150 & 150 KIT ORAL
Qty: 1 | Refills: 3 | Status: DISCONTINUED | COMMUNITY
Start: 2018-04-16 | End: 2019-11-20

## 2019-11-20 NOTE — PHYSICAL EXAM
[General Appearance - Well Developed] : well developed [General Appearance - In No Acute Distress] : no acute distress [Normal Conjunctiva] : the conjunctiva exhibited no abnormalities [Normal Oral Mucosa] : normal oral mucosa [Normal Oropharynx] : normal oropharynx [Neck Appearance] : the appearance of the neck was normal [Neck Cervical Mass (___cm)] : no neck mass was observed [Apical Impulse] : the apical impulse was normal [Heart Rate And Rhythm] : heart rate was normal and rhythm regular [Heart Sounds] : normal S1 and S2 [Murmurs] : no murmurs [Respiration, Rhythm And Depth] : normal respiratory rhythm and effort [Exaggerated Use Of Accessory Muscles For Inspiration] : no accessory muscle use [Abdomen Soft] : soft [Abnormal Walk] : normal gait [Skin Color & Pigmentation] : normal skin color and pigmentation [No Focal Deficits] : no focal deficits [Oriented To Time, Place, And Person] : oriented to person, place, and time [Affect] : the affect was normal [Mood] : the mood was normal [Auscultation Breath Sounds / Voice Sounds] : lungs were clear to auscultation bilaterally [Bowel Sounds] : normal bowel sounds [Abdomen Tenderness] : non-tender [] : no hepato-splenomegaly [FreeTextEntry1] : scant crackles at LLL posterior

## 2019-11-20 NOTE — ASSESSMENT
[FreeTextEntry1] : ATTENDING ATTESTATION\par Patient is a 43 y/o female CF pt who was dx'ed CF at 23 y/o, Pt's younger sister was dx'ed CF at birth. Pt was sweat tested at age 5 and family was told she has false positive sweat test then. Pt grew up having frequent pneumonia, episodes of hemoptysis and recurrent sinus infection. 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 Sweat test on 10/12/98 was 65.3. \par \par Here today for pulmonary clearance for possible ablation with Dr. Waase on 12/2/19. Started Trikafta on 11/18/19. WELL VISIT today\par \par Recent history:\par 2/9 started on IV Coli 150 mg and Bactrim\par 2/11 pt reported tingling in finger tips, Coli dose lowered to 100 mg\par 2/13 pt reported s/s of bactrim allergy. Bactrim stopped and pt changed to Imipenem\par 2/14 pt admitted to [facility] for desensitization for bactrim\par did NOT receive IV antibiotics 2/14 to 2/20\par 2/20 pt desensitized to bactrim, tolerated doses well\par 2/22 pt d/c'ed home\par 2/23 pt restarted IV Colistin 100 mg and continued IV Bactrim\par 3/10- contacted on call c/o numbness tingling of hands and feet with combination of Colistin and Cipro. Instructed to hold doses.\par 3/11-restart Cipro and Bactrim only d/c Colistin\par 3/21- currently on 4 weeks of Bactrim, 14 days Cipro\par 3/22-endorsing SOB with bactrim infusion. D/C Bactrim Continue Cipro for 21 days.\par 4/22 - completed 21 day course of IV ceftazidime and colistin\par \par FEV1 improved today 31% from (6/25/19) 27%,  (3/7/19) 27%----->FeV1 31% (8/2018), FEV1 29% 3/27/18; 27% (6/25/19).\par FEV1 has been 32 - 33% in 2016; 39% in 2015. 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 coli. \par \par Sputum history: Achromobacter, MSSA\par Inhaled COLISTIN ON alternating with inhaled ceftazidime OFF.\par \par ACT  using [place] VEST BID and Aerobika multiple times per day\par using HS 7% and tolerating well, continue with Alb/HS/Pulmozyme BID.\par Continue Advair.\par - 3/22 - PT complained of SOB with infusion of IV bactrim, no signs of allergic reaction - STOPPED BACTRIM - s/p 4 weeks total..\par \par - CANNOT USE TIW AZITHROMYCIN AS ANTIINFLAMMATORY GIVEN ALL HER ALLERGY HX, Attempt to decrease Prednisone 5mg from daily to QOD. OSTEOPENIA - CALCIUM CITRATE. ON VITAMIN D. CONSIDER BISPHOSPHONATE. \par \par ALLERGY - pt had skin testing and is NOT allergic to CIPRO/LEVAQUIN, CLINDAMYCIN. \par \par CFTR MODULATOR - Tolerating symdeko, started in 4/2018. Recently started Trikafta on 11/18/19 and tolerating w/o AE. Currently on Day 5 of full dose. 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 Waase. Scheduled for tentative ablation if possible on 12/2/19. PATIENT IS OPTIMIZED FROM RESPIRATORY STANDPOINT FOR EP EVALUATION AND IS AT MILD RISK FROM PULMONARY COMPLICATIONS. \par \par ENT - chronic sinusitis - had followup with ENT. Sinuses clear, no need for surgery.\par PND - Continue Dymista.  \par \par Endocrine - OGTT - normal, this year repeat annually.\par Bone density demonstrated osteopenia, Vitamin D deficiency - patient on vitamin D 4000 IU. On Calcium and MVI\par \par Heme - anemia. Hbg 8.9 from 11 IN 2016 last year. Started Niferix, repeat blood work improved hgb WNL\par \par GI - Had Colonoscopy wnl results reported. Continue pantoprazole for GERD.\par overweight due to chronic prednisone use. \par PT - using bike, O2 sat is 93-95% on bike at RA. \par \par Recieved MMR booster vaccine\par Started Hepatitis B vaccine series (due for shot #3 December 6 months from Booster #1) \par Will administer Prevnar 23 in 1 year\par Still needs Tetanus, and shingles vaccine. On waiting list for shingrix \par Flu shot Sept\par \par f/u in December

## 2019-11-20 NOTE — END OF VISIT
[>50% of Time Spent on Counseling and Coordination of Care for  ___] : Greater than 50% of the encounter time was spent on counseling and coordination of care for [unfilled] [Time Spent: ___ minutes] : I have spent [unfilled] minutes of face to face time with the patient [FreeTextEntry3] : I agree with the nurse practitioners history, physical examination and plan of care. I personally elicited a history and examined the patient. See above attestation.\par \par \par

## 2019-11-20 NOTE — HISTORY OF PRESENT ILLNESS
[___ Month(s) Ago] : [unfilled] month(s) ago [MSSA] : MSSA [Other: ___] : [unfilled] [___] : the last positive MSSA result was [unfilled] [Last AFB Date: ___] : the AFB was performed  [unfilled] [Last home IV Abx: ___] : The most recent course of home IV antibiotics was [unfilled] [Daily] : daily cough reported [< 1/4 cup] : less than 1/4 cup of [None] : ~He/She~ has no hemoptysis [Unchanged] : showed no change from previous film [FVC ___] : the FVC was [unfilled] liters [Date: ___] : was performed [unfilled] [FEV1: ___] : the FEV1 was [unfilled] liters [] : gilberto suárez [Good] : good [AFB] : the patient had a MAC negative AFB [FreeTextEntry9] : not feeling well [de-identified] : followed by Transplant at [place] Dr. Cunningham [de-identified] : This is a 43 y/o female CF pt who was dx'ed CF at 21 y/o, Pt's younger sister was dx'ed CF at birth. Pt was sweat tested at age 5 and family was told she has false positive sweat test then. Pt grew up having frequent pneumonia, episodes of hemoptysis and recurrent sinus infection. 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 Sweat test on 10/12/98 was 65.3. Pt never had stool Pancrease testing and she does not have s/s of PI. + chronic sinusitis and 2 sinus surgeries, last was 2012.\par Been on Kalydeco since 2014 and reports increased energy level, decreased cough, decreased exacerbation and frequency; prior to Kalydeco, was using IV antibiotics and having exacerbations every 2-3 months along with sinus infections.\par Post Kalydeco, she gets 1-2 exacerbations needed PO antibiotics. Recently increased frequency of exacerbations.\par Started Symdeko 4/2018 tolerated, and transitioned to Trikafta 11/8/19. \par \par Pt has an allergy to chlorahexadine during exacerbation in December, going forward can only use iodine for PICC dressing changes, and sorba view dressing\par \par \par Presents today for f/u on Trikafta (11/8/19) and clearance for ablation for WPW\par \par Pulm: Minimal sputum or coughing except with HyperSal prior to Trikafta. Is on day 5 of full dose. Endorsing increased frequency of cough with white/orange phelgm. \par Exercises able to use stationary bike 4-5x/week 4-5miles, at low intensity.\par ACT: Al/HS BID/Pulm BID\par Using HS 7% and is tolerating well. \par [place] vest and using it 30 minutes BID\par Using Aerobika and ACT called RC-cornet multiple times a day\par Previously on Alonso now cycling OFF Ceftazidine with ON colistin. feels like Colistin does not help as much\par \par ENT: denies using sinus rinses. +PND using Dymista daily and Afirin 3days a week. \par \par Transplant: f/u with [facility] Dr. Cunningham in Jan. had dental exam, pending dermatology skin cancer screening \par \par GI: has GERD on PPI. Endorses eats spicy foods frequently, and despite PPI daily still has intermittent GERD symptoms. . Denies h/o pancreatitis, gallstones, jaundice. Denies acute/sudden pain. Improved on BID dosing.

## 2019-11-20 NOTE — REVIEW OF SYSTEMS
[Cough] : cough [SOB on Exertion] : shortness of breath during exertion [see HPI] : see HPI [Heartburn] : heartburn [Negative] : Heme/Lymph [Recent Weight Gain (___ Lbs)] : recent [unfilled] ~Ulb weight gain [Chills] : no chills [Fever] : no fever [Chest Pain] : no chest pain [Vomiting] : no vomiting [Abdominal Pain] : no abdominal pain [Shortness Of Breath] : no shortness of breath [Diarrhea] : no diarrhea

## 2019-11-25 LAB — BACTERIA SPT CF RESP CULT: NORMAL

## 2019-12-10 ENCOUNTER — APPOINTMENT (OUTPATIENT)
Dept: PULMONOLOGY | Facility: CLINIC | Age: 44
End: 2019-12-10
Payer: COMMERCIAL

## 2019-12-10 VITALS
SYSTOLIC BLOOD PRESSURE: 117 MMHG | HEIGHT: 64 IN | OXYGEN SATURATION: 97 % | TEMPERATURE: 97.6 F | WEIGHT: 192 LBS | DIASTOLIC BLOOD PRESSURE: 72 MMHG | RESPIRATION RATE: 17 BRPM | HEART RATE: 90 BPM | BODY MASS INDEX: 32.78 KG/M2

## 2019-12-10 VITALS — WEIGHT: 189 LBS | BODY MASS INDEX: 32.44 KG/M2

## 2019-12-10 PROCEDURE — 99215 OFFICE O/P EST HI 40 MIN: CPT | Mod: 25

## 2019-12-10 PROCEDURE — ZZZZZ: CPT

## 2019-12-10 PROCEDURE — 94010 BREATHING CAPACITY TEST: CPT

## 2019-12-11 ENCOUNTER — RESULT REVIEW (OUTPATIENT)
Age: 44
End: 2019-12-11

## 2019-12-11 LAB
ALBUMIN SERPL ELPH-MCNC: 4.8 G/DL
ALP BLD-CCNC: 57 U/L
ALT SERPL-CCNC: 19 U/L
ANION GAP SERPL CALC-SCNC: 15 MMOL/L
AST SERPL-CCNC: 15 U/L
BASOPHILS # BLD AUTO: 0.08 K/UL
BASOPHILS NFR BLD AUTO: 1.1 %
BILIRUB SERPL-MCNC: 0.3 MG/DL
BUN SERPL-MCNC: 14 MG/DL
CALCIUM SERPL-MCNC: 8.9 MG/DL
CHLORIDE SERPL-SCNC: 105 MMOL/L
CO2 SERPL-SCNC: 22 MMOL/L
CREAT SERPL-MCNC: 0.59 MG/DL
EOSINOPHIL # BLD AUTO: 0.27 K/UL
EOSINOPHIL NFR BLD AUTO: 3.8 %
GLUCOSE SERPL-MCNC: 91 MG/DL
HCT VFR BLD CALC: 37.9 %
HGB BLD-MCNC: 12.1 G/DL
IMM GRANULOCYTES NFR BLD AUTO: 0.1 %
LYMPHOCYTES # BLD AUTO: 1.73 K/UL
LYMPHOCYTES NFR BLD AUTO: 24.4 %
MAN DIFF?: NORMAL
MCHC RBC-ENTMCNC: 31.2 PG
MCHC RBC-ENTMCNC: 31.9 GM/DL
MCV RBC AUTO: 97.7 FL
MONOCYTES # BLD AUTO: 0.51 K/UL
MONOCYTES NFR BLD AUTO: 7.2 %
NEUTROPHILS # BLD AUTO: 4.5 K/UL
NEUTROPHILS NFR BLD AUTO: 63.4 %
PLATELET # BLD AUTO: 367 K/UL
POTASSIUM SERPL-SCNC: 3.9 MMOL/L
PROT SERPL-MCNC: 7.5 G/DL
RBC # BLD: 3.88 M/UL
RBC # FLD: 12 %
SODIUM SERPL-SCNC: 141 MMOL/L
WBC # FLD AUTO: 7.1 K/UL

## 2019-12-11 NOTE — PHYSICAL EXAM
[General Appearance - Well Developed] : well developed [General Appearance - In No Acute Distress] : no acute distress [Normal Conjunctiva] : the conjunctiva exhibited no abnormalities [Normal Oropharynx] : normal oropharynx [Normal Oral Mucosa] : normal oral mucosa [Neck Cervical Mass (___cm)] : no neck mass was observed [Neck Appearance] : the appearance of the neck was normal [Apical Impulse] : the apical impulse was normal [Heart Rate And Rhythm] : heart rate was normal and rhythm regular [Heart Sounds] : normal S1 and S2 [Murmurs] : no murmurs [Exaggerated Use Of Accessory Muscles For Inspiration] : no accessory muscle use [Respiration, Rhythm And Depth] : normal respiratory rhythm and effort [Bowel Sounds] : normal bowel sounds [Abdomen Tenderness] : non-tender [Abdomen Soft] : soft [Abnormal Walk] : normal gait [] : no hepato-splenomegaly [Skin Color & Pigmentation] : normal skin color and pigmentation [No Focal Deficits] : no focal deficits [Affect] : the affect was normal [Oriented To Time, Place, And Person] : oriented to person, place, and time [Mood] : the mood was normal [FreeTextEntry1] : No edema or calf tenderness

## 2019-12-11 NOTE — ASSESSMENT
[FreeTextEntry1] : ATTENDING ATTESTATION\par \par Patient is a 43 y/o female CF pt who was dx'ed CF at 21 y/o, Pt's younger sister was dx'ed CF at birth. Pt was sweat tested at age 5 and family was told she has false positive sweat test then. Pt grew up having frequent pneumonia, episodes of hemoptysis and recurrent sinus infection. 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 Sweat test on 10/12/98 was 65.3. \par \par Here today for well visit. \par Cardio: S/p Ablation for WPW  Dr. Waase on 12/2/19. [facility]\par \par Pulm: Started Trikafta on 11/8/19. Tolerating well \par - CBC with Diff and CMP - LFTS for Trikafta\par - Will taper off Prednisone slowly: Recommended 2.5mg QOD for 2 weeks, 1mg QD for 2 weeks, 0.5mg QD for 2 weeks, then 0.5 QOD for 2 weeks. (if tolerates will continue to taper down)\par - Resume Vest therapy\par \par Recent history:\par 2/9 started on IV Coli 150 mg and Bactrim\par 2/11 pt reported tingling in finger tips, Coli dose lowered to 100 mg\par 2/13 pt reported s/s of bactrim allergy. Bactrim stopped and pt changed to Imipenem\par 2/14 pt admitted to [facility] for desensitization for bactrim\par did NOT receive IV antibiotics 2/14 to 2/20\par 2/20 pt desensitized to bactrim, tolerated doses well\par 2/22 pt d/c'ed home\par 2/23 pt restarted IV Colistin 100 mg and continued IV Bactrim\par 3/10- contacted on call c/o numbness tingling of hands and feet with combination of Colistin and Cipro. Instructed to hold doses.\par 3/11-restart Cipro and Bactrim only d/c Colistin\par 3/21- currently on 4 weeks of Bactrim, 14 days Cipro\par 3/22-endorsing SOB with bactrim infusion. D/C Bactrim Continue Cipro for 21 days.\par 4/22 - completed 21 day course of IV ceftazidime and colistin\par \par FEV1 improved today 33% (12/10/19), 31% from (6/25/19) 27%,  (3/7/19) 27%----->FeV1 31% (8/2018), FEV1 29% 3/27/18; 27% (6/25/19).\par FEV1 has been 32 - 33% in 2016; 39% in 2015. 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 coli. \par \par Sputum history: Achromobacter, MSSA. Most recently NML \par Inhaled COLISTIN OFF alternating with inhaled ceftazidime ON\par - repeated Sputum Cs today \par \par ACT  using [place] VEST BID and Aerobika multiple times per day\par using HS 7% and tolerating well, continue with Alb/HS/Pulmozyme BID.\par Continue Advair.\par - Will go back to vest therapy, (was holding since Ablation)\par - 3/22 - PT complained of SOB with infusion of IV bactrim, no signs of allergic reaction - STOPPED BACTRIM - s/p 4 weeks total..\par \par - CANNOT USE TIW AZITHROMYCIN AS ANTIINFLAMMATORY GIVEN ALL HER ALLERGY HX, Attempt to decrease Prednisone 5mg from daily to QOD. OSTEOPENIA - CALCIUM CITRATE. ON VITAMIN D. CONSIDER BISPHOSPHONATE. \par \par ALLERGY - pt had skin testing and is NOT allergic to CIPRO/LEVAQUIN, CLINDAMYCIN. \par \par CFTR MODULATOR - Tolerating symdeko, started in 4/2018. Recently started Trikafta on 11/18/19 and tolerating w/o AE\par \par LUNG TRANSPLANT - currently following with [place] next visit 12/18\par \par ENT - chronic sinusitis - had followup with ENT. Sinuses clear, no need for surgery. Improvement in sxs with Trikafta\par PND - Continue Dymista.  \par \par Endocrine - OGTT - wnl 2018 repeat annually.\par - Repeat when off Prednisone\par Bone density demonstrated osteopenia, Vitamin D deficiency - patient on vitamin D 4000 IU. On Calcium and MVI\par \par Heme - anemia. Hbg 8.9 from 11 IN 2016 last year. Started Niferix, repeat blood work improved hgb WNL\par \par GI - Had Colonoscopy wnl results reported. Continue pantoprazole for GERD.\par overweight due to chronic prednisone use. \par PT - using bike, O2 sat is 93-95% on bike at RA. \par \par Hep B #3 Booster right im delt \par Will administer Prevnar 23 in 1 year\par Still needs Tetanus, and shingles vaccine. On waiting list for shingrix \par Flu shot Sept 2019\par \par If LFTs wnl f/u with Full PFT Feb

## 2019-12-11 NOTE — HISTORY OF PRESENT ILLNESS
[___ Month(s) Ago] : [unfilled] month(s) ago [___] : the last positive MSSA result was [unfilled] [Other: ___] : [unfilled] [MSSA] : MSSA [Last home IV Abx: ___] : The most recent course of home IV antibiotics was [unfilled] [Last AFB Date: ___] : the AFB was performed  [unfilled] [Daily] : daily cough reported [< 1/4 cup] : less than 1/4 cup of [None] : ~He/She~ has no hemoptysis [Unchanged] : showed no change from previous film [FVC ___] : the FVC was [unfilled] liters [FEV1: ___] : the FEV1 was [unfilled] liters [Date: ___] : was performed [unfilled] [] : gilberto suárez [Good] : good [de-identified] : followed by Transplant at [place] Dr. Cunningham [FreeTextEntry9] : not feeling well [AFB] : the patient had a MAC negative AFB [de-identified] : This is a 45 y/o female CF pt who was dx'ed CF at 23 y/o, Pt's younger sister was dx'ed CF at birth. Pt was sweat tested at age 5 and family was told she has false positive sweat test then. Pt grew up having frequent pneumonia, episodes of hemoptysis and recurrent sinus infection. 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 Sweat test on 10/12/98 was 65.3. Pt never had stool Pancrease testing and she does not have s/s of PI. + chronic sinusitis and 2 sinus surgeries, last was 2012.\par Been on Kalydeco since 2014 and reports increased energy level, decreased cough, decreased exacerbation and frequency; prior to Kalydeco, was using IV antibiotics and having exacerbations every 2-3 months along with sinus infections.\par Post Kalydeco, she gets 1-2 exacerbations needed PO antibiotics. Recently increased frequency of exacerbations.\par Started Symdeko 4/2018 tolerated, and transitioned to Trikafta 11/8/19. \par \par Pt has an allergy to chlorahexadine during exacerbation in December, going forward can only use iodine for PICC dressing changes, and sorba view dressing\par \par Presents today for f/u. Pt has been on Trikafta since 11/8/19. Had ablation for WPW last week. During procedure patient was brought out of anaesthesia to have procedure performed due to location and patient sedation. Bl groin/femoral access no bleeding/bruising/pain. Has f/u with cardio on 1/9/19. Says after the procedure her chest was sore and she felt fatigued. Now improving has not done vest therapy in 1 week. Using aerobika for ACT BID. Had increased sputum and nasal discharge for first two weeks of Trikafta which have since resolved. Would like to come off Prednisone if possible. Denies fevers, chills, abdominal pain, n/v/d, HA, rash, hemoptysis, sinus pressure, sore throat. Tolerating trikafta well. \par \par Pulm: Minimal sputum or coughing except with HyperSal prior to Trikafta.\par Exercises able to use stationary bike 4-5x/week 4-5miles, at low intensity.\par ACT: Al/HS BID/Pulm BID\par Using HS 7% and is tolerating well. \par [place] vest and using it 30 minutes BID (not while recovering from Ablation) \par Using Aerobika and ACT called RC-cornet multiple times a day\par Previously on Alonso now cycling ON Ceftazidine with OFF colistin. feels like Colistin does not help as much\par \par ENT: denies using sinus rinses. +PND using Dymista daily and Afirin 3days a week. \par \par Transplant: f/u with [place] Dr. Cunningham in 12/18/19\par \par GI: has GERD on PPI. Endorses eats spicy foods frequently, and despite PPI daily still has intermittent GERD symptoms. Denies h/o pancreatitis, gallstones, jaundice. Denies acute/sudden pain. Improved on BID dosing.

## 2019-12-11 NOTE — REVIEW OF SYSTEMS
[Recent Weight Gain (___ Lbs)] : recent [unfilled] ~Ulb weight gain [SOB on Exertion] : shortness of breath during exertion [Cough] : cough [see HPI] : see HPI [Heartburn] : heartburn [Negative] : Endocrine [Chills] : no chills [Fever] : no fever [Chest Pain] : no chest pain [Shortness Of Breath] : no shortness of breath [Abdominal Pain] : no abdominal pain [Vomiting] : no vomiting [Diarrhea] : no diarrhea

## 2019-12-11 NOTE — END OF VISIT
[Time Spent: ___ minutes] : I have spent [unfilled] minutes of face to face time with the patient [>50% of Time Spent on Counseling and Coordination of Care for  ___] : Greater than 50% of the encounter time was spent on counseling and coordination of care for [unfilled] [FreeTextEntry3] : I agree with the physician assistant's history, physical examination and plan of care. I personally elicited a history and examined the patient. See above attestation.\par \par \par \par \par

## 2019-12-18 ENCOUNTER — RESULT REVIEW (OUTPATIENT)
Age: 44
End: 2019-12-18

## 2019-12-18 LAB — BACTERIA SPT CF RESP CULT: ABNORMAL

## 2019-12-23 ENCOUNTER — RX RENEWAL (OUTPATIENT)
Age: 44
End: 2019-12-23

## 2020-01-21 ENCOUNTER — OTHER (OUTPATIENT)
Age: 45
End: 2020-01-21

## 2020-01-23 ENCOUNTER — OTHER (OUTPATIENT)
Age: 45
End: 2020-01-23

## 2020-01-28 RX ORDER — COPPER GLUCONATE 2 MG
250 TABLET ORAL 3 TIMES DAILY
Qty: 90 | Refills: 5 | Status: ACTIVE | COMMUNITY
Start: 2019-03-21 | End: 1900-01-01

## 2020-02-06 ENCOUNTER — APPOINTMENT (OUTPATIENT)
Dept: PULMONOLOGY | Facility: CLINIC | Age: 45
End: 2020-02-06
Payer: COMMERCIAL

## 2020-02-06 VITALS
WEIGHT: 193 LBS | TEMPERATURE: 97.7 F | HEIGHT: 64 IN | BODY MASS INDEX: 32.95 KG/M2 | RESPIRATION RATE: 18 BRPM | DIASTOLIC BLOOD PRESSURE: 77 MMHG | OXYGEN SATURATION: 96 % | HEART RATE: 77 BPM | SYSTOLIC BLOOD PRESSURE: 126 MMHG

## 2020-02-06 PROCEDURE — ZZZZZ: CPT

## 2020-02-06 PROCEDURE — 94060 EVALUATION OF WHEEZING: CPT

## 2020-02-06 PROCEDURE — 94729 DIFFUSING CAPACITY: CPT

## 2020-02-06 PROCEDURE — 94726 PLETHYSMOGRAPHY LUNG VOLUMES: CPT

## 2020-02-06 PROCEDURE — 99215 OFFICE O/P EST HI 40 MIN: CPT | Mod: 25

## 2020-02-06 RX ORDER — PREDNISONE 5 MG/1
5 TABLET ORAL
Qty: 30 | Refills: 2 | Status: DISCONTINUED | COMMUNITY
Start: 2019-07-02 | End: 2020-02-06

## 2020-02-06 RX ORDER — PREDNISONE 1 MG/1
1 TABLET ORAL
Qty: 90 | Refills: 0 | Status: DISCONTINUED | COMMUNITY
Start: 2019-12-10 | End: 2020-02-06

## 2020-02-06 NOTE — ASSESSMENT
[FreeTextEntry1] : ATTENDING ATTESTATION\par \par Patient is a 43 y/o female CF pt who was dx'ed CF at 23 y/o, Pt's younger sister was dx'ed CF at birth. Pt was sweat tested at age 5 and family was told she has false positive sweat test then. Pt grew up having frequent pneumonia, episodes of hemoptysis and recurrent sinus infection. 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 Sweat test on 10/12/98 was 65.3. \par \par Here today for well visit. \par Cardio: S/p Ablation for WPW  Dr. Waase on 12/2/19. [facility]P pending repeat stress test\par \par Pulm: Started Trikafta on 11/8/19. Tolerating well. Tapered off prednisone. Doing well at respiratory baseline. \par - Annual labs done today \par - UA and micro today \par - Advised against Lung expander \par - Advised against Elderberry use with Trikafta may interact \par \par Recent history:\par 2/9 started on IV Coli 150 mg and Bactrim\par 2/11 pt reported tingling in finger tips, Coli dose lowered to 100 mg\par 2/13 pt reported s/s of bactrim allergy. Bactrim stopped and pt changed to Imipenem\par 2/14 pt admitted to [facility] for desensitization for bactrim\par did NOT receive IV antibiotics 2/14 to 2/20\par 2/20 pt desensitized to bactrim, tolerated doses well\par 2/22 pt d/c'ed home\par 2/23 pt restarted IV Colistin 100 mg and continued IV Bactrim\par 3/10- contacted on call c/o numbness tingling of hands and feet with combination of Colistin and Cipro. Instructed to hold doses.\par 3/11-restart Cipro and Bactrim only d/c Colistin\par 3/21- currently on 4 weeks of Bactrim, 14 days Cipro\par 3/22-endorsing SOB with bactrim infusion. D/C Bactrim Continue Cipro for 21 days.\par 4/22 - completed 21 day course of IV ceftazidime and colistin\par \par FEV1: 31% (2/6/20), 33% (12/10/19), 31% from (6/25/19) 27%,  (3/7/19) 27%----->FeV1 31% (8/2018), FEV1 29% 3/27/18; 27% (6/25/19).\par FEV1 has been 32 - 33% in 2016; 39% in 2015. 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 coli. \par \par Sputum history: Achromobacter, MSSA. Most recently NML\par Inhaled COLISTIN ON alternating with inhaled Ceftazidime Off\par \par ACT  using [place] VEST BID and Aerobika multiple times per day\par using HS 7% and tolerating well, continue with Alb/HS/Pulmozyme BID.\par \par - 3/22 - PT complained of SOB with infusion of IV bactrim, no signs of allergic reaction - STOPPED BACTRIM - s/p 4 weeks total..\par \par - CANNOT USE TIW AZITHROMYCIN AS ANTIINFLAMMATORY GIVEN ALL HER ALLERGY HX, Attempt to decrease Prednisone 5mg from daily to QOD. OSTEOPENIA - CALCIUM CITRATE. ON VITAMIN D. CONSIDER BISPHOSPHONATE. \par \par ALLERGY - pt had skin testing and is NOT allergic to CIPRO/LEVAQUIN, CLINDAMYCIN. \par \par CFTR MODULATOR - Tolerating symdeko, started in 4/2018. Recently started Trikafta on 11/18/19 and tolerating w/o AE\par \par LUNG TRANSPLANT - currently following with [place] last visit 12/18/19 next visit pending\par \par ENT - chronic sinusitis - had followup with ENT. Sinuses clear, no need for surgery. Improvement in sxs with Trikafta\par PND - Continue Dymista.  \par - CURRENTLY having thrush which has started a few months ago and returns \par - Stop Trellegy Start Stiolto given samples\par - Start Nystatin rinse (avoid PO antifungals due to interaction with Trikafta), start probiotics\par \par Endocrine - OGTT - wnl 2018 repeat annually.\par - Given RX to perform now that off Prednisone\par \par Bone density demonstrated osteopenia, Vitamin D deficiency - patient on vitamin D 4000 IU. On Calcium and MVI\par \par Heme - anemia. Hbg 8.9 from 11 IN 2016 last year. Started Niferix, repeat blood work improved hgb WNL\par \par GI - Had Colonoscopy wnl results reported. Continue pantoprazole for GERD.\par overweight due to chronic prednisone use. \par PT - using bike, O2 sat is 93-95% on bike at [place]. \par \par Hep B #3 Booster right im delt \par Will administer Prevnar 23 in 1 year\par Still needs Tetanus, and shingles vaccine. On waiting list for shingrix \par Flu shot Sept 2019\par  \par f/u in 2-3 months

## 2020-02-06 NOTE — HISTORY OF PRESENT ILLNESS
[___ Month(s) Ago] : [unfilled] month(s) ago [MSSA] : MSSA [Other: ___] : [unfilled] [___] : the last positive MSSA result was [unfilled] [Last AFB Date: ___] : the AFB was performed  [unfilled] [Last home IV Abx: ___] : The most recent course of home IV antibiotics was [unfilled] [Daily] : daily cough reported [< 1/4 cup] : less than 1/4 cup of [None] : ~He/She~ has no hemoptysis [Unchanged] : showed no change from previous film [FVC ___] : the FVC was [unfilled] liters [Date: ___] : was performed [unfilled] [FEV1: ___] : the FEV1 was [unfilled] liters [Good] : good [] : gilberto suárez [Stable] : stable [AFB] : the patient had a MAC negative AFB [de-identified] : followed by Transplant at [place] Dr. Cunningham [de-identified] : This is a 45 y/o female CF pt who was dx'ed CF at 23 y/o, Pt's younger sister was dx'ed CF at birth. Pt was sweat tested at age 5 and family was told she has false positive sweat test then. Pt grew up having frequent pneumonia, episodes of hemoptysis and recurrent sinus infection. 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 Sweat test on 10/12/98 was 65.3. Pt never had stool Pancrease testing and she does not have s/s of PI. + chronic sinusitis and 2 sinus surgeries, last was 2012.\par Been on Kalydeco since 2014 and reports increased energy level, decreased cough, decreased exacerbation and frequency; prior to Kalydeco, was using IV antibiotics and having exacerbations every 2-3 months along with sinus infections.\par Post Kalydeco, she gets 1-2 exacerbations needed PO antibiotics. Recently increased frequency of exacerbations.\par Started Symdeko 4/2018 tolerated, and transitioned to Trikafta 11/8/19. \par \par Pt has an allergy to chlorahexadine during exacerbation in December, going forward can only use iodine for PICC dressing changes, and sorba view dressing\par \par Presents today for f/u. Here today with no acute complaints. Has been tolerating Trikafta well. Reports mucous is less thick when she produces. Chronic cough, sob, PND, with increased clear runny nose since being on Trikafta. She has a trouble spot in her Right lung that resolves with airway clearance. No change in appetite continues to struggle with weight gain. Tapered off Prednisone last dose in January 2020 does not feel any different being off of it. Denies fevers, chills, abdominal pain, n/v/d, HA, rash, hemoptysis, sinus pressure, sore throat. Tolerating trikafta well. \par \par Pulm: Minimal sputum or coughing except with HyperSal prior to Trikafta.\par Exercises able to use stationary bike 4-5x/week 4-5miles, at low intensity.\par ACT: Al/HS BID/Pulm BID\par Using HS 7% and is tolerating well. \par [place] vest and using it 30 minutes BID\par Using Aerobika QD/BID and ACT called RC-cornet multiple times a day, and Lung expander prior to exercise\par Previously on Alonso now cycling OFF Ceftazidine with ON colistin. feels like Colistin does not help as much\par \par ENT: denies using sinus rinses. +PND using Dymista daily and Afirin 3 days a week. \par \par Transplant: f/u with [place] Dr. Cunningham last visit 12/18/19 pending f/u apt\par \par GI: has GERD on PPI. Endorses eats spicy foods frequently, and despite PPI daily still has intermittent GERD symptoms. Denies h/o pancreatitis, gallstones, jaundice. Denies acute/sudden pain. Improved on BID dosing.

## 2020-02-06 NOTE — PHYSICAL EXAM
[General Appearance - Well Developed] : well developed [General Appearance - In No Acute Distress] : no acute distress [Normal Conjunctiva] : the conjunctiva exhibited no abnormalities [Neck Cervical Mass (___cm)] : no neck mass was observed [Neck Appearance] : the appearance of the neck was normal [Heart Sounds] : normal S1 and S2 [Apical Impulse] : the apical impulse was normal [Heart Rate And Rhythm] : heart rate was normal and rhythm regular [Murmurs] : no murmurs [Exaggerated Use Of Accessory Muscles For Inspiration] : no accessory muscle use [Respiration, Rhythm And Depth] : normal respiratory rhythm and effort [Abdomen Soft] : soft [Bowel Sounds] : normal bowel sounds [Abnormal Walk] : normal gait [] : no hepato-splenomegaly [Abdomen Tenderness] : non-tender [No Focal Deficits] : no focal deficits [Skin Color & Pigmentation] : normal skin color and pigmentation [Mood] : the mood was normal [Affect] : the affect was normal [Oriented To Time, Place, And Person] : oriented to person, place, and time [Auscultation Breath Sounds / Voice Sounds] : lungs were clear to auscultation bilaterally [FreeTextEntry1] : lungs clear

## 2020-02-06 NOTE — END OF VISIT
[>50% of Time Spent on Counseling and Coordination of Care for  ___] : Greater than 50% of the encounter time was spent on counseling and coordination of care for [unfilled] [Time Spent: ___ minutes] : I have spent [unfilled] minutes of face to face time with the patient [FreeTextEntry3] : I agree with the physician assistant's history, physical examination and plan of care. I personally elicited a history and examined the patient. See above attestation.

## 2020-02-06 NOTE — REVIEW OF SYSTEMS
[Recent Weight Gain (___ Lbs)] : recent [unfilled] ~Ulb weight gain [SOB on Exertion] : shortness of breath during exertion [Cough] : cough [see HPI] : see HPI [Heartburn] : heartburn [Negative] : Heme/Lymph [Fever] : no fever [Chills] : no chills [Chest Pain] : no chest pain [Shortness Of Breath] : no shortness of breath [Abdominal Pain] : no abdominal pain [Vomiting] : no vomiting [Diarrhea] : no diarrhea

## 2020-02-07 LAB
ALBUMIN SERPL ELPH-MCNC: 4.5 G/DL
ALP BLD-CCNC: 58 U/L
ALT SERPL-CCNC: 18 U/L
ANION GAP SERPL CALC-SCNC: 13 MMOL/L
APPEARANCE: CLEAR
AST SERPL-CCNC: 17 U/L
BASOPHILS # BLD AUTO: 0.07 K/UL
BASOPHILS NFR BLD AUTO: 1.1 %
BILIRUB SERPL-MCNC: 0.4 MG/DL
BILIRUBIN URINE: NEGATIVE
BLOOD URINE: NEGATIVE
BUN SERPL-MCNC: 9 MG/DL
CALCIUM SERPL-MCNC: 9.1 MG/DL
CHLORIDE SERPL-SCNC: 102 MMOL/L
CHOLEST SERPL-MCNC: 192 MG/DL
CHOLEST/HDLC SERPL: 2.9 RATIO
CO2 SERPL-SCNC: 24 MMOL/L
COLOR: YELLOW
CREAT SERPL-MCNC: 0.55 MG/DL
EOSINOPHIL # BLD AUTO: 0.19 K/UL
EOSINOPHIL NFR BLD AUTO: 2.9 %
ESTIMATED AVERAGE GLUCOSE: 105 MG/DL
GLUCOSE QUALITATIVE U: NEGATIVE
GLUCOSE SERPL-MCNC: 87 MG/DL
HBA1C MFR BLD HPLC: 5.3 %
HCT VFR BLD CALC: 39.8 %
HDLC SERPL-MCNC: 65 MG/DL
HGB BLD-MCNC: 12.2 G/DL
IMM GRANULOCYTES NFR BLD AUTO: 0.2 %
KETONES URINE: NEGATIVE
LDLC SERPL CALC-MCNC: 108 MG/DL
LEUKOCYTE ESTERASE URINE: NEGATIVE
LYMPHOCYTES # BLD AUTO: 0.99 K/UL
LYMPHOCYTES NFR BLD AUTO: 15.1 %
MAN DIFF?: NORMAL
MCHC RBC-ENTMCNC: 30.3 PG
MCHC RBC-ENTMCNC: 30.7 GM/DL
MCV RBC AUTO: 98.8 FL
MONOCYTES # BLD AUTO: 0.61 K/UL
MONOCYTES NFR BLD AUTO: 9.3 %
NEUTROPHILS # BLD AUTO: 4.7 K/UL
NEUTROPHILS NFR BLD AUTO: 71.4 %
NITRITE URINE: NEGATIVE
PH URINE: 7
PLATELET # BLD AUTO: 323 K/UL
POTASSIUM SERPL-SCNC: 4.1 MMOL/L
PROT SERPL-MCNC: 7 G/DL
PROTEIN URINE: NEGATIVE
RBC # BLD: 4.03 M/UL
RBC # FLD: 13 %
SODIUM SERPL-SCNC: 140 MMOL/L
SPECIFIC GRAVITY URINE: 1.02
TRIGL SERPL-MCNC: 89 MG/DL
UROBILINOGEN URINE: NORMAL
WBC # FLD AUTO: 6.57 K/UL

## 2020-02-11 LAB
24R-OH-CALCIDIOL SERPL-MCNC: 65.3 PG/ML
A FLAVUS AB FLD QL: NEGATIVE
A FUMIGATUS AB FLD QL: NEGATIVE
A NIGER AB FLD QL: NEGATIVE
A-TOCOPHEROL VIT E SERPL-MCNC: 11.5 MG/L
BETA+GAMMA TOCOPHEROL SERPL-MCNC: 1.8 MG/L
CREAT SPEC-SCNC: 89 MG/DL
MICROALBUMIN 24H UR DL<=1MG/L-MCNC: <1.2 MG/DL
MICROALBUMIN/CREAT 24H UR-RTO: NORMAL MG/G
VIT A SERPL-MCNC: 35.7 UG/DL

## 2020-02-18 LAB — MENADIONE SERPL-MCNC: 0.62 NG/ML

## 2020-02-24 LAB — TOTAL IGE SMQN RAST: 1805 KU/L

## 2020-02-27 ENCOUNTER — RX RENEWAL (OUTPATIENT)
Age: 45
End: 2020-02-27

## 2020-02-27 RX ORDER — SYRINGE, DISPOSABLE, 1 ML
20G X 1" SYRINGE, EMPTY DISPOSABLE MISCELLANEOUS
Qty: 100 | Refills: 2 | Status: ACTIVE | COMMUNITY
Start: 2020-02-27 | End: 1900-01-01

## 2020-04-23 ENCOUNTER — RX RENEWAL (OUTPATIENT)
Age: 45
End: 2020-04-23

## 2020-05-01 ENCOUNTER — APPOINTMENT (OUTPATIENT)
Dept: PULMONOLOGY | Facility: CLINIC | Age: 45
End: 2020-05-01

## 2020-05-01 ENCOUNTER — APPOINTMENT (OUTPATIENT)
Dept: PULMONOLOGY | Facility: CLINIC | Age: 45
End: 2020-05-01
Payer: COMMERCIAL

## 2020-05-01 DIAGNOSIS — D50.9 IRON DEFICIENCY ANEMIA, UNSPECIFIED: ICD-10-CM

## 2020-05-01 DIAGNOSIS — E66.3 OVERWEIGHT: ICD-10-CM

## 2020-05-01 DIAGNOSIS — E55.9 VITAMIN D DEFICIENCY, UNSPECIFIED: ICD-10-CM

## 2020-05-01 PROCEDURE — 99215 OFFICE O/P EST HI 40 MIN: CPT | Mod: 95

## 2020-05-04 PROBLEM — E66.3 OVERWEIGHT: Status: ACTIVE | Noted: 2019-10-24

## 2020-05-04 PROBLEM — D50.9 IRON DEFICIENCY ANEMIA: Status: ACTIVE | Noted: 2018-07-31

## 2020-05-04 NOTE — HISTORY OF PRESENT ILLNESS
[Medical Office: ([facility])___] : at the medical office located in  [Home] : at home, [unfilled] , at the time of the visit. [Patient] : the patient [Self] : self [___ Month(s) Ago] : [unfilled] month(s) ago [MSSA] : MSSA [Stable] : stable [Other: ___] : [unfilled] [___] : the last positive MSSA result was [unfilled] [Last AFB Date: ___] : the AFB was performed  [unfilled] [Last home IV Abx: ___] : The most recent course of home IV antibiotics was [unfilled] [< 1/4 cup] : less than 1/4 cup of [Daily] : daily cough reported [None] : ~He/She~ has no hemoptysis [FVC ___] : the FVC was [unfilled] liters [Unchanged] : showed no change from previous film [Date: ___] : was performed [unfilled] [FEV1: ___] : the FEV1 was [unfilled] liters [] : gilberto suárez [Good] : good [FreeTextEntry2] : DISHA MONTERROSO [AFB] : the patient had a MAC negative AFB [de-identified] : Verbal consent given on 5/1/2020 and at time 11 am by the patient, DISHA MONTERROSO.\par \par This is a 43 y/o female CF pt who was dx'ed CF at 21 y/o, + 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 Sweat test on 10/12/98 was 65.3. Pt never had stool Pancrease testing and she does not have s/s of PI. + chronic sinusitis and 2 sinus surgeries, last was 2012.\par Was on Kalydeco. Started Symdeko 4/2018 tolerated, and transitioned to Trikafta 11/8/19. \par Patient is practicing social distancing during COVID-19 crisis.\par \par \par Presents today for f/u. Here today with no acute complaints. Has been tolerating Trikafta well. Reports mucous is less thick when she produces. Denies fevers, chills, abdominal pain, n/v/d, HA, rash, hemoptysis, sinus pressure, sore throat. \par Off prednisone since November, 2019. \par \par ACT: Al/HS BID/Pulm BID\par Using HS 7% and is tolerating well. \par [place] vest and using it 30 minutes BID\par Using Aerobika QD/BID and ACT\par cycling OFF Ceftazidine with ON colistin. feels like Colistin does not help as much. \par \par Pt with Oral thrush for prolonged time; improved on fluconazole. Now on lozenges and nystatin mouth wash. Back on full dose Trikafta. Feels thrush is worse with inh ceftazidime, but hard to tell. \par \par ENT: denies using sinus rinses. +PND using Dymista daily and Afirin 3 days a week. Due to COVID crisis, pt would like to defer ENT consultation and ID for thrush. Denies dysphagia.\par \par Transplant: f/u with Peterson Cunningham. Still needs repeat stress test. \par \par  [de-identified] : followed by Transplant at [place] Dr. Cunningham

## 2020-05-04 NOTE — REVIEW OF SYSTEMS
[Cough] : cough [see HPI] : see HPI [SOB on Exertion] : shortness of breath during exertion [Heartburn] : heartburn [Negative] : Heme/Lymph [Fever] : no fever [Feeling Poorly] : not feeling poorly [Chills] : no chills [Recent Weight Gain (___ Lbs)] : recent [unfilled] ~Ulb weight gain [Chest Pain] : no chest pain [Recent Weight Loss (___ Lbs)] : recent [unfilled] ~Ulb weight loss [Shortness Of Breath] : no shortness of breath [Abdominal Pain] : no abdominal pain [Vomiting] : no vomiting [Diarrhea] : no diarrhea

## 2020-05-04 NOTE — PHYSICAL EXAM
[General Appearance - Well Developed] : well developed [Normal Appearance] : normal appearance [Well Groomed] : well groomed [General Appearance - In No Acute Distress] : no acute distress

## 2020-05-04 NOTE — ASSESSMENT
[FreeTextEntry1] : ATTENDING ATTESTATION\par \par Verbal consent given on 5/1/2020 and at time 11 am by the patient, DISHA MONTERROSO.\par \par This is a 45 y/o female CF pt who was dx'ed CF at 21 y/o, + 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 Sweat test on 10/12/98 was 65.3. Pt never had stool Pancrease testing and she does not have s/s of PI. + chronic sinusitis and 2 sinus surgeries, last was 2012.\par Was on Kalydeco. Started Symdeko 4/2018 tolerated, and transitioned to Trikafta 11/8/19. \par Patient is practicing social distancing during COVID-19 crisis.\par Presents today for f/u. Here today with no acute complaints. Has been tolerating Trikafta well. Reports mucous is less thick when she produces. Denies fevers, chills, abdominal pain, n/v/d, HA, rash, hemoptysis, sinus pressure, sore throat. \par Off prednisone since November, 2019. \par \par ENT: denies using sinus rinses. +PND using Dymista daily and Afirin 3 days a week. Due to COVID crisis, pt would like to defer ENT consultation and ID for thrush. Denies dysphagia.\par \par Here today for well visit. \par Cardio: S/p Ablation for WPW  Dr. Waase on 12/2/19. NYP pending repeat stress test\par \par Pulm: Started Trikafta on 11/8/19. Tolerating well, CONTINUE FULL DOSE NOW THAT SHE IS OFF FLUCONAZOLE ORAL. Tapered off prednisone. Doing well at respiratory baseline. \par -Will need bloodwork next OV when clinic is open. Pt is deferring bloodwork during COVID19 crisis. I d/w pt that if absolutely needed, we can arrange for blood work at local lab, home draw or in clinic as all facilities are taking precautions during COVID19 crisis with PPE worn by phlebotomist. \par  \par FEV1: 31% (2/6/20), 33% (12/10/19), 31% from (6/25/19) 27%,  (3/7/19) 27%----->FeV1 31% (8/2018), FEV1 29% 3/27/18; 27% (6/25/19).\par FEV1 has been 32 - 33% in 2016; 39% in 2015. 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 coli. \par \par Sputum history: Achromobacter, MSSA. Most recently NML\par Inhaled COLISTIN ON alternating with inhaled Ceftazidime Off.\par Pt with Oral thrush for prolonged time; improved on fluconazole. Now on lozenges and nystatin mouth wash. Back on full dose Trikafta. Feels thrush is worse with inh ceftazidime, but hard to tell. \par ----BECAUSE OF PERSISTENT THRUSH - need to rule out DM when able to, now off steroids (inhaled and systemic), and need to assess association with inh antibiotics. STOP INH CEFTAZIDIME, IF TOLERABLE, MAY CONTINUE TO HOLD INH CEFTAZIDIME. ON NEXT "ON" MONTH, START INH COLISTIN WHICH PER PT, BENEFITS FROM WITH INCREASED EXPECTORATION. \par PT WILL F./U WITH ME SOONER IF NOT TOLERATING "OFF" CYCYLE.\par \par ACT  using [place] VEST BID and Aerobika multiple times per day\par using HS 7% and tolerating well, continue with Alb/HS/Pulmozyme BID.\par \par ALLERGY - pt had skin testing and is NOT allergic to CIPRO/LEVAQUIN, CLINDAMYCIN. \par CANNOT USE TIW AZITHROMYCIN AS ANTIINFLAMMATORY GIVEN ALL HER ALLERGY HX,\par \par CFTR MODULATOR - Tolerating symdeko, started in 4/2018. Recently started Trikafta on 11/18/19 and tolerating w/o AE\par \par LUNG TRANSPLANT - currently following with [place] last visit 12/18/19 next visit pending\par \par ENT - chronic sinusitis - had followup with ENT. Sinuses clear, no need for surgery. Improvement in sxs with Trikafta\par PND - Continue Dymista.  \par - CURRENTLY having thrush which has started a few months ago and returns \par - Stop Klaudia Started Stiolto, tolerating. was given samples\par \par Endocrine - OGTT - wnl 2018 repeat annually.\par - Given RX to perform now that off Prednisone.\par OSTEOPENIA - CALCIUM CITRATE. ON VITAMIN D. CONSIDER BISPHOSPHONATE. \par Vitamin D deficiency - patient on vitamin D 4000 IU. On Calcium and MVI\par \par Heme - anemia. Hbg 8.9 from 11 IN 2016 last year. Started Niferix, repeat blood work improved hgb WNL\par \par GI - Had Colonoscopy wnl results reported. Continue pantoprazole for GERD.\par overweight but now off CS and lost 10-15 pounds. \par Not documented PI - consider repeating fecal elastase if abdominal symptoms come up. Pt reports acidophilus "helps her bloating". pt having normal BMs. \par . \par PT - using bike, O2 sat is 93-95% on bike at [place]. \par \par Hep B #3 Booster right im delt \par Will administer Prevnar 23 in 1 year\par Still needs Tetanus, and shingles vaccine. On waiting list for shingrix \par Flu shot Sept 2019\par  \par f/u in 1 month or sooner if needed.  [CF (Cystic Fibrosis) (277.00\E84.9)] : normal variant

## 2020-05-04 NOTE — END OF VISIT
[>50% of Time Spent on Counseling and Coordination of Care for  ___] : Greater than 50% of the encounter time was spent on counseling and coordination of care for [unfilled] [Time Spent: ___ minutes] : I have spent [unfilled] minutes of face to face time with the patient [FreeTextEntry3] : I personally elicited the history from the patient via telehealth visit..\par

## 2020-05-07 VITALS — BODY MASS INDEX: 31.07 KG/M2 | HEIGHT: 64 IN | WEIGHT: 182 LBS

## 2020-05-28 NOTE — PHYSICAL THERAPY INITIAL EVALUATION ADULT - LEVEL OF INDEPENDENCE: STAND/SIT, REHAB EVAL
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 Patient was also diuresed w/ lasix 40 bid w/ symptomatic improvement. Heme onc was c/s and determined there was no role for a bone biopsy at this moment. 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 Patient is hemodynamically stable for discharge and pending further work up outpatient w/ GI, heme, and PCP.
independent

## 2020-08-13 ENCOUNTER — APPOINTMENT (OUTPATIENT)
Dept: PULMONOLOGY | Facility: CLINIC | Age: 45
End: 2020-08-13
Payer: COMMERCIAL

## 2020-08-13 ENCOUNTER — LABORATORY RESULT (OUTPATIENT)
Age: 45
End: 2020-08-13

## 2020-08-13 VITALS
HEIGHT: 64 IN | WEIGHT: 183 LBS | DIASTOLIC BLOOD PRESSURE: 79 MMHG | HEART RATE: 75 BPM | BODY MASS INDEX: 31.24 KG/M2 | RESPIRATION RATE: 17 BRPM | OXYGEN SATURATION: 97 % | SYSTOLIC BLOOD PRESSURE: 149 MMHG | TEMPERATURE: 98.1 F

## 2020-08-13 DIAGNOSIS — R20.2 PARESTHESIA OF SKIN: ICD-10-CM

## 2020-08-13 PROCEDURE — 99215 OFFICE O/P EST HI 40 MIN: CPT

## 2020-08-13 NOTE — HISTORY OF PRESENT ILLNESS
[Stable] : stable [___ Month(s) Ago] : [unfilled] month(s) ago [MSSA] : MSSA [___] : the last positive MSSA result was [unfilled] [Other: ___] : [unfilled] [Last AFB Date: ___] : the AFB was performed  [unfilled] [Last home IV Abx: ___] : The most recent course of home IV antibiotics was [unfilled] [None] : ~He/She~ has no hemoptysis [Unchanged] : showed no change from previous film [FVC ___] : the FVC was [unfilled] liters [FEV1: ___] : the FEV1 was [unfilled] liters [Date: ___] : was performed [unfilled] [] : gilberto suárez [Good] : good [Home] : at home, [unfilled] , at the time of the visit. [Medical Office: ([facility])___] : at the medical office located in  [FreeTextEntry2] : DISHA MONTERROSO [Self] : self [Patient] : the patient [Occasional] : occasional [AFB] : the patient had a MAC negative AFB [de-identified] : followed by Transplant at [place] Dr. Cunningham [de-identified] : This is a 45 y/o female CF pt who was dx'ed CF at 21 y/o, + 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 Sweat test on 10/12/98 was 65.3. Pt never had stool Pancrease testing and she does not have s/s of PI. + chronic sinusitis and 2 sinus surgeries, last was 2012.\par Was on Kalydeco. Started Symdeko 4/2018 tolerated, and transitioned to Trikafta 11/8/19. \par \par Presents today for follow-up. Overall, doing well. No exacerbations. \par Denies fevers/chills,, HA, dizziness, CP, SOB, cough, palpitations, abd pain, N/V, bowel changes, ext swelling \par Off prednisone since November, 2019. \par \par Only new concern is bilateral foot paresthesias for past 3 days, intermittent during the day, relieved with movement/walking, worse at rest, which patient attributes to the Colistin. \par Had similar episode when patient was on IV Colistin in 2019, which resolved with dose reduction.\par On Colistin every other month, currently ON \par \par Additionally, pt feels that while she is on Colistin, she is producing increased sputum, and able to expectorate more, which she reports did not occur while not on it. \par \par ACT: Al/HS BID/Pulm BID\par Using HS 7% and is tolerating well. \par [place] vest and using it 30 minutes BID\par Using Aerobika QD/BID and ACT\par Previously cycling Ceftazidine with ON colistin; however due to concern for thrush - cycled off of Ceftazidine and now only on Colistin. \par \par Pt with hx of Oral thrush for prolonged time, however now improved over past several months when transitioned off of INH Ceftazidime. Continues on Nystatin rinse, no longer on Fluconazole. Back on full dose Trikafta. \par \par ENT: denies using sinus rinses. +PND using Dymista daily and Afirin 3 days a week.  Denies dysphagia.\par \par Transplant: f/u with Peterson Cunningham. Underwent stress test on 7/22/20 \par

## 2020-08-13 NOTE — END OF VISIT
[] : Fellow [Time Spent: ___ minutes] : I have spent [unfilled] minutes of time on the encounter. [FreeTextEntry3] : on Inhaled Colistin - unclear if this is causing her neuropathy in bilateral feet. Also pt reports bronchospasm but believes it really helps her clear her airways and expectorates a lot of thick discolored mucous after colistin. Will Dilute colistin with 5 mL saline from 4 mL. Would like to rule out CFRD as a cause for neuropathy as well. script provided. Gave prevnar 13 today. \par Thrush resolved on nystatin and off inhaled ceftazidime. [>50% of the face to face encounter time was spent on counseling and/or coordination of care for ___] : Greater than 50% of the face to face encounter time was spent on counseling and/or coordination of care for [unfilled]

## 2020-08-13 NOTE — ASSESSMENT
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 Sweat test on 10/12/98 was 65.3. Pt never had stool Pancrease testing and she does not have s/s of PI. + chronic sinusitis and 2 sinus surgeries, last was 2012.\par Was on Kalydeco. Started Symdeko 4/2018 tolerated, and transitioned to Trikafta 11/8/19. \par \par Presents today for follow-up. Overall, doing well. No exacerbations. \par Denies fevers/chills,, HA, dizziness, CP, SOB, cough, palpitations, abd pain, N/V, bowel changes, ext swelling \par Off prednisone since November, 2019. \par \par Only new concern is bilateral foot paresthesias for past 3 days, intermittent during the day, relieved with movement/walking, worse at rest, which patient attributes to the Colistin. \par \par Had similar episode when patient was on IV Colistin in 2019, which resolved with dose reduction.\par On Colistin every other month, currently ON \par \par ACT: Al/HS BID/Pulm BID\par Using HS 7% and is tolerating well. \par [place] vest and using it 30 minutes BID\par Using Aerobika QD/BID and ACT\par Previously cycling Ceftazidine with ON colistin; however due to concern for thrush - cycled off of Ceftazidine and now only on Colistin. \par \par - Will plan for diluting Colistin further to help avoid excess irritation\par \par Given new-onset paresthesias, will check for diabetic neuropathy\par - Obtain Oral Glucose tolerance test \par \par Pt with hx of Oral thrush for prolonged time, however now improved over past several months when transitioned off of INH Ceftazidime. Continues on Nystatin rinse, no longer on Fluconazole. Back on full dose Trikafta. \par \par ENT: denies using sinus rinses. +PND using Dymista daily and Afirin 3 days a week.  Denies dysphagia.\par \par Transplant: f/u with Peterson Cunningham. Underwent stress test on 7/22/20 \par \par Pulm: Started Trikafta on 11/8/19. Tolerating well, CONTINUE FULL DOSE NOW THAT SHE IS OFF FLUCONAZOLE ORAL. Tapered off prednisone. Doing well at respiratory baseline. \par  \par FEV1: 33% (6/2020), 31% (2/6/20), 33% (12/10/19), 31% from (6/25/19) 27%,  (3/7/19) 27%----->FeV1 31% (8/2018), FEV1 29% 3/27/18; 27% (6/25/19).\par FEV1 has been 32 - 33% in 2016; 39% in 2015. 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 coli. \par \par Sputum history: Achromobacter, MSSA. Most recently NML\par Inhaled COLISTIN ON \par Pt with Oral thrush for prolonged time; now much improved following Ceftazidime discontinuation; continues on Nystatin, no longer on Fluconazole \par \par ACT  using [place] VEST BID and Aerobika multiple times per day\par using HS 7% and tolerating well, continue with Alb/HS/Pulmozyme BID.\par \par ALLERGY - pt had skin testing and is NOT allergic to CIPRO/LEVAQUIN, CLINDAMYCIN. \par CANNOT USE TIW AZITHROMYCIN AS ANTIINFLAMMATORY GIVEN ALL HER ALLERGY HX,\par \par CFTR MODULATOR - Tolerating symdeko, started in 4/2018. Recently started Trikafta on 11/18/19 and tolerating w/o AE\par \par LUNG TRANSPLANT - currently following with [place] last visit 12/18/19 next visit pending\par \par ENT - chronic sinusitis - had followup with ENT. Sinuses clear, no need for surgery. Improvement in sxs with Trikafta\par PND - Continue Dymista.  \par - Stopped Klaudia Started Stiolto, tolerating\par \par Endocrine - OGTT - wnl 2018 repeat annually.\par - Given RX to perform now that off Prednisone.\par OSTEOPENIA - CALCIUM CITRATE. ON VITAMIN D. CONSIDER BISPHOSPHONATE. \par Vitamin D deficiency - patient on vitamin D 4000 IU. On Calcium and MVI\par \par Heme - anemia. Hbg 8.9 from 11 IN 2016 last year. Started Niferix, repeat blood work improved hgb WNL\par \par GI - Had Colonoscopy wnl results reported. Continue pantoprazole for GERD.\par overweight but now off CS and lost 10-15 pounds. \par Not documented PI - consider repeating fecal elastase if abdominal symptoms come up. Pt reports acidophilus "helps her bloating". pt having normal BMs. \par . \par PT - using bike, O2 sat is 93-95% on bike at [place]. \par \par Hep B 3-dose series complete \par Administered Prevar 13 today. Needs Pneumvax 23 in August 2021\par Still needs Tetanus, and shingles vaccine. On [facility] waiting list for shingrix \par Flu shot Sept 2019

## 2020-08-13 NOTE — REVIEW OF SYSTEMS
[Recent Weight Loss (___ Lbs)] : recent [unfilled] ~Ulb weight loss [Recent Weight Gain (___ Lbs)] : recent [unfilled] ~Ulb weight gain [SOB on Exertion] : shortness of breath during exertion [Cough] : cough [Heartburn] : heartburn [Negative] : Gastrointestinal [Chills] : no chills [Fever] : no fever [Feeling Poorly] : not feeling poorly [Chest Pain] : no chest pain [Shortness Of Breath] : no shortness of breath [Abdominal Pain] : no abdominal pain [Diarrhea] : no diarrhea [Vomiting] : no vomiting

## 2020-08-13 NOTE — PHYSICAL EXAM
[General Appearance - Well Developed] : well developed [General Appearance - In No Acute Distress] : no acute distress [Well Groomed] : well groomed [Normal Appearance] : normal appearance [Normal Conjunctiva] : the conjunctiva exhibited no abnormalities [Eyelids - No Xanthelasma] : the eyelids demonstrated no xanthelasmas [No Oral Cyanosis] : no oral cyanosis [No Oral Pallor] : no oral pallor [Normal Oral Mucosa] : normal oral mucosa [Jugular Venous Distention Increased] : there was no jugular-venous distention [Neck Appearance] : the appearance of the neck was normal [Neck Cervical Mass (___cm)] : no neck mass was observed [Thyroid Nodule] : there were no palpable thyroid nodules [Heart Rate And Rhythm] : heart rate was normal and rhythm regular [Thyroid Diffuse Enlargement] : the thyroid was not enlarged [Heart Sounds] : normal S1 and S2 [Heart Sounds Gallop] : no gallops [Murmurs] : no murmurs [Heart Sounds Pericardial Friction Rub] : no pericardial rub [Bowel Sounds] : normal bowel sounds [Abdomen Soft] : soft [Abdomen Tenderness] : non-tender [Abnormal Walk] : normal gait [Abdomen Mass (___ Cm)] : no abdominal mass palpated [Musculoskeletal - Swelling] : no joint swelling seen [Motor Tone] : muscle strength and tone were normal [Nail Clubbing] : no clubbing of the fingernails [] : no ischemic changes [Petechial Hemorrhages (___cm)] : no petechial hemorrhages [Cyanosis, Localized] : no localized cyanosis [Sensation] : the sensory exam was normal to light touch and pinprick [Deep Tendon Reflexes (DTR)] : deep tendon reflexes were 2+ and symmetric [Oriented To Time, Place, And Person] : oriented to person, place, and time [No Focal Deficits] : no focal deficits [Affect] : the affect was normal [Impaired Insight] : insight and judgment were intact [FreeTextEntry1] : Bronchial breath sounds bilaterally, upperlobe-predominant

## 2020-08-18 LAB
A FLAVUS AB FLD QL: NEGATIVE
A FUMIGATUS AB FLD QL: NEGATIVE
A FUMIGATUS IGE QN: 23.6 KUA/L
A NIGER AB FLD QL: NEGATIVE
ALBUMIN SERPL ELPH-MCNC: 5 G/DL
ALP BLD-CCNC: 87 U/L
ALT SERPL-CCNC: 19 U/L
ANION GAP SERPL CALC-SCNC: 13 MMOL/L
AST SERPL-CCNC: 19 U/L
BACTERIA SPT CF RESP CULT: NORMAL
BASOPHILS # BLD AUTO: 0.07 K/UL
BASOPHILS NFR BLD AUTO: 1.1 %
BILIRUB SERPL-MCNC: 0.8 MG/DL
BUN SERPL-MCNC: 12 MG/DL
CALCIUM SERPL-MCNC: 9.7 MG/DL
CHLORIDE SERPL-SCNC: 101 MMOL/L
CO2 SERPL-SCNC: 26 MMOL/L
CREAT SERPL-MCNC: 0.62 MG/DL
DEPRECATED A FUMIGATUS IGE RAST QL: 4
EOSINOPHIL # BLD AUTO: 0.52 K/UL
EOSINOPHIL NFR BLD AUTO: 8 %
GLUCOSE SERPL-MCNC: 89 MG/DL
HCT VFR BLD CALC: 42.4 %
HGB BLD-MCNC: 13.1 G/DL
IMM GRANULOCYTES NFR BLD AUTO: 0.2 %
LYMPHOCYTES # BLD AUTO: 1.41 K/UL
LYMPHOCYTES NFR BLD AUTO: 21.6 %
MAN DIFF?: NORMAL
MCHC RBC-ENTMCNC: 29.9 PG
MCHC RBC-ENTMCNC: 30.9 GM/DL
MCV RBC AUTO: 96.8 FL
MONOCYTES # BLD AUTO: 0.51 K/UL
MONOCYTES NFR BLD AUTO: 7.8 %
NEUTROPHILS # BLD AUTO: 4.01 K/UL
NEUTROPHILS NFR BLD AUTO: 61.3 %
PLATELET # BLD AUTO: 319 K/UL
POTASSIUM SERPL-SCNC: 4.3 MMOL/L
PROT SERPL-MCNC: 7.7 G/DL
RBC # BLD: 4.38 M/UL
RBC # FLD: 13.4 %
SARS-COV-2 IGG SERPL IA-ACNC: 0.09 INDEX
SARS-COV-2 IGG SERPL QL IA: NEGATIVE
SODIUM SERPL-SCNC: 139 MMOL/L
TOTAL IGE SMQN RAST: 1007 KU/L
WBC # FLD AUTO: 6.53 K/UL

## 2020-08-26 ENCOUNTER — RESULT REVIEW (OUTPATIENT)
Age: 45
End: 2020-08-26

## 2020-08-26 ENCOUNTER — APPOINTMENT (OUTPATIENT)
Dept: CT IMAGING | Facility: IMAGING CENTER | Age: 45
End: 2020-08-26
Payer: COMMERCIAL

## 2020-08-26 ENCOUNTER — OUTPATIENT (OUTPATIENT)
Dept: OUTPATIENT SERVICES | Facility: HOSPITAL | Age: 45
LOS: 1 days | End: 2020-08-26
Payer: COMMERCIAL

## 2020-08-26 DIAGNOSIS — E84.9 CYSTIC FIBROSIS, UNSPECIFIED: ICD-10-CM

## 2020-08-26 PROCEDURE — 71250 CT THORAX DX C-: CPT

## 2020-08-26 PROCEDURE — 71250 CT THORAX DX C-: CPT | Mod: 26

## 2020-10-02 ENCOUNTER — APPOINTMENT (OUTPATIENT)
Dept: PULMONOLOGY | Facility: CLINIC | Age: 45
End: 2020-10-02
Payer: COMMERCIAL

## 2020-10-02 PROCEDURE — 99215 OFFICE O/P EST HI 40 MIN: CPT | Mod: 95

## 2020-10-05 NOTE — END OF VISIT
[FreeTextEntry3] : I personally elicited the history from the patient via telehealth visit..\par Tolerating Diluted colistin with 5 mL saline from 4 mL and says neuropathy is improved. \par Still needs OGTT to rule out CFRD as a cause for neuropathy. \par Pt would like Aerobika at next OV. check CBC with diff and IgE then at 6th week aric off inhaled colistin. Pt says she brings up more sputum when ON inh colistin.\par trial of dexilant for GERD.\par give Flu shot in Nov.11/3 3 pm  \par Thrush resolved on nystatin and off inhaled ceftazidime.

## 2020-10-05 NOTE — REVIEW OF SYSTEMS
[Fever] : no fever [Chills] : no chills [Feeling Poorly] : not feeling poorly [Chest Pain] : no chest pain [Shortness Of Breath] : no shortness of breath [Abdominal Pain] : no abdominal pain [Vomiting] : no vomiting [Diarrhea] : no diarrhea

## 2020-10-05 NOTE — ASSESSMENT
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 Sweat test on 10/12/98 was 65.3. Pt never had stool Pancrease testing and she does not have s/s of PI. + chronic sinusitis and 2 sinus surgeries, last was 2012.\par Was on Kalydeco. Started Symdeko 4/2018 tolerated, and transitioned to Trikafta 11/8/19. \par \par Presents today for acute visit on tele.  Pt seen today reporting 3-4 days of increased GERD, burping, flatulence and bloating. Pt called office 2 days ago reporting these symptom. Pt was taking Pepcid OTC BID, gasx and Omperazole 20mg QD (which we increased to 40mg). Also took tums once this week. Pt reported some loose stools earlier this week which has since resolved. Feels at respiratory baseline. \par \par Off prednisone since November, 2019. \par \par ACT: Al/HS BID/Pulm BID\par Using HS 7% and is tolerating well. \par [place] vest and using it 30 minutes BID\par Using Aerobika QD/BID and ACT\par Previously cycling Ceftazidine with ON colistin; however due to concern for thrush - cycled off of Ceftazidine and now only on Colistin. \par \par Bilateral foot paresthesias have resolved with dilution of Colistin using 5mL of NaCl to nebulize.\par Had similar episode when patient was on IV Colistin in 2019, which resolved with dose reduction.\par Colistin every other month, currently ON HOLD UNTIL IgE/CBC with diff labs result\par - Go for BW end of October while off Colistin for 1 month \par \par Given new-onset paresthesias, will check for diabetic neuropathy\par - Obtain Oral Glucose tolerance test REINFORCED AGAIN TODAY \par \par Pt with hx of Oral thrush for prolonged time, however now improved over past several months when transitioned off of INH Ceftazidime. Continues on Nystatin rinse, no longer on Fluconazole. Back on full dose Trikafta. \par - Cont Nystatin as Needed \par \par ENT: denies using sinus rinses. +PND using Dymista daily and Afirin 3 days a week.  Denies dysphagia.\par \par Pulm: Started Trikafta on 11/8/19. Tolerating well, CONTINUE FULL DOSE NOW THAT SHE IS OFF FLUCONAZOLE ORAL. Tapered off prednisone. Doing well at respiratory baseline. \par  \par FEV1: 33% (6/2020), 31% (2/6/20), 33% (12/10/19), 31% from (6/25/19) 27%,  (3/7/19) 27%----->FeV1 31% (8/2018), FEV1 29% 3/27/18; 27% (6/25/19).\par FEV1 has been 32 - 33% in 2016; 39% in 2015. 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 coli. \par \par Sputum history: Achromobacter, MSSA. Most recently NML\par Inhaled COLISTIN HOLDING\par \par ACT  using [place] VEST BID and Aerobika multiple times per day\par using HS 7% and tolerating well, continue with Alb/HS/Pulmozyme BID.\par \par ALLERGY - pt had skin testing and is NOT allergic to CIPRO/LEVAQUIN, CLINDAMYCIN. \par CANNOT USE TIW AZITHROMYCIN AS ANTIINFLAMMATORY GIVEN ALL HER ALLERGY HX,\par \par CFTR MODULATOR - Tolerating symdeko, started in 4/2018. Trikafta on 11/18/19 and tolerating w/o AE\par \par LUNG TRANSPLANT - currently following with [facility] last visit 12/18/19 next visit pending\par Transplant: f/u with [facility] Dr. Cunningham. Underwent stress test on 7/22/20 \par \par ENT - chronic sinusitis - had followup with ENT. Sinuses clear, no need for surgery. Improvement in sxs with Trikafta\par PND - Continue Dymista.  \par - Stopped Trellegy Started Stiolto, tolerating\par \par Endocrine - OGTT - wnl 2018 repeat annually. HAS RX\par OSTEOPENIA - CALCIUM CITRATE. ON VITAMIN D. CONSIDER BISPHOSPHONATE. \par Vitamin D deficiency - patient on vitamin D 4000 IU. On Calcium and MVI\par \par Heme - anemia. Hbg 8.9 from 11 IN 2016 last year. Started Niferix, repeat blood work improved hgb WNL H/H wnl past year on labs\par \par GI - Had Colonoscopy wnl results reported. OTC PPI and H2 blocker, + 4 days Increase GERD, bloating, Flatulence. \par overweight but now off CS and lost 10-15 pounds. \par Not documented PI - consider repeating fecal elastase if abdominal symptoms come up. Self Stopped Probiotics 2 days ago\par - Stop Omeprazole OTC \par - Start Dexilant 30mg QD \par - Cont Pepcid PRN \par - Continue bland diet \par - Repeat fecal Elastase test at f/u  \par .- if persists will need to see GI and consider endoscopy \par \par PT - using bike, O2 sat is 93-95% on bike at [place]. \par \par HCM:\par Hep B 3-dose series complete \par Administered Prevar 13 8/2020\par Needs Pneumvax 23 in August 2021\par Still needs Tetanus, and shingles vaccine. [facility] \par Flu shot Sept 2019\par \par f/u in office in Nov

## 2020-10-05 NOTE — HISTORY OF PRESENT ILLNESS
[Home] : at home, [unfilled] , at the time of the visit. 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 Cunningham [de-identified] : This is a 43 y/o female CF pt who was dx'ed CF at 23 y/o, + 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 Sweat test on 10/12/98 was 65.3. Pt never had stool Pancrease testing and she does not have s/s of PI. + chronic sinusitis and 2 sinus surgeries, last was 2012.\par Was on Kalydeco. Started Symdeko 4/2018 tolerated, and transitioned to Trikafta 11/8/19. \par \par Pt seen today on telemedicine for acute visit. Pt seen today reporting 3-4 days of increased GERD, burping, flatulence and bloating. Pt called office 2 days ago reporting these symptom. Pt was taking Pepcid OTC BID, gasx and Omperazole 20mg QD (which we increased to 40mg). Also took tums once this week. Pt reported some loose stools earlier this week which has since resolved. Trying [place] diet, no carbonation, had one small BM today formed. Appetite is good. Stopped probiotics 2 days ago to see if it helped but notices no change. Respiratory wise feels well at baseline. Cough with clear mucous feels like she produces less when off colistin. Pt is holding colistin to assess IgE levels OFF of it for one month will have blood work at the end of October. \par Denies fevers, chills, nausea, vomiting, chest pain or tightness, no shoulder/back pain or numbness/tingling, change in cough, wheeze, sob. \par Off prednisone since November, 2019. \par \par Parasthesias have resolved with dilution of Colistin using 5mL of NaCl to nebulize. Bilateral foot paresthesias in the past for a few days intermittent during the day, relieved with movement/walking, worse at rest, which patient attributes to the Colistin. Had similar episode when patient was on IV Colistin in 2019, which resolved with dose reduction.\par Colistin QOM ON HOLD UNTIL BLOODWORK \par \par Additionally, pt feels that while she is on Colistin, she is producing increased sputum, and able to expectorate more, which she reports did not occur while not on it. \par \par ACT: Al/HS BID/Pulm BID\par Using HS 7% and is tolerating well. \par [place] vest and using it 30 minutes BID\par Using Aerobika QD/BID and ACT\par Previously cycling Ceftazidine with ON colistin; however due to concern for thrush - cycled off of Ceftazidine and now only on Colistin. \par \par Pt with hx of Oral thrush for prolonged time, however now improved over past several months when transitioned off of INH Ceftazidime. Continues on Nystatin rinse, no longer on Fluconazole. Back on full dose Trikafta. \par \par ENT: denies using sinus rinses. +PND using Dymista daily and Afirin 3 days a week.  Denies dysphagia.\par \par Transplant: f/u with [facility]. Underwent stress test on 7/22/20 \par

## 2020-10-15 ENCOUNTER — RX RENEWAL (OUTPATIENT)
Age: 45
End: 2020-10-15

## 2020-10-27 ENCOUNTER — NON-APPOINTMENT (OUTPATIENT)
Age: 45
End: 2020-10-27

## 2020-10-28 ENCOUNTER — NON-APPOINTMENT (OUTPATIENT)
Age: 45
End: 2020-10-28

## 2020-11-03 ENCOUNTER — MED ADMIN CHARGE (OUTPATIENT)
Age: 45
End: 2020-11-03

## 2020-11-03 ENCOUNTER — APPOINTMENT (OUTPATIENT)
Dept: PULMONOLOGY | Facility: CLINIC | Age: 45
End: 2020-11-03
Payer: COMMERCIAL

## 2020-11-03 ENCOUNTER — LABORATORY RESULT (OUTPATIENT)
Age: 45
End: 2020-11-03

## 2020-11-03 VITALS
BODY MASS INDEX: 31.07 KG/M2 | HEIGHT: 64 IN | TEMPERATURE: 97.3 F | SYSTOLIC BLOOD PRESSURE: 125 MMHG | WEIGHT: 182 LBS | RESPIRATION RATE: 16 BRPM | HEART RATE: 72 BPM | DIASTOLIC BLOOD PRESSURE: 57 MMHG | OXYGEN SATURATION: 97 %

## 2020-11-03 DIAGNOSIS — D64.9 ANEMIA, UNSPECIFIED: ICD-10-CM

## 2020-11-03 PROCEDURE — 99215 OFFICE O/P EST HI 40 MIN: CPT | Mod: 25

## 2020-11-03 PROCEDURE — 99072 ADDL SUPL MATRL&STAF TM PHE: CPT

## 2020-11-03 PROCEDURE — 90686 IIV4 VACC NO PRSV 0.5 ML IM: CPT

## 2020-11-03 PROCEDURE — G0008: CPT

## 2020-11-03 NOTE — PHYSICAL EXAM
[General Appearance - Well Developed] : well developed [General Appearance - In No Acute Distress] : no acute distress [Normal Conjunctiva] : the conjunctiva exhibited no abnormalities [Normal Oral Mucosa] : normal oral mucosa [Normal Oropharynx] : normal oropharynx [Neck Appearance] : the appearance of the neck was normal [Neck Cervical Mass (___cm)] : no neck mass was observed [Apical Impulse] : the apical impulse was normal [Heart Rate And Rhythm] : heart rate was normal and rhythm regular [Heart Sounds] : normal S1 and S2 [Murmurs] : no murmurs [Respiration, Rhythm And Depth] : normal respiratory rhythm and effort [Exaggerated Use Of Accessory Muscles For Inspiration] : no accessory muscle use [Auscultation Breath Sounds / Voice Sounds] : lungs were clear to auscultation bilaterally [Bowel Sounds] : normal bowel sounds [Abdomen Soft] : soft [Abdomen Tenderness] : non-tender [] : no hepato-splenomegaly [Abnormal Walk] : normal gait [Skin Color & Pigmentation] : normal skin color and pigmentation [No Focal Deficits] : no focal deficits [Oriented To Time, Place, And Person] : oriented to person, place, and time [Affect] : the affect was normal [Mood] : the mood was normal

## 2020-11-04 NOTE — HISTORY OF PRESENT ILLNESS
[___ Month(s) Ago] : [unfilled] month(s) ago [Stable] : stable [MSSA] : MSSA [Other: ___] : [unfilled] [___] : the last positive MSSA result was [unfilled] [Last AFB Date: ___] : the AFB was performed  [unfilled] [Last home IV Abx: ___] : The most recent course of home IV antibiotics was [unfilled] [Occasional] : occasional [None] : ~He/She~ has no hemoptysis [Unchanged] : showed no change from previous film [FVC ___] : the FVC was [unfilled] liters [Date: ___] : was performed [unfilled] [FEV1: ___] : the FEV1 was [unfilled] liters [] : gilberto suárez [Good] : good [Home] : at home, [unfilled] , at the time of the visit. 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 Cunningham [de-identified] : This is a 43 y/o female CF pt who was dx'ed CF at 21 y/o, + 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 Sweat test on 10/12/98 was 65.3. Pt never had stool Pancrease testing and she does not have s/s of PI. + chronic sinusitis and 2 sinus surgeries, last was 2012.\par Was on Kalydeco. Started Symdeko 4/2018 tolerated, and transitioned to Trikafta 11/8/19. \par \par Pt seen today for follow up. Reports complaints of increased GERD. Currently taking Omeprazole 20 mg BID and Pepcid  20 mg BID. Patient also endorses 2 BM's daily- one formed stool followed by a loose stool ~ 45 min-1 hour later. Trying [place] diet, no carbonation. Appetite is good. Has been tolerating Trikafta well. Respiratory wise feels well at baseline. Cough with clear mucous feels like she produces less when off Colistin. Presently OFF Colistin x 2 months. \par Denies fevers, chills, nausea, vomiting, chest pain or tightness, no shoulder/back pain or numbness/tingling, change in cough, wheeze, sob. \par Off prednisone since November, 2019. \par \par Parasthesias have resolved with dilution of Colistin using 5mL of NaCl to nebulize. Bilateral foot paresthesias in the past for a few days intermittent during the day, relieved with movement/walking, worse at rest, which patient attributes to the Colistin. Had similar episode when patient was on IV Colistin in 2019, which resolved with dose reduction.\par \par Additionally, pt feels that while she is on Colistin, she is producing increased sputum, and able to expectorate more, which she reports did not occur while not on it. \par \par ACT: Al/HS BID/Pulm BID\par Using HS 7% and is tolerating well. \par [place] vest and using it 30 minutes BID\par Using Aerobika QD/BID and ACT\par Previously cycling Ceftazidine with colistin.\par \par Pt with hx of Oral thrush for prolonged time, however now improved over past several months when transitioned off of INH Ceftazidime. Continues on Nystatin rinse, no longer on Fluconazole. Back on full dose Trikafta. \par \par ENT: denies using sinus rinses. +PND using Dymista daily and Afirin 3 days a week.  Denies dysphagia.\par \par Transplant: f/u with [facility]. Underwent stress test on 7/22/20 \par

## 2020-11-04 NOTE — ASSESSMENT
[FreeTextEntry1] : ATTENDING ATTESTATION\par \par This is a 45 y/o female CF pt who was dx'ed CF at 23 y/o, + 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 Sweat test on 10/12/98 was 65.3. Pt never had stool Pancrease testing and she does not have s/s of PI. + chronic sinusitis and 2 sinus surgeries, last was 2012.\par Was on Kalydeco. Started Symdeko 4/2018 tolerated, and transitioned to Trikafta 11/8/19. \par \par Pt seen today for follow up. Reports complaints of increased GERD. Currently taking Omeprazole 20 mg BID and Pepcid  20 mg BID. Patient also endorses 2 BM's daily- one formed stool followed by a loose stool. Trying [place] diet, no carbonation. Appetite is good. Has been tolerating Trikafta well. Respiratory wise feels well at baseline. Presently OFF Colistin x 2 months. \par Off prednisone since November, 2019. \par \par ACT: Al/HS BID/Pulm BID\par Using HS 7% and is tolerating well. \par [place] vest and using it 30 minutes BID\par Using Aerobika QD/BID and ACT\par Previously cycling Ceftazidine with Colistin. Inhaled ceftazidime was held due to frequent OP thrush which has improved. Held colistin inh for 2 months due to respiratory intolerance - repeat IgE 500s from 1000s (8/2020), and 1800 (2/2020) - pt also overall feels better being OFF colistin. May be allergic/bronchospastic response to inh colistin. Would continue to monitor for Achromobacter and if persists, may need to resume inh antibiotic or if clinically symptomatic. \par \par RESOLVED Bilateral foot paresthesias with dilution of Colistin using 5mL of NaCl to nebulize.\par Had similar episode when patient was on IV Colistin in 2019, which resolved with dose reduction.\par \par Given new-onset paresthesias, will check for diabetic neuropathy\par - Obtain Oral Glucose tolerance test.\par \par Pt with hx of Oral thrush for prolonged time, however now improved over past several months when transitioned off of INH Ceftazidime. Continues on Nystatin rinse. Back on full dose Trikafta. \par - Cont Nystatin as Needed \par \par Pulm: Started Trikafta on 11/8/19. Tolerating well, CONTINUE FULL DOSE NOW THAT SHE IS OFF FLUCONAZOLE ORAL. Tapered off prednisone. Doing well at respiratory baseline. \par LFT normal (11/3/2020)\par \par  \par FEV1: 33% (6/2020), 31% (2/6/20), 33% (12/10/19), 31% from (6/25/19) 27%,  (3/7/19) 27%----->FeV1 31% (8/2018), FEV1 29% 3/27/18; 27% (6/25/19).\par FEV1 has been 32 - 33% in 2016; 39% in 2015. 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 coli. \par \par Sputum history: Achromobacter, MSSA. Most recently NML\par Inhaled COLISTIN HOLDING\par \par ACT using [place] VEST BID and Aerobika multiple times per day.\par using HS 7% and tolerating well, continue with Alb/HS/Pulmozyme BID.\par - Stopped Trellegy Started Stiolto, tolerating\par Excellent exercise tolerance, biking. \par \par ALLERGY - pt had skin testing and is NOT allergic to CIPRO/LEVAQUIN, CLINDAMYCIN. \par CANNOT USE TIW AZITHROMYCIN AS ANTIINFLAMMATORY GIVEN ALL HER ALLERGY HX,\par \par CFTR MODULATOR - Tolerating symdeko, started in 4/2018. Trikafta on 11/18/19 and tolerating w/o AE\par \par LUNG TRANSPLANT - currently following with [place] last visit 12/18/19 next visit pending\par Transplant: f/u with [place] Dr. Cunningham. Underwent stress test on 7/22/20 \par \par ENT - chronic sinusitis - had followup with ENT. Sinuses clear, no need for surgery. Improvement in sxs with Trikafta\par PND - Continue Dymista.  \par \par Endocrine - OGTT - wnl 2018 repeat annually. HAS RX\par OSTEOPENIA - CALCIUM CITRATE. ON VITAMIN D. CONSIDER BISPHOSPHONATE. \par Vitamin D deficiency - patient on vitamin D 4000 IU. On Calcium and MVI\par \par Heme - anemia. Hbg 8.9 from 11 IN 2016 last year. Started Niferix, repeat blood work improved hgb WNL H/H wnl past year on labs\par \par GI - Had Colonoscopy wnl results reported. OTC PPI and H2 blocker with minimal relief of symptoms. \par -Dexilant not covered. Stop Pepcid, continue Omeprazole 20 mg BID. \par -Continue bland diet. \par -Referred to GI (Dr. Gore)  to r/o other causes.\par -Ordered fecal elastase to be completed at home and dropped off to lab. \par \par \par PT - using [place], O2 sat is 93-95% on bike at [place]. \par \par HCM:\par Hep B 3-dose series complete \par Administered Prevar 13 8/2020\par Needs Pneumvax 23 in August 2021\par Still needs Tetanus, and shingles vaccine. [facility] \par \par -IgE, Eos, CMP, CBC sent today. \par -Flu shot completed today in office. \par -Sputum C/S sent today. \par -F/u in office in 1 month with spirometry.

## 2020-11-04 NOTE — REVIEW OF SYSTEMS
[see HPI] : see HPI [Cough] : cough [SOB on Exertion] : shortness of breath during exertion [Heartburn] : heartburn [Negative] : Heme/Lymph [Fever] : no fever [Chills] : no chills [Feeling Poorly] : not feeling poorly [Chest Pain] : no chest pain [Shortness Of Breath] : no shortness of breath [Abdominal Pain] : no abdominal pain [Vomiting] : no vomiting [Diarrhea] : no diarrhea

## 2020-11-04 NOTE — END OF VISIT
[] : Fellow [Time Spent: ___ minutes] : I have spent [unfilled] minutes of time on the encounter. [>50% of the face to face encounter time was spent on counseling and/or coordination of care for ___] : Greater than 50% of the face to face encounter time was spent on counseling and/or coordination of care for [unfilled] [FreeTextEntry3] : I agree with the physician assistant's history, physical examination and plan of care. I personally elicited a history and examined the patient. See above attestation.\par \par \par

## 2020-11-05 LAB
ALBUMIN SERPL ELPH-MCNC: 4.9 G/DL
ALP BLD-CCNC: 91 U/L
ALT SERPL-CCNC: 20 U/L
ANION GAP SERPL CALC-SCNC: 19 MMOL/L
AST SERPL-CCNC: 19 U/L
BASOPHILS # BLD AUTO: 0.05 K/UL
BASOPHILS NFR BLD AUTO: 0.8 %
BILIRUB SERPL-MCNC: 0.7 MG/DL
BUN SERPL-MCNC: 12 MG/DL
CALCIUM SERPL-MCNC: 9.5 MG/DL
CHLORIDE SERPL-SCNC: 103 MMOL/L
CO2 SERPL-SCNC: 19 MMOL/L
CREAT SERPL-MCNC: 0.56 MG/DL
EOSINOPHIL # BLD AUTO: 0.35 K/UL
EOSINOPHIL NFR BLD AUTO: 5.8 %
GLUCOSE SERPL-MCNC: 81 MG/DL
HCT VFR BLD CALC: 41.1 %
HGB BLD-MCNC: 12.6 G/DL
IMM GRANULOCYTES NFR BLD AUTO: 0.2 %
LYMPHOCYTES # BLD AUTO: 1.59 K/UL
LYMPHOCYTES NFR BLD AUTO: 26.4 %
MAN DIFF?: NORMAL
MCHC RBC-ENTMCNC: 29.9 PG
MCHC RBC-ENTMCNC: 30.7 GM/DL
MCV RBC AUTO: 97.6 FL
MONOCYTES # BLD AUTO: 0.47 K/UL
MONOCYTES NFR BLD AUTO: 7.8 %
NEUTROPHILS # BLD AUTO: 3.55 K/UL
NEUTROPHILS NFR BLD AUTO: 59 %
PLATELET # BLD AUTO: 301 K/UL
POTASSIUM SERPL-SCNC: 4 MMOL/L
PROT SERPL-MCNC: 7.5 G/DL
RBC # BLD: 4.21 M/UL
RBC # FLD: 12.7 %
SODIUM SERPL-SCNC: 140 MMOL/L
TOTAL IGE SMQN RAST: 509 KU/L
WBC # FLD AUTO: 6.02 K/UL

## 2020-11-06 ENCOUNTER — NON-APPOINTMENT (OUTPATIENT)
Age: 45
End: 2020-11-06

## 2020-11-17 ENCOUNTER — NON-APPOINTMENT (OUTPATIENT)
Age: 45
End: 2020-11-17

## 2020-11-23 ENCOUNTER — NON-APPOINTMENT (OUTPATIENT)
Age: 45
End: 2020-11-23

## 2020-11-29 ENCOUNTER — APPOINTMENT (OUTPATIENT)
Dept: DISASTER EMERGENCY | Facility: CLINIC | Age: 45
End: 2020-11-29

## 2020-11-29 DIAGNOSIS — Z01.818 ENCOUNTER FOR OTHER PREPROCEDURAL EXAMINATION: ICD-10-CM

## 2020-11-30 LAB — SARS-COV-2 N GENE NPH QL NAA+PROBE: NOT DETECTED

## 2020-12-03 ENCOUNTER — APPOINTMENT (OUTPATIENT)
Dept: PULMONOLOGY | Facility: CLINIC | Age: 45
End: 2020-12-03
Payer: COMMERCIAL

## 2020-12-03 VITALS
SYSTOLIC BLOOD PRESSURE: 130 MMHG | WEIGHT: 186 LBS | BODY MASS INDEX: 31.76 KG/M2 | OXYGEN SATURATION: 97 % | DIASTOLIC BLOOD PRESSURE: 90 MMHG | TEMPERATURE: 97.7 F | HEART RATE: 78 BPM | HEIGHT: 64 IN

## 2020-12-03 PROCEDURE — ZZZZZ: CPT

## 2020-12-03 PROCEDURE — 94060 EVALUATION OF WHEEZING: CPT

## 2020-12-03 PROCEDURE — 99215 OFFICE O/P EST HI 40 MIN: CPT | Mod: 25

## 2020-12-03 PROCEDURE — 99072 ADDL SUPL MATRL&STAF TM PHE: CPT

## 2020-12-03 PROCEDURE — 94799 UNLISTED PULMONARY SVC/PX: CPT

## 2020-12-08 NOTE — ASSESSMENT
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 Sweat test on 10/12/98 was 65.3. Pt never had stool Pancrease testing and she does not have s/s of PI. + chronic sinusitis and 2 sinus surgeries, last was 2012.\par Was on Kalydeco. Started Symdeko 4/2018 tolerated, and transitioned to Trikafta 11/8/19. \par \par Pt seen today for follow up. Reports her GERD and abdominal discomfort has resolved, now having BM every 1-2d. No symptoms of thrush for >1 mo but continues to take daily Nystatin mouthwash. Appetite is good. Has been tolerating Trikafta well. Respiratory wise feels well at baseline. Presently OFF Colistin x 3 months. \par Off prednisone since November, 2019. \par \par ACT: Al/HS BID/Pulm BID\par Using HS 7% and is tolerating well. \par [place] vest and using it 30 minutes BID\par Using Aerobika QD/BID and ACT\par Previously cycling Ceftazidine with Colistin. Inhaled ceftazidime was held due to frequent OP thrush which has improved. Held colistin inh for 3 months due to respiratory intolerance - repeat IgE 500s from 1000s (8/2020), and 1800 (2/2020) - pt also overall feels better being OFF colistin. May be allergic/bronchospastic response to inh colistin.  Achromobacter persisting on culture 11/3.\par -will order inh imipenem \par \par RESOLVED Bilateral foot paresthesias with dilution of Colistin using 5mL of NaCl to nebulize.\par Had similar episode when patient was on IV Colistin in 2019, which resolved with dose reduction.\par \par Given new-onset paresthesias, will check for diabetic neuropathy\par - OGTT normal \par \par Pt with hx of Oral thrush for prolonged time, however now improved over past several months when transitioned off of INH Ceftazidime. Continues to use daily Nystatin rinse. Back on full dose Trikafta. \par - Patient will trial DC of nystatin rinse\par - If symptoms reoccur with DC of nystatin rinse, patient will see ENT for evaluation   \par \par Pulm: Started Trikafta on 11/8/19. Tolerating well, CONTINUE FULL DOSE NOW THAT SHE IS OFF FLUCONAZOLE ORAL. Tapered off prednisone. Doing well at respiratory baseline. \par LFT normal (11/3/2020)\par \par FEV1: 33% (12/2020) (33% (6/2020), 31% (2/6/20), 33% (12/10/19), 31% from (6/25/19) 27%,  (3/7/19) 27%----->FeV1 31% (8/2018), FEV1 29% 3/27/18; 27% (6/25/19).\par FEV1 has been 32 - 33% in 2016; 39% in 2015. 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 coli. \par \par Sputum history: Achromobacter, MSSA. Most recently NML\par Off Inhaled COLISTIN\par \par ACT using [place] VEST BID and Aerobika multiple times per day.\par using HS 7% and tolerating well, continue with Alb/HS/Pulmozyme BID.\par - Tolerating Stiolto well\par Excellent exercise tolerance, biking. \par \par ALLERGY - pt had skin testing and is NOT allergic to CIPRO/LEVAQUIN, CLINDAMYCIN. \par CANNOT USE TIW AZITHROMYCIN AS ANTIINFLAMMATORY GIVEN ALL HER ALLERGY HX,\par \par CFTR MODULATOR - Tolerating symdeko, started in 4/2018. Trikafta on 11/18/19 and tolerating w/o AE\par \par LUNG TRANSPLANT - currently following with [place] last visit 12/18/19 next visit pending\par Transplant: f/u with [place] Dr. Cunningham. Underwent stress test on 7/22/20 \par \par ENT - chronic sinusitis - had followup with ENT. Sinuses clear, no need for surgery. Improvement in sxs with Trikafta\par PND - Continue Dymista.  \par \par Endocrine - OGTT - wnl 2020 repeat annually. \par OSTEOPENIA - CALCIUM CITRATE. ON VITAMIN D. CONSIDER BISPHOSPHONATE. \par Vitamin D deficiency - patient on vitamin D 4000 IU. On Calcium and MVI\par \par Heme - anemia. Hbg 12.6. Started Niferix, repeat blood work improved hgb WNL H/H wnl past year on labs\par \par GI - Had Colonoscopy wnl results reported. OTC PPI and H2 blocker with minimal relief of symptoms. \par -Dexilant not covered. Continue Omeprazole 20 mg BID. \par -Continue bland diet. \par -Ordered fecal elastase, patient not completed since last visit, encouraged to do so \par \par PT - using [place], O2 sat is 93-95% on bike at [place]. \par \par HCM:\par Hep B 3-dose series complete \par Administered Prevar 13 8/2020\par Needs Pneumvax 23 in August 2021\par Still needs Tetanus, and shingles vaccine. [facility] \par \par -F/u via telehealth in 6-8w

## 2020-12-08 NOTE — END OF VISIT
[] : Fellow [Time Spent: ___ minutes] : I have spent [unfilled] minutes of time on the encounter. [>50% of the face to face encounter time was spent on counseling and/or coordination of care for ___] : Greater than 50% of the face to face encounter time was spent on counseling and/or coordination of care for [unfilled] [FreeTextEntry3] : [facility]. Awaiting approval for inhaled imipenem. \par f/u in 3 months.\par \par

## 2020-12-08 NOTE — HISTORY OF PRESENT ILLNESS
[___ Month(s) Ago] : [unfilled] month(s) ago [Stable] : stable [MSSA] : MSSA [Other: ___] : [unfilled] [___] : the last positive MSSA result was [unfilled] [Last AFB Date: ___] : the AFB was performed  [unfilled] [Last home IV Abx: ___] : The most recent course of home IV antibiotics was [unfilled] [Occasional] : occasional [None] : ~He/She~ has no hemoptysis [Unchanged] : showed no change from previous film [FVC ___] : the FVC was [unfilled] liters [Date: ___] : was performed [unfilled] [FEV1: ___] : the FEV1 was [unfilled] liters [] : gilberto suárez [Good] : good [AFB] : the patient had a MAC negative AFB [de-identified] : followed by Transplant at [place] Dr. Cunningham [de-identified] : This is a 43 y/o female CF pt who was dx'ed CF at 23 y/o, + 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 Sweat test on 10/12/98 was 65.3. Pt never had stool Pancrease testing and she does not have s/s of PI. + chronic sinusitis and 2 sinus surgeries, last was 2012.\par Was on Kalydeco. Started Symdeko 4/2018 tolerated, and transitioned to Trikafta 11/8/19. \par \par Pt seen today for follow up. Reports she is feeling well overall. Her respiratory status is at baseline, minimal cough, productive of yellowish phlegm when doing ACT. No significant SOB, has not had to use rescue inhaler and is tolerating exercise on stationary bike well. Has minimal nasal congestion, is complaint with nasal sprays and nasal irrigation. GERD symptoms have improved with gas-ex and antacids, having 1 normal BM daily to every other day. Continues to tolerate Trikafta well. Reports she is using Nystatin mouthwash after every use of inhaler. Has had no symptoms of thrush for >1 month. Denies fevers, chills, nausea, vomiting, chest pain or tightness, no shoulder/back pain or numbness/tingling, change in cough, wheeze, sob.\par \par Off prednisone since November, 2019. \par \par Parasthesias have resolved with dilution of Colistin using 5mL of NaCl to nebulize. Bilateral foot paresthesias in the past for a few days intermittent during the day, relieved with movement/walking, worse at rest, which patient attributes to the Colistin. Had similar episode when patient was on IV Colistin in 2019, which resolved with dose reduction. Patient now off Colistin. \par \par ACT: Al/HS BID/Pulm BID\par Using HS 7% and is tolerating well. \par [place] vest and using it 30 minutes BID\par Using Aerobika QD/BID and ACT\par Previously cycling Ceftazidine with colistin.\par \par Pt with hx of Oral thrush for prolonged time, however now improved over past several months when transitioned off of INH Ceftazidime. Continues on Nystatin rinse daily, even though no symptoms of thrush for >1 mo, no longer on Fluconazole. Back on full dose Trikafta. \par \par ENT: Compliant with sinus rinses. +PND using Dymista daily and Afirin daily.  Denies dysphagia.\par \par Transplant: f/u with [facility]. Underwent stress test on 7/22/20 \par

## 2020-12-08 NOTE — REVIEW OF SYSTEMS
[see HPI] : see HPI [Cough] : cough [Negative] : Heme/Lymph [Fever] : no fever [Chills] : no chills [Feeling Poorly] : not feeling poorly [Chest Pain] : no chest pain [Shortness Of Breath] : no shortness of breath [SOB on Exertion] : no shortness of breath during exertion [Abdominal Pain] : no abdominal pain [Vomiting] : no vomiting [Diarrhea] : no diarrhea [Heartburn] : no heartburn

## 2020-12-09 ENCOUNTER — NON-APPOINTMENT (OUTPATIENT)
Age: 45
End: 2020-12-09

## 2020-12-15 ENCOUNTER — NON-APPOINTMENT (OUTPATIENT)
Age: 45
End: 2020-12-15

## 2020-12-16 ENCOUNTER — APPOINTMENT (OUTPATIENT)
Dept: OTOLARYNGOLOGY | Facility: CLINIC | Age: 45
End: 2020-12-16

## 2020-12-16 ENCOUNTER — RX RENEWAL (OUTPATIENT)
Age: 45
End: 2020-12-16

## 2020-12-23 ENCOUNTER — APPOINTMENT (OUTPATIENT)
Dept: OTOLARYNGOLOGY | Facility: CLINIC | Age: 45
End: 2020-12-23
Payer: COMMERCIAL

## 2020-12-23 VITALS
HEART RATE: 67 BPM | BODY MASS INDEX: 30.73 KG/M2 | HEIGHT: 64 IN | SYSTOLIC BLOOD PRESSURE: 127 MMHG | DIASTOLIC BLOOD PRESSURE: 84 MMHG | WEIGHT: 180 LBS

## 2020-12-23 DIAGNOSIS — J47.1 BRONCHIECTASIS WITH (ACUTE) EXACERBATION: ICD-10-CM

## 2020-12-23 DIAGNOSIS — I45.6 PRE-EXCITATION SYNDROME: ICD-10-CM

## 2020-12-23 DIAGNOSIS — R06.00 DYSPNEA, UNSPECIFIED: ICD-10-CM

## 2020-12-23 DIAGNOSIS — Z86.19 PERSONAL HISTORY OF OTHER INFECTIOUS AND PARASITIC DISEASES: ICD-10-CM

## 2020-12-23 DIAGNOSIS — E84.9 CYSTIC FIBROSIS, UNSPECIFIED: ICD-10-CM

## 2020-12-23 DIAGNOSIS — Z87.898 PERSONAL HISTORY OF OTHER SPECIFIED CONDITIONS: ICD-10-CM

## 2020-12-23 DIAGNOSIS — R05 COUGH: ICD-10-CM

## 2020-12-23 DIAGNOSIS — T50.905A ADVERSE EFFECT OF UNSPECIFIED DRUGS, MEDICAMENTS AND BIOLOGICAL SUBSTANCES, INITIAL ENCOUNTER: ICD-10-CM

## 2020-12-23 DIAGNOSIS — R04.2 HEMOPTYSIS: ICD-10-CM

## 2020-12-23 PROCEDURE — 99072 ADDL SUPL MATRL&STAF TM PHE: CPT

## 2020-12-23 PROCEDURE — 31575 DIAGNOSTIC LARYNGOSCOPY: CPT

## 2020-12-23 PROCEDURE — 99215 OFFICE O/P EST HI 40 MIN: CPT | Mod: 25

## 2020-12-23 RX ORDER — COLISTIMETHATE SODIUM 150 MG/1
150 INJECTION, POWDER, FOR SOLUTION INTRAMUSCULAR; INTRAVENOUS
Qty: 60 | Refills: 3 | Status: DISCONTINUED | COMMUNITY
Start: 2017-07-31 | End: 2020-12-23

## 2020-12-23 RX ORDER — FLUTICASONE PROPIONATE AND SALMETEROL 50; 500 UG/1; UG/1
500-50 POWDER RESPIRATORY (INHALATION)
Qty: 3 | Refills: 1 | Status: DISCONTINUED | COMMUNITY
Start: 2019-10-07 | End: 2020-12-23

## 2020-12-23 RX ORDER — LORATADINE 5 MG/5 ML
0.05 SOLUTION, ORAL ORAL
Qty: 1 | Refills: 0 | Status: DISCONTINUED | COMMUNITY
Start: 2019-07-10 | End: 2020-12-23

## 2020-12-23 RX ORDER — CRANBERRY FRUIT EXTRACT 200 MG
CAPSULE ORAL
Qty: 90 | Refills: 0 | Status: DISCONTINUED | COMMUNITY
Start: 2020-02-06 | End: 2020-12-23

## 2020-12-23 RX ORDER — FLUCONAZOLE 200 MG/1
200 TABLET ORAL DAILY
Qty: 14 | Refills: 0 | Status: DISCONTINUED | COMMUNITY
Start: 2020-03-09 | End: 2020-12-23

## 2020-12-23 RX ORDER — COLISTIMETHATE SODIUM 150 MG/1
150 INJECTION, POWDER, FOR SOLUTION INTRAMUSCULAR; INTRAVENOUS
Qty: 60 | Refills: 2 | Status: DISCONTINUED | COMMUNITY
Start: 2019-04-03 | End: 2020-12-23

## 2020-12-23 RX ORDER — CLOTRIMAZOLE 10 MG/1
10 LOZENGE ORAL 4 TIMES DAILY
Qty: 84 | Refills: 0 | Status: DISCONTINUED | COMMUNITY
Start: 2020-02-18 | End: 2020-12-23

## 2020-12-23 RX ORDER — PANTOPRAZOLE 40 MG/1
40 TABLET, DELAYED RELEASE ORAL TWICE DAILY
Refills: 0 | Status: DISCONTINUED | COMMUNITY
Start: 2019-03-07 | End: 2020-12-23

## 2020-12-23 RX ORDER — DEXLANSOPRAZOLE 30 MG/1
30 CAPSULE, DELAYED RELEASE ORAL DAILY
Qty: 30 | Refills: 0 | Status: DISCONTINUED | COMMUNITY
Start: 2020-10-02 | End: 2020-12-23

## 2020-12-23 RX ORDER — FLUTICASONE FUROATE, UMECLIDINIUM BROMIDE AND VILANTEROL TRIFENATATE 100; 62.5; 25 UG/1; UG/1; UG/1
100-62.5-25 POWDER RESPIRATORY (INHALATION) DAILY
Qty: 1 | Refills: 11 | Status: DISCONTINUED | COMMUNITY
Start: 2020-01-17 | End: 2020-12-23

## 2020-12-23 RX ORDER — MONTELUKAST 10 MG/1
10 TABLET, FILM COATED ORAL
Qty: 90 | Refills: 2 | Status: DISCONTINUED | COMMUNITY
Start: 2019-03-07 | End: 2020-12-23

## 2020-12-23 RX ORDER — CEFTAZIDIME 1 G/20ML
1 INJECTION, POWDER, FOR SOLUTION INTRAMUSCULAR; INTRAVENOUS
Qty: 56 | Refills: 3 | Status: DISCONTINUED | COMMUNITY
Start: 2019-04-17 | End: 2020-12-23

## 2020-12-23 RX ORDER — CLOTRIMAZOLE 10 MG/1
10 LOZENGE ORAL
Qty: 120 | Refills: 0 | Status: DISCONTINUED | COMMUNITY
Start: 2020-04-21 | End: 2020-12-23

## 2020-12-23 NOTE — HISTORY OF PRESENT ILLNESS
[SMR] : submucous resection (SMR) [Sinus] : sinus surgery [de-identified] : 45 year old female, history of cystic fibrosis, follow up for recurrent thrush, recommended follow up by Dr. Madisyn Hollis, pulmonologist.  States often gets thrush when on an inhaled antibiotic.  States has been off inhaled antibiotics for the past 3 months, but still has been getting thrush on her tongue.  States sometimes has sinus pressure and sinus headaches.  States doing sinus rinses twice a day.  States Dymista daily with good result.  Pt. on Trikafta and doing well.

## 2020-12-23 NOTE — PHYSICAL EXAM
[Midline] : trachea located in midline position [Normal] : normal appearance, well groomed, well nourished, and in no acute distress

## 2020-12-23 NOTE — PROCEDURE
[Video Captured] : video captured and filed [Topical Lidocaine] : topical lidocaine [Oxymetazoline HCl] : oxymetazoline HCl [Flexible Endoscope] : examined with the flexible endoscope [Serial Number: ___] : Serial Number: [unfilled] [Image(s) Captured] : image(s) captured and filed [Unable to Cooperate with Mirror] : patient unable to cooperate with mirror [Complicated Symptoms] : complicated symptoms requiring more thorough examination than provided by mirror [de-identified] : Patient was placed in the examination chair in a sitting position. The nose was decongested with oxymetazoline nasal solution. The airway was anesthetized with 4% Xylocaine.  The back of the throat was anesthetized with Cetacaine. Direct flexible/rigid video endoscopy was performed. Findings revealed:\par Patient septum is essentially midline. Sinuses are open and clear no polyps. Nasopharynx clear base of tongue clear no sign of thrush larynx vocal cords mobile epiglottis normal [de-identified] : R/O thrush, CF

## 2020-12-23 NOTE — REASON FOR VISIT
[Subsequent Evaluation] : a subsequent evaluation for [FreeTextEntry2] : follow up for recurrent thrush, recommended follow up by Dr. Madisyn Hollis, pulmonologist

## 2020-12-23 NOTE — CONSULT LETTER
[Dear  ___] : Dear  [unfilled], [Consult Letter:] : I had the pleasure of evaluating your patient, [unfilled]. [Please see my note below.] : Please see my note below. [Consult Closing:] : Thank you very much for allowing me to participate in the care of this patient.  If you have any questions, please do not hesitate to contact me. [Sincerely,] : Sincerely, [FreeTextEntry3] : Ayush Lino MD, AGUILA, FACS\par  Department Otolaryngology\par Director of [facility]\par Professor of Otolaryngology, \par [employer]\par

## 2020-12-30 ENCOUNTER — RX RENEWAL (OUTPATIENT)
Age: 45
End: 2020-12-30

## 2021-01-13 ENCOUNTER — NON-APPOINTMENT (OUTPATIENT)
Age: 46
End: 2021-01-13

## 2021-01-14 ENCOUNTER — RESULT REVIEW (OUTPATIENT)
Age: 46
End: 2021-01-14

## 2021-01-26 ENCOUNTER — NON-APPOINTMENT (OUTPATIENT)
Age: 46
End: 2021-01-26

## 2021-01-27 ENCOUNTER — LABORATORY RESULT (OUTPATIENT)
Age: 46
End: 2021-01-27

## 2021-01-28 ENCOUNTER — NON-APPOINTMENT (OUTPATIENT)
Age: 46
End: 2021-01-28

## 2021-02-01 ENCOUNTER — NON-APPOINTMENT (OUTPATIENT)
Age: 46
End: 2021-02-01

## 2021-02-02 ENCOUNTER — APPOINTMENT (OUTPATIENT)
Dept: GASTROENTEROLOGY | Facility: CLINIC | Age: 46
End: 2021-02-02
Payer: COMMERCIAL

## 2021-02-02 PROCEDURE — 99203 OFFICE O/P NEW LOW 30 MIN: CPT | Mod: 95

## 2021-02-03 NOTE — ASSESSMENT
[FreeTextEntry1] : 42 year old female with CF who has a dull intermittent abdominal pain and burping and a history of GERD. Diff Dx includes PUD, gastritis, pancreatic insuff. \par \par Plan: EGD with EUS to evaluate the pancreas. Will check with lab on stool studies. Note an elastase was sent last year but the results were not returned.Consider sending a stool elastase.
Detail Level: Zone

## 2021-02-03 NOTE — HISTORY OF PRESENT ILLNESS
[de-identified] : 42 year old female with a history of CF. Pt states that for the last few months she has been having a dull epigastric abdominal pain off and on and burping off and on. The epigastric pain can last for hours and started in Sept. She states that Gasex helps at times but other times does not help. In addition the pt states that she has loose stools  about once a week. Stool studies were sent but are not available. She notes that when she has the abdominal pain her abdomen is hard. \par \par The pt has a long history of GERD and was taking Omeprazole 20mg once a day which was increased to BID when she developed the abdominal pain in Sept.  and pepcid but the Pepcid was stopped. The pt has never used pancreatic supplements. However her sister who also has CF was on pancreatic supplements at one time.

## 2021-02-04 ENCOUNTER — NON-APPOINTMENT (OUTPATIENT)
Age: 46
End: 2021-02-04

## 2021-02-04 DIAGNOSIS — R19.5 OTHER FECAL ABNORMALITIES: ICD-10-CM

## 2021-02-25 LAB — GI PCR PANEL, STOOL: NORMAL

## 2021-02-26 ENCOUNTER — OUTPATIENT (OUTPATIENT)
Dept: OUTPATIENT SERVICES | Facility: HOSPITAL | Age: 46
LOS: 1 days | End: 2021-02-26
Payer: COMMERCIAL

## 2021-02-26 VITALS
SYSTOLIC BLOOD PRESSURE: 114 MMHG | RESPIRATION RATE: 16 BRPM | OXYGEN SATURATION: 97 % | HEART RATE: 62 BPM | HEIGHT: 64 IN | TEMPERATURE: 98 F | WEIGHT: 192.02 LBS | DIASTOLIC BLOOD PRESSURE: 80 MMHG

## 2021-02-26 DIAGNOSIS — T78.40XA ALLERGY, UNSPECIFIED, INITIAL ENCOUNTER: ICD-10-CM

## 2021-02-26 DIAGNOSIS — E84.9 CYSTIC FIBROSIS, UNSPECIFIED: ICD-10-CM

## 2021-02-26 DIAGNOSIS — R10.13 EPIGASTRIC PAIN: ICD-10-CM

## 2021-02-26 LAB
ANION GAP SERPL CALC-SCNC: 9 MMOL/L — SIGNIFICANT CHANGE UP (ref 7–14)
BUN SERPL-MCNC: 15 MG/DL — SIGNIFICANT CHANGE UP (ref 7–23)
CALCIUM SERPL-MCNC: 9.4 MG/DL — SIGNIFICANT CHANGE UP (ref 8.4–10.5)
CHLORIDE SERPL-SCNC: 103 MMOL/L — SIGNIFICANT CHANGE UP (ref 98–107)
CO2 SERPL-SCNC: 27 MMOL/L — SIGNIFICANT CHANGE UP (ref 22–31)
CREAT SERPL-MCNC: 0.61 MG/DL — SIGNIFICANT CHANGE UP (ref 0.5–1.3)
GLUCOSE SERPL-MCNC: 87 MG/DL — SIGNIFICANT CHANGE UP (ref 70–99)
HCG UR QL: NEGATIVE — SIGNIFICANT CHANGE UP
HCT VFR BLD CALC: 41.3 % — SIGNIFICANT CHANGE UP (ref 34.5–45)
HGB BLD-MCNC: 12.6 G/DL — SIGNIFICANT CHANGE UP (ref 11.5–15.5)
MCHC RBC-ENTMCNC: 30.4 PG — SIGNIFICANT CHANGE UP (ref 27–34)
MCHC RBC-ENTMCNC: 30.5 GM/DL — LOW (ref 32–36)
MCV RBC AUTO: 99.5 FL — SIGNIFICANT CHANGE UP (ref 80–100)
NRBC # BLD: 0 /100 WBCS — SIGNIFICANT CHANGE UP
NRBC # FLD: 0 K/UL — SIGNIFICANT CHANGE UP
PLATELET # BLD AUTO: 284 K/UL — SIGNIFICANT CHANGE UP (ref 150–400)
POTASSIUM SERPL-MCNC: 3.7 MMOL/L — SIGNIFICANT CHANGE UP (ref 3.5–5.3)
POTASSIUM SERPL-SCNC: 3.7 MMOL/L — SIGNIFICANT CHANGE UP (ref 3.5–5.3)
RBC # BLD: 4.15 M/UL — SIGNIFICANT CHANGE UP (ref 3.8–5.2)
RBC # FLD: 12.3 % — SIGNIFICANT CHANGE UP (ref 10.3–14.5)
SODIUM SERPL-SCNC: 139 MMOL/L — SIGNIFICANT CHANGE UP (ref 135–145)
WBC # BLD: 5.79 K/UL — SIGNIFICANT CHANGE UP (ref 3.8–10.5)
WBC # FLD AUTO: 5.79 K/UL — SIGNIFICANT CHANGE UP (ref 3.8–10.5)

## 2021-02-26 PROCEDURE — 93010 ELECTROCARDIOGRAM REPORT: CPT

## 2021-02-26 RX ORDER — SODIUM CHLORIDE 9 MG/ML
1000 INJECTION, SOLUTION INTRAVENOUS
Refills: 0 | Status: DISCONTINUED | OUTPATIENT
Start: 2021-03-10 | End: 2021-03-24

## 2021-02-26 NOTE — H&P PST ADULT - NSICDXPROBLEM_GEN_ALL_CORE_FT
PROBLEM DIAGNOSES  Problem: Epigastric pain  Assessment and Plan: Pt is scheduled for upper endoscopic ultrasound anesthesia on 3/10/21.  Verbal and written pre op instructions reviewed with patient and pt able to verbalize understanding.   Verbal COVID testing scheduled preop per pt.       Problem: Allergy  Assessment and Plan: OR booking notified of pt's penicillin allergy via fax.     Problem: Cystic fibrosis  Assessment and Plan: Pt to obtain eval from CF team 3/4/21, pt to obtain all CF medication instructions from CF team.       PROBLEM DIAGNOSES  Problem: Epigastric pain  Assessment and Plan: Pt is scheduled for upper endoscopic ultrasound anesthesia on 3/10/21.  Verbal and written pre op instructions reviewed with patient and pt able to verbalize understanding.   Verbal COVID testing scheduled preop per pt.   Pt instructed to take omeprazole AM of surgery with a sip of water, pt able to verbalize understanding.       Problem: Allergy  Assessment and Plan: OR booking notified of pt's penicillin allergy via fax.     Problem: Cystic fibrosis  Assessment and Plan: Pt to obtain eval from CF team 3/4/21, pt to obtain all CF medication instructions from CF team.

## 2021-02-26 NOTE — H&P PST ADULT - NSICDXPASTMEDICALHX_GEN_ALL_CORE_FT
PAST MEDICAL HISTORY:  CF (Cystic Fibrosis)     Deviated septum     Epigastric pain     History of chronic sinusitis     History of Octavia-Parkinson-White (WPW) syndrome s/p ablation 12/2019 [facility]

## 2021-02-26 NOTE — H&P PST ADULT - HISTORY OF PRESENT ILLNESS
44 yo female with preop dx. epigastric pain presents to pre surgical testing.  Pt with epigastric pain and bloating x few months.  Pt is scheduled for upper endoscopic ultrasound anesthesia.

## 2021-02-26 NOTE — H&P PST ADULT - RS GEN PE MLT RESP DETAILS PC
breath sounds equal/good air movement/clear to auscultation bilaterally minimal rhonchi bilateral lower lobes, other lung fields clear/breath sounds equal/good air movement

## 2021-02-26 NOTE — H&P PST ADULT - NSICDXFAMILYHX_GEN_ALL_CORE_FT
FAMILY HISTORY:  FH: arrhythmia, father    Father  Still living? Unknown  Family history of diabetes mellitus, Age at diagnosis: Age Unknown    Sibling  Still living? Unknown  Family history of cystic fibrosis, Age at diagnosis: Age Unknown

## 2021-03-04 ENCOUNTER — APPOINTMENT (OUTPATIENT)
Dept: PULMONOLOGY | Facility: CLINIC | Age: 46
End: 2021-03-04
Payer: COMMERCIAL

## 2021-03-04 VITALS
BODY MASS INDEX: 32.95 KG/M2 | RESPIRATION RATE: 17 BRPM | DIASTOLIC BLOOD PRESSURE: 84 MMHG | HEART RATE: 72 BPM | HEIGHT: 64 IN | OXYGEN SATURATION: 98 % | TEMPERATURE: 97 F | SYSTOLIC BLOOD PRESSURE: 124 MMHG | WEIGHT: 193 LBS

## 2021-03-04 DIAGNOSIS — Z01.811 ENCOUNTER FOR PREPROCEDURAL RESPIRATORY EXAMINATION: ICD-10-CM

## 2021-03-04 DIAGNOSIS — R10.13 EPIGASTRIC PAIN: ICD-10-CM

## 2021-03-04 PROCEDURE — 99072 ADDL SUPL MATRL&STAF TM PHE: CPT

## 2021-03-04 PROCEDURE — 99215 OFFICE O/P EST HI 40 MIN: CPT

## 2021-03-04 RX ORDER — NYSTATIN 100000 [USP'U]/ML
100000 SUSPENSION ORAL
Qty: 300 | Refills: 0 | Status: DISCONTINUED | COMMUNITY
Start: 2020-02-06 | End: 2021-03-04

## 2021-03-05 NOTE — PHYSICAL EXAM
[General Appearance - Well Developed] : well developed [General Appearance - In No Acute Distress] : no acute distress [Normal Conjunctiva] : the conjunctiva exhibited no abnormalities [Normal Oral Mucosa] : normal oral mucosa [Normal Oropharynx] : normal oropharynx [Neck Appearance] : the appearance of the neck was normal [Neck Cervical Mass (___cm)] : no neck mass was observed [Apical Impulse] : the apical impulse was normal [Heart Rate And Rhythm] : heart rate was normal and rhythm regular [Heart Sounds] : normal S1 and S2 [Murmurs] : no murmurs [Respiration, Rhythm And Depth] : normal respiratory rhythm and effort [Exaggerated Use Of Accessory Muscles For Inspiration] : no accessory muscle use [Auscultation Breath Sounds / Voice Sounds] : lungs were clear to auscultation bilaterally [Bowel Sounds] : normal bowel sounds [Abdomen Soft] : soft [Abdomen Tenderness] : non-tender [Abnormal Walk] : normal gait [Skin Color & Pigmentation] : normal skin color and pigmentation [No Focal Deficits] : no focal deficits [Oriented To Time, Place, And Person] : oriented to person, place, and time [Affect] : the affect was normal [Mood] : the mood was normal [Well Groomed] : well groomed [] : no rash [FreeTextEntry1] : + PND

## 2021-03-05 NOTE — END OF VISIT
[] : Fellow [Time Spent: ___ minutes] : I have spent [unfilled] minutes of time on the encounter. [FreeTextEntry3] : I agree with the advanced clinical provider's history, physical examination and plan of care. I personally elicited a history and examined the patient. See above attestation.\par 45 minutes time spent reviewing medical records, labs, inhaled antibiotic options, documentation.\par \par

## 2021-03-05 NOTE — ASSESSMENT
[FreeTextEntry1] : 46 y/o female CF pt who was dx'ed CF at 21 y/o, + 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 Sweat test on 10/12/98 was 65.3. Pt never had stool Pancrease testing and she does not have s/s of PI. + chronic sinusitis and 2 sinus surgeries, last was 2012.\par \par Pt seen today for follow up. Respiratory wise feels well at baseline. Here for clearance for EGD/EUS with Dr Gore on 3/10/21 at [facility] \par \par PULM:\par ACT: Al/HS 7%/Pulmozyme BID\par [place] vest and using it 30 minutes BID, sometimes Aerobika\par - Cont Stiolto \par \par FEV1: 32% on HOME crista (3/421), 33% (12/2020) (33% (6/2020), 31% (2/6/20), 33% (12/10/19), 31% from (6/25/19) 27%,  (3/7/19) 27%----->FeV1 31% (8/2018), FEV1 29% 3/27/18; 27% (6/25/19).\par FEV1 has been 32 - 33% in 2016; 39% in 2015. 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 coli. \par \par Off prednisone since November, 2019. Off inhaled Antibx since 9/2020\par \par Previously cycling Ceftazidine with Colistin. Inhaled ceftazidime was held due to frequent OP thrush which has improved. Held colistin inh for 3 months due to respiratory intolerance - repeat IgE 500s from 1000s (8/2020), and 1800 (2/2020) - pt also overall feels better being OFF colistin. May be allergic/bronchospastic response to inh colistin.  Achromobacter persisting on culture 11/3.Meropenem/Imipenem NOT covered.\par - Started Levaquin Inhaled \par - Sputum CS today \par - AFB today \par \par RESOLVED Bilateral foot paresthesias with dilution of Colistin using 5mL of NaCl to nebulize.\par Had similar episode when patient was on IV Colistin in 2019, which resolved with dose reduction.\par \par ENT: Chronic Sinusitis, Oral thrush for prolonged time, Resolved for past several months when transitioned off of INH Ceftazidime. No need for recent Nystatin. Previously treated with Fluconazole. ENT scoped and no thrush/sinuses clear\par - Cont Dymista \par \par ALLERGY - pt had skin testing and is NOT allergic to CIPRO/LEVAQUIN, CLINDAMYCIN. \par CANNOT USE TIW AZITHROMYCIN AS ANTIINFLAMMATORY GIVEN ALL HER ALLERGY HX,\par \par CFTR MODULATOR - Tolerating symdeko, started in 4/2018. Trikafta on 11/18/19 and tolerating w/o AE\par \par LUNG TRANSPLANT - currently following with [facility] last visit was tele next visit July '21. Underwent stress test on 7/22/20 and ECHO. \par - Transplant: f/u with [facility] Dr. Cunningham apt 7/2021\par \par Endocrine - OGTT - wnl 11/2020 repeat annually\par OSTEOPENIA - CALCIUM CITRATE. ON VITAMIN D. CONSIDER BISPHOSPHONATE. \par Vitamin D deficiency - patient on vitamin D 4000 IU. On Calcium and MVI\par - Repeat DEXA 4/2021\par \par Heme - hx anemia On Niferix\par \par GI - Had Colonoscopy wnl results reported. OTC PPI and H2 blocker with minimal relief of symptoms. Dexilant not covered. Fecal elastase normal. \par - Continue Omeprazole 20 mg BID\par \par HCM:\par Annual Labs done today \par Hep B 3-dose series complete \par Administered Prevar 13 8/2020\par Needs Pneumvax 23 in August 2021\par Still needs Tetanus, and shingles vaccine. On [facility] waiting list for shingrix \par Covid 19 Vacc 2/2021\par \par Preprocedure Respiratory Clearance - patient is at moderate risk for pulmonary complications from EGD/EUS with conscious sedation. Pt is optimized from a respiratory standpoint. I recommended patient to continue ACT and maintenance medications after procedure. \par \par -F/u 3 months with [facility]

## 2021-03-05 NOTE — HISTORY OF PRESENT ILLNESS
[___ Month(s) Ago] : [unfilled] month(s) ago [Stable] : stable [MSSA] : MSSA [Other: ___] : [unfilled] [___] : the last positive MSSA result was [unfilled] [Last AFB Date: ___] : the AFB was performed  [unfilled] [Last home IV Abx: ___] : The most recent course of home IV antibiotics was [unfilled] [Occasional] : occasional [None] : ~He/She~ has no hemoptysis [Unchanged] : showed no change from previous film [FVC ___] : the FVC was [unfilled] liters [Date: ___] : was performed [unfilled] [FEV1: ___] : the FEV1 was [unfilled] liters [] : gilberto suárez [Good] : good [AFB] : the patient had a MAC negative AFB [de-identified] : followed by Transplant at [place] Dr. Cunningham [de-identified] : This is a 44 y/o female CF pt who was dx'ed CF at 23 y/o, + 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 Sweat test on 10/12/98 was 65.3. Pt never had stool Pancrease testing and she does not have s/s of PI. + chronic sinusitis and 2 sinus surgeries, last was 2012.\par Was on Kalydeco. Started Symdeko 4/2018 tolerated, and transitioned to Trikafta 11/8/19. \par \par Pt seen today for follow up and clearance for EGD/EUS with Dr. Gore on 3/10/21. Reports she is feeling well overall. Her respiratory status is at baseline, minimal cough, productive of yellowish phlegm when doing ACT and in AM. No significant SOB. Has minimal nasal congestion, is complaint with nasal sprays and nasal irrigation. GERD symptoms still bothersome on Omeprazole BID and Gasx. Continues to tolerate Trikafta well. Has had no symptoms of thrush seen by ENT and scoped no thrush and sinuses clear. Denies fevers, chills, nausea, vomiting, chest pain or tightness, no shoulder/back pain or numbness/tingling, change in cough, wheeze, sob.\par \par Off prednisone since November, 2019. Off Inhaled Antibx since 9/2020\par \par Parasthesias resolved with dilution of Colistin using 5mL of NaCl to nebulize. Bilateral foot paresthesias in the past had similar episode when patient was on IV Colistin in 2019, which resolved with dose reduction. OFF Colistin since Fall 2020\par \par ACT: Al/HS BID/Pulm BID\par Using HS 7% and is tolerating well. \par [place] vest and using it 30 minutes BID\par Using Aerobika QD/BID and ACT\par Previously cycling Ceftazidine with colistin none for several months \par Imipenem/Meropenem inhaled NOT covered. \par ON Stiolto \par \par PAN-R Achromobacter\par \par ENT: Compliant with sinus rinses. +PND using Dymista daily and Afirin PRN  Denies dysphagia.Oral thrush resolved since stopping inhaled Ceftaz no need for Nystatin in months. ENT scoped pt 12/2020 sinuses clear and no signs of thrush \par \par GI/Nutr: GERD symptoms worsened over last few months. +Bloating, gas/burpring. On Omeprazole BID and GasX \par EGD/EUS pending 3/10/21. Fecal elastace repeated normal. 1 formed BM daily.\par \par ENDO: Osteopenia on Vit D and Ca DEXA 4/2019, OGTT 11/2020 was WNL \par \par Transplant: f/u with [facility] Dr. Cunningham. Underwent stress test on 7/22/20\par

## 2021-03-07 ENCOUNTER — APPOINTMENT (OUTPATIENT)
Dept: DISASTER EMERGENCY | Facility: CLINIC | Age: 46
End: 2021-03-07

## 2021-03-08 ENCOUNTER — RX RENEWAL (OUTPATIENT)
Age: 46
End: 2021-03-08

## 2021-03-08 LAB — SARS-COV-2 N GENE NPH QL NAA+PROBE: NOT DETECTED

## 2021-03-09 ENCOUNTER — NON-APPOINTMENT (OUTPATIENT)
Age: 46
End: 2021-03-09

## 2021-03-09 LAB
25(OH)D3 SERPL-MCNC: 57 NG/ML
ALBUMIN SERPL ELPH-MCNC: 4.8 G/DL
ALP BLD-CCNC: 97 U/L
ALT SERPL-CCNC: 24 U/L
ANION GAP SERPL CALC-SCNC: 17 MMOL/L
APTT BLD: 34.9 SEC
AST SERPL-CCNC: 23 U/L
BASOPHILS # BLD AUTO: 0.06 K/UL
BASOPHILS NFR BLD AUTO: 1 %
BILIRUB SERPL-MCNC: 0.5 MG/DL
BUN SERPL-MCNC: 15 MG/DL
CALCIUM SERPL-MCNC: 9.6 MG/DL
CHLORIDE SERPL-SCNC: 104 MMOL/L
CHOLEST SERPL-MCNC: 224 MG/DL
CO2 SERPL-SCNC: 20 MMOL/L
CREAT SERPL-MCNC: 0.67 MG/DL
CREAT SPEC-SCNC: 66 MG/DL
EOSINOPHIL # BLD AUTO: 0.27 K/UL
EOSINOPHIL NFR BLD AUTO: 4.4 %
ESTIMATED AVERAGE GLUCOSE: 105 MG/DL
GLUCOSE SERPL-MCNC: 89 MG/DL
HBA1C MFR BLD HPLC: 5.3 %
HCT VFR BLD CALC: 41.2 %
HDLC SERPL-MCNC: 68 MG/DL
HGB BLD-MCNC: 13.2 G/DL
IMM GRANULOCYTES NFR BLD AUTO: 0.2 %
INR PPP: 0.97 RATIO
LDLC SERPL CALC-MCNC: 137 MG/DL
LYMPHOCYTES # BLD AUTO: 1.39 K/UL
LYMPHOCYTES NFR BLD AUTO: 22.7 %
MAN DIFF?: NORMAL
MCHC RBC-ENTMCNC: 31.1 PG
MCHC RBC-ENTMCNC: 32 GM/DL
MCV RBC AUTO: 97.2 FL
MICROALBUMIN 24H UR DL<=1MG/L-MCNC: <1.2 MG/DL
MICROALBUMIN/CREAT 24H UR-RTO: NORMAL MG/G
MONOCYTES # BLD AUTO: 0.48 K/UL
MONOCYTES NFR BLD AUTO: 7.8 %
NEUTROPHILS # BLD AUTO: 3.92 K/UL
NEUTROPHILS NFR BLD AUTO: 63.9 %
NONHDLC SERPL-MCNC: 155 MG/DL
PLATELET # BLD AUTO: 295 K/UL
POTASSIUM SERPL-SCNC: 4.2 MMOL/L
PROT SERPL-MCNC: 7.5 G/DL
PT BLD: 11.6 SEC
RBC # BLD: 4.24 M/UL
RBC # FLD: 12.6 %
SODIUM SERPL-SCNC: 141 MMOL/L
TOTAL IGE SMQN RAST: 516 KU/L
TRIGL SERPL-MCNC: 91 MG/DL
WBC # FLD AUTO: 6.13 K/UL

## 2021-03-09 RX ORDER — MEROPENEM 500 MG/20ML
500 INJECTION INTRAVENOUS
Qty: 56 | Refills: 3 | Status: DISCONTINUED | COMMUNITY
Start: 2020-11-09 | End: 2021-03-09

## 2021-03-09 RX ORDER — IMIPENEM AND CILASTATIN SODIUM 500; 500 MG/20ML; MG/20ML
500 INJECTION, POWDER, FOR SOLUTION INTRAVENOUS
Qty: 60 | Refills: 3 | Status: DISCONTINUED | COMMUNITY
Start: 2020-11-11 | End: 2021-03-09

## 2021-03-10 ENCOUNTER — NON-APPOINTMENT (OUTPATIENT)
Age: 46
End: 2021-03-10

## 2021-03-10 ENCOUNTER — APPOINTMENT (OUTPATIENT)
Dept: GASTROENTEROLOGY | Facility: HOSPITAL | Age: 46
End: 2021-03-10

## 2021-03-10 ENCOUNTER — RESULT REVIEW (OUTPATIENT)
Age: 46
End: 2021-03-10

## 2021-03-10 ENCOUNTER — OUTPATIENT (OUTPATIENT)
Dept: OUTPATIENT SERVICES | Facility: HOSPITAL | Age: 46
LOS: 1 days | Discharge: HOME | End: 2021-03-10
Payer: COMMERCIAL

## 2021-03-10 VITALS
RESPIRATION RATE: 21 BRPM | OXYGEN SATURATION: 97 % | SYSTOLIC BLOOD PRESSURE: 105 MMHG | DIASTOLIC BLOOD PRESSURE: 56 MMHG | HEART RATE: 73 BPM

## 2021-03-10 VITALS
SYSTOLIC BLOOD PRESSURE: 112 MMHG | TEMPERATURE: 98 F | RESPIRATION RATE: 18 BRPM | HEART RATE: 73 BPM | DIASTOLIC BLOOD PRESSURE: 57 MMHG | HEIGHT: 64 IN | OXYGEN SATURATION: 100 % | WEIGHT: 190.04 LBS

## 2021-03-10 DIAGNOSIS — R10.13 EPIGASTRIC PAIN: ICD-10-CM

## 2021-03-10 LAB
A FLAVUS AB FLD QL: NEGATIVE
A FUMIGATUS AB FLD QL: NEGATIVE
A NIGER AB FLD QL: NEGATIVE
BACTERIA SPT CF RESP CULT: ABNORMAL
HCG UR QL: NEGATIVE — SIGNIFICANT CHANGE UP

## 2021-03-10 PROCEDURE — 43239 EGD BIOPSY SINGLE/MULTIPLE: CPT | Mod: 59,GC

## 2021-03-10 PROCEDURE — 88305 TISSUE EXAM BY PATHOLOGIST: CPT | Mod: 26

## 2021-03-10 PROCEDURE — 43259 EGD US EXAM DUODENUM/JEJUNUM: CPT | Mod: GC

## 2021-03-10 RX ORDER — CETIRIZINE HYDROCHLORIDE 10 MG/1
0 TABLET ORAL
Qty: 0 | Refills: 0 | DISCHARGE

## 2021-03-10 RX ORDER — MONTELUKAST 4 MG/1
1 TABLET, CHEWABLE ORAL
Qty: 0 | Refills: 0 | DISCHARGE

## 2021-03-10 RX ORDER — OMEPRAZOLE 10 MG/1
1 CAPSULE, DELAYED RELEASE ORAL
Qty: 0 | Refills: 0 | DISCHARGE

## 2021-03-10 RX ORDER — ALBUTEROL 90 UG/1
0 AEROSOL, METERED ORAL
Qty: 0 | Refills: 0 | DISCHARGE

## 2021-03-10 RX ORDER — DORNASE ALFA 1 MG/ML
2.5 SOLUTION RESPIRATORY (INHALATION)
Qty: 0 | Refills: 0 | DISCHARGE

## 2021-03-10 RX ORDER — AZELASTINE HYDROCHLORIDE AND FLUTICASONE PROPIONATE 137; 50 UG/1; UG/1
1 SPRAY, METERED NASAL
Qty: 0 | Refills: 0 | DISCHARGE

## 2021-03-10 RX ORDER — TIOTROPIUM BROMIDE AND OLODATEROL 3.124; 2.736 UG/1; UG/1
2 SPRAY, METERED RESPIRATORY (INHALATION)
Qty: 0 | Refills: 0 | DISCHARGE

## 2021-03-10 RX ORDER — IVACAFTOR 75 MG/1
1 GRANULE ORAL
Qty: 0 | Refills: 0 | DISCHARGE

## 2021-03-10 RX ADMIN — SODIUM CHLORIDE 30 MILLILITER(S): 9 INJECTION, SOLUTION INTRAVENOUS at 13:01

## 2021-03-10 NOTE — ASU PREOP CHECKLIST - BP NONINVASIVE SYSTOLIC (MM HG)
Patient contacted and informed of the below per provider documentation. Patient verbalizes understanding.     Nereida Ramirez RN  [facility]         [phone]

## 2021-03-10 NOTE — ASU PATIENT PROFILE, ADULT - PMH
CF (Cystic Fibrosis)    Deviated septum    Epigastric pain    History of chronic sinusitis    History of Octavia-Parkinson-White (WPW) syndrome  s/p ablation 12/2019 [facility]

## 2021-03-12 LAB
A-TOCOPHEROL VIT E SERPL-MCNC: 12.3 MG/L
BETA+GAMMA TOCOPHEROL SERPL-MCNC: 1.5 MG/L
VIT A SERPL-MCNC: 38.1 UG/DL

## 2021-03-16 LAB — MENADIONE SERPL-MCNC: 2.72 NG/ML

## 2021-03-17 ENCOUNTER — OUTPATIENT (OUTPATIENT)
Dept: OUTPATIENT SERVICES | Facility: HOSPITAL | Age: 46
LOS: 1 days | End: 2021-03-17
Payer: COMMERCIAL

## 2021-03-17 ENCOUNTER — APPOINTMENT (OUTPATIENT)
Dept: MAMMOGRAPHY | Facility: IMAGING CENTER | Age: 46
End: 2021-03-17
Payer: COMMERCIAL

## 2021-03-17 ENCOUNTER — APPOINTMENT (OUTPATIENT)
Dept: ULTRASOUND IMAGING | Facility: IMAGING CENTER | Age: 46
End: 2021-03-17
Payer: COMMERCIAL

## 2021-03-17 DIAGNOSIS — Z00.8 ENCOUNTER FOR OTHER GENERAL EXAMINATION: ICD-10-CM

## 2021-03-17 PROBLEM — R10.13 EPIGASTRIC PAIN: Chronic | Status: ACTIVE | Noted: 2021-02-26

## 2021-03-17 PROBLEM — Z86.79 PERSONAL HISTORY OF OTHER DISEASES OF THE CIRCULATORY SYSTEM: Chronic | Status: ACTIVE | Noted: 2021-02-26

## 2021-03-17 PROBLEM — Z87.09 PERSONAL HISTORY OF OTHER DISEASES OF THE RESPIRATORY SYSTEM: Chronic | Status: ACTIVE | Noted: 2021-02-26

## 2021-03-17 PROCEDURE — 77063 BREAST TOMOSYNTHESIS BI: CPT | Mod: 26

## 2021-03-17 PROCEDURE — 77067 SCR MAMMO BI INCL CAD: CPT

## 2021-03-17 PROCEDURE — 77067 SCR MAMMO BI INCL CAD: CPT | Mod: 26

## 2021-03-17 PROCEDURE — 77063 BREAST TOMOSYNTHESIS BI: CPT

## 2021-03-17 PROCEDURE — 76641 ULTRASOUND BREAST COMPLETE: CPT | Mod: 26,50

## 2021-03-17 PROCEDURE — 76641 ULTRASOUND BREAST COMPLETE: CPT

## 2021-03-18 ENCOUNTER — NON-APPOINTMENT (OUTPATIENT)
Age: 46
End: 2021-03-18

## 2021-03-18 LAB — SURGICAL PATHOLOGY STUDY: SIGNIFICANT CHANGE UP

## 2021-03-19 ENCOUNTER — NON-APPOINTMENT (OUTPATIENT)
Age: 46
End: 2021-03-19

## 2021-03-22 ENCOUNTER — NON-APPOINTMENT (OUTPATIENT)
Age: 46
End: 2021-03-22

## 2021-03-26 ENCOUNTER — NON-APPOINTMENT (OUTPATIENT)
Age: 46
End: 2021-03-26

## 2021-03-30 ENCOUNTER — NON-APPOINTMENT (OUTPATIENT)
Age: 46
End: 2021-03-30

## 2021-04-26 LAB — RHODAMINE-AURAMINE STN SPEC: NORMAL

## 2021-05-07 ENCOUNTER — NON-APPOINTMENT (OUTPATIENT)
Age: 46
End: 2021-05-07

## 2021-05-21 ENCOUNTER — NON-APPOINTMENT (OUTPATIENT)
Age: 46
End: 2021-05-21

## 2021-06-09 ENCOUNTER — APPOINTMENT (OUTPATIENT)
Dept: GASTROENTEROLOGY | Facility: CLINIC | Age: 46
End: 2021-06-09
Payer: COMMERCIAL

## 2021-06-09 VITALS
HEART RATE: 65 BPM | RESPIRATION RATE: 17 BRPM | BODY MASS INDEX: 29.02 KG/M2 | OXYGEN SATURATION: 98 % | WEIGHT: 170 LBS | DIASTOLIC BLOOD PRESSURE: 75 MMHG | TEMPERATURE: 97.7 F | SYSTOLIC BLOOD PRESSURE: 118 MMHG | HEIGHT: 64 IN

## 2021-06-09 DIAGNOSIS — F45.8 OTHER SOMATOFORM DISORDERS: ICD-10-CM

## 2021-06-09 PROCEDURE — 99205 OFFICE O/P NEW HI 60 MIN: CPT

## 2021-06-09 PROCEDURE — 99215 OFFICE O/P EST HI 40 MIN: CPT

## 2021-06-09 PROCEDURE — 99072 ADDL SUPL MATRL&STAF TM PHE: CPT

## 2021-06-09 NOTE — HISTORY OF PRESENT ILLNESS
[FreeTextEntry1] : Office consultation on 6/9/2021.\par \par The patient is a 45-year-old woman evaluated for consultation with complaint of "constant burping".  : Dr. Madisyn Hollis.\par \par Experienced onset of complaint of repetitive belching starting 10/2020.  Recalls somewhat sudden onset although particular precipitating factors at that time such as new medications, diet change etc. not reported.  Ever since complains of frequent repetitive belching and burping.\par \par At symptom onset initially also experienced an intermittent dull epigastric pain.  Some relief with Gas-X reported at that time.  Was also experiencing intermittent loose stools approximately once per week.  Symptoms at that time prompted GI evaluation including EGD/EUS in view of the patient's history of CF and concern for pancreatic insufficiency.\par \par EGD/EUS on 3/10/2021 revealed a normal esophagus.  Few approximate 8 mm sessile polyps were noted in the gastric body.  Visualized duodenum was normal.  EUS revealed somewhat dilated pancreatic duct in the body of the pancreas measuring 4 mm.  Endosonographic features of mild to moderate chronic pancreatitis in the pancreas body was noted.  Pancreatic parenchyma atrophy detected.  Duodenal biopsy was normal without features of celiac disease.  Gastric biopsy revealed chronic inactive gastritis.  Helicobacter pylori not detected.  Gastric polyp proved to be fundic gland polyp.  Distal esophagus biopsy was normal.\par \par The mild intermittent epigastric pain as well as loose stools proved self-limited and resolved.\par \par Repetitive belching and burping persist which is the patient's current GI complaint.  This occurs throughout the day but especially in the evening when recumbent.  It can awaken her from sleep 2-3 times per night.  Curiously, by turning her head to the right it provokes belching.  In association with turning her head to the right she can also experience right shoulder pain.\par \par Appetite is stable.  Indicates an intentional 15 to 20 pound weight loss over the past 6 months.  Denies any complaints of dysphagia.  Swallows liquids without choking, coughing or nasal regurgitation.  Swallow solid food without any retrosternal bolus hang up.\par \par Has relatively infrequent complaints of heartburn with approximately 2 episodes per month.  Described as a sensation of burning in her throat.  Precipitating factors not reported.  Episodes of regurgitation are infrequent approximately every 2 months.  Denies any current complaints of nausea, vomiting, early satiety, abdominal pain or gaseous complaints of bloating, distention or excessive flatus.  Has daily formed bowel movements without any complaints of diarrhea, constipation, change in bowel habit or rectal bleeding.  Observes no oil in her stool.\par \par Complains of postnasal drip.  Denies oral ulcers.  No change in vocal quality.  Experiences cough and wheezing.  No significant dyspnea at current time.\par \par Cystic fibrosis diagnosed at age 22 ( + 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 Sweat test on 10/12/98 was 65.3).  History of recurrent lung infections.  A sister also has cystic fibrosis.  Per patient–indicates that CF team does not feel that either CF or medications are provoking belching.\par \par Patient currently on pancreatic enzyme supplements of Creon 24 with administration of 3 tabs 3 times daily with meals.  Pancreatic enzyme supplementation has not had any impact on complaint of belching.\par \par Patient has a history of GERD and is currently on a regimen of omeprazole 20 mg twice daily.  No impact on complaint of belching.\par \par Patient is on multiple inhalational therapies for cystic fibrosis.  Indicates 1 of these treatments requires wearing a heavy vest.\par \par Patient indicates having had a colonoscopy in 2019.\par \par Patient reports no other history of digestive illness except as noted.  Family history of colon cancer not reported.

## 2021-06-09 NOTE — CONSULT LETTER
[Dear  ___] : Dear  [unfilled], [Consult Letter:] : I had the pleasure of evaluating your patient, [unfilled]. [( Thank you for referring [unfilled] for consultation for _____ )] : Thank you for referring [unfilled] for consultation for [unfilled] [Please see my note below.] : Please see my note below. [Consult Closing:] : Thank you very much for allowing me to participate in the care of this patient.  If you have any questions, please do not hesitate to contact me. [FreeTextEntry2] : Dr. Madisyn Hollis [FreeTextEntry3] : Reynaldo Calvillo M.D.

## 2021-06-09 NOTE — ASSESSMENT
[FreeTextEntry1] : Impression:\par \par #1 repetitive belching–consistent with aerophagia.  Has isolated problem of repetitive belching and burping.  During exam episodes of belching associated with air gulping.  Functional etiology of symptoms attributed to aerophagia deemed most likely.  Doubt GERD related symptoms or any relationship to chronic pancreatitis changes noted as consequence of cystic fibrosis.\par \par #2 GERD–relatively mild symptoms of occasional heartburn few times per month.  Erosive esophagitis or Odom's esophagus not detected at EGD.  Alarm symptoms not reported.\par \par #3 colon cancer screening–reports colonoscopy 2019 (per patient no significant findings).\par \par #4 cystic fibrosis–features of mild/moderate chronic pancreatitis noted at EUS.  No report of steatorrhea or current problems of fat malabsorption and/or abdominal pain.\par \par #5 history WPW–status post ablation.\par \par #6 status post sinus surgery x 2.\par \par #7 overweight–BMI 29.18.

## 2021-06-09 NOTE — PHYSICAL EXAM
[General Appearance - Alert] : alert [General Appearance - In No Acute Distress] : in no acute distress [General Appearance - Well Nourished] : well nourished [General Appearance - Well Developed] : well developed [General Appearance - Well-Appearing] : healthy appearing [Sclera] : the sclera and conjunctiva were normal [Neck Appearance] : the appearance of the neck was normal [Neck Cervical Mass (___cm)] : no neck mass was observed [Respiration, Rhythm And Depth] : normal respiratory rhythm and effort [Exaggerated Use Of Accessory Muscles For Inspiration] : no accessory muscle use [Heart Rate And Rhythm] : heart rate was normal and rhythm regular [Heart Sounds] : normal S1 and S2 [Heart Sounds Gallop] : no gallops [Murmurs] : no murmurs [Heart Sounds Pericardial Friction Rub] : no pericardial rub [Edema] : there was no peripheral edema [Bowel Sounds] : normal bowel sounds [Abdomen Soft] : soft [Abdomen Tenderness] : non-tender [] : no hepato-splenomegaly [Abdomen Mass (___ Cm)] : no abdominal mass palpated [Abdomen Hernia] : no hernia was discovered [Cervical Lymph Nodes Enlarged Posterior Bilaterally] : posterior cervical [Cervical Lymph Nodes Enlarged Anterior Bilaterally] : anterior cervical [Supraclavicular Lymph Nodes Enlarged Bilaterally] : supraclavicular [No CVA Tenderness] : no ~M costovertebral angle tenderness [Abnormal Walk] : normal gait [Skin Color & Pigmentation] : normal skin color and pigmentation [Oriented To Time, Place, And Person] : oriented to person, place, and time [Impaired Insight] : insight and judgment were intact [Affect] : the affect was normal [Mood] : the mood was normal [FreeTextEntry1] : Crackles heard bilaterally.

## 2021-06-15 ENCOUNTER — NON-APPOINTMENT (OUTPATIENT)
Age: 46
End: 2021-06-15

## 2021-06-15 ENCOUNTER — APPOINTMENT (OUTPATIENT)
Dept: PULMONOLOGY | Facility: CLINIC | Age: 46
End: 2021-06-15
Payer: COMMERCIAL

## 2021-06-15 VITALS
DIASTOLIC BLOOD PRESSURE: 84 MMHG | SYSTOLIC BLOOD PRESSURE: 145 MMHG | HEIGHT: 64 IN | OXYGEN SATURATION: 97 % | TEMPERATURE: 97.8 F | RESPIRATION RATE: 16 BRPM | HEART RATE: 83 BPM | WEIGHT: 172 LBS | BODY MASS INDEX: 29.37 KG/M2

## 2021-06-15 PROCEDURE — 99072 ADDL SUPL MATRL&STAF TM PHE: CPT

## 2021-06-15 PROCEDURE — 99215 OFFICE O/P EST HI 40 MIN: CPT

## 2021-06-16 NOTE — REVIEW OF SYSTEMS
[see HPI] : see HPI [Cough] : cough [Negative] : Heme/Lymph [Fever] : no fever [Chills] : no chills [Feeling Poorly] : not feeling poorly [Chest Pain] : no chest pain [Shortness Of Breath] : no shortness of breath [SOB on Exertion] : no shortness of breath during exertion [Abdominal Pain] : no abdominal pain [Vomiting] : no vomiting [Diarrhea] : no diarrhea [Heartburn] : no heartburn [Melena] : no melena

## 2021-06-16 NOTE — ASSESSMENT
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 Sweat test on 10/12/98 was 65.3. Pt never had stool Pancrease testing and she does not have s/s of PI. + chronic sinusitis and 2 sinus surgeries, last was 2012. Home spirometry stable with FEV1 34% pred. Pt seen today for follow up, clinically at baseline.\par \par PULM:\par ACT: Al/HS 7%/Pulmozyme BID\par [place] vest and using it 30 minutes BID, sometimes Aerobika\par - Cont Stiolto \par \par FEV1: 34% on HOME crista (6/2021), 33% (12/2020) (33% (6/2020), 31% (2/6/20), 33% (12/10/19), 31% from (6/25/19) 27%,  (3/7/19) 27%----->FeV1 31% (8/2018), FEV1 29% 3/27/18; 27% (6/25/19).\par FEV1 has been 32 - 33% in 2016; 39% in 2015. 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 coli. \par \par Off prednisone since November, 2019. Off inhaled Antibx since 9/2020\par \par Previously cycling Ceftazidine with Colistin. Inhaled ceftazidime was held due to frequent OP thrush which has improved. Held colistin inh for 3 months due to respiratory intolerance - repeat IgE 500s from 1000s (8/2020), and 1800 (2/2020) - pt also overall feels better being OFF colistin. May be allergic/bronchospastic response to inh colistin.  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 Pt solicited phage therapy at [place].\par \par RESOLVED Bilateral foot paresthesias with dilution of Colistin using 5mL of NaCl to nebulize.\par Had similar episode when patient was on IV Colistin in 2019, which resolved with dose reduction.\par \par ENT: Chronic Sinusitis, Oral thrush for prolonged time, Resolved for past several months when transitioned off of INH Ceftazidime. No need for recent Nystatin. Previously treated with Fluconazole. ENT scoped and no thrush/sinuses clear\par - Cont Dymista \par \par ALLERGY - pt had skin testing and is NOT allergic to CIPRO/LEVAQUIN, CLINDAMYCIN. \par CANNOT USE TIW AZITHROMYCIN AS ANTIINFLAMMATORY GIVEN ALL HER ALLERGY HX,\par \par CFTR MODULATOR - Tolerated symdeko, started in 4/2018. Switched to Trikafta since 11/18/19.\par \par LUNG TRANSPLANT - currently following with [facility] last visit was tele next visit July '21. Underwent stress test on 7/22/20 and ECHO. \par - Transplant: f/u with [facility] Dr. Cunningham apt 7/2021\par \par Endocrine - OGTT - wnl 11/2020 repeat annually\par OSTEOPENIA - CALCIUM CITRATE. ON VITAMIN D. CONSIDER BISPHOSPHONATE. \par Vitamin D deficiency - patient on vitamin D 4000 IU. On Calcium and MVI\par - Repeat DEXA 4/2021\par \par Heme - hx anemia On Niferix\par \par GI - Had Colonoscopy wnl results reported. OTC PPI and H2 blocker with minimal relief of symptoms. Dexilant not covered. Fecal elastase normal. \par - Continue Omeprazole 20 mg BID\par \par HCM:\par Hep B 3-dose series complete \par Administered Prevar 13 8/2020\par Needs Pneumvax 23 in August 2021\par Still needs Tetanus, and shingles vaccine. On [facility] waiting list for shingrix \par Covid 19 Vacc 2/2021\par \par -F/u 3 months with [facility]

## 2021-06-16 NOTE — END OF VISIT
[] : Fellow [FreeTextEntry3] : Patient contacted [facility] and will be coordinating with them for possible clinical trial there for phage therapy vs Achromobacter. Levaquin not covered by insurance and too expensive. Currently not on any inhaled antibiotics. \par 45 minutes time spent reviewing medical records, labs, inhaled antibiotic options, documentation.\par \par  [Time Spent: ___ minutes] : I have spent [unfilled] minutes of time on the encounter.

## 2021-06-16 NOTE — PHYSICAL EXAM
[General Appearance - Well Developed] : well developed [Well Groomed] : well groomed [General Appearance - In No Acute Distress] : no acute distress [Normal Conjunctiva] : the conjunctiva exhibited no abnormalities [Normal Oral Mucosa] : normal oral mucosa [Normal Oropharynx] : normal oropharynx [Neck Appearance] : the appearance of the neck was normal [Neck Cervical Mass (___cm)] : no neck mass was observed [Apical Impulse] : the apical impulse was normal [Heart Rate And Rhythm] : heart rate was normal and rhythm regular [Heart Sounds] : normal S1 and S2 [Murmurs] : no murmurs [Respiration, Rhythm And Depth] : normal respiratory rhythm and effort [Exaggerated Use Of Accessory Muscles For Inspiration] : no accessory muscle use [Auscultation Breath Sounds / Voice Sounds] : lungs were clear to auscultation bilaterally [Bowel Sounds] : normal bowel sounds [Abdomen Tenderness] : non-tender [Abdomen Soft] : soft [Abnormal Walk] : normal gait [] : no rash [Skin Color & Pigmentation] : normal skin color and pigmentation [No Focal Deficits] : no focal deficits [Oriented To Time, Place, And Person] : oriented to person, place, and time [Affect] : the affect was normal [Mood] : the mood was normal [FreeTextEntry1] : + PND

## 2021-06-16 NOTE — HISTORY OF PRESENT ILLNESS
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 Sweat test on 10/12/98 was 65.3. Pt never had stool Pancrease testing and she does not have s/s of PI. + chronic sinusitis and 2 sinus surgeries, last was 2012.Was on Kalydeco. Started Symdeko 4/2018 tolerated, and transitioned to Trikafta 11/8/19. \par \par Pt currently doing well. Has mild cough in AM but no sputum or dyspnea on exertion. No fevers. Solicited phage therapy at [facility] for her MDR achromobacter, and reportedly they have a phage for her. Her only complaint is burping which is worse after meals and lying flat. EGD with sessile polyps (path negative), H.pylori neg, findings suggestive of mild pancreatitis.\par \par Off prednisone since November, 2019. Off Inhaled Antibx since 9/2020, insurance doesn't cover. \par \par Parasthesias resolved with dilution of Colistin using 5mL of NaCl to nebulize. Bilateral foot paresthesias in the past had similar episode when patient was on IV Colistin in 2019, which resolved with dose reduction. OFF Colistin since Fall 2020\par \par ACT: Al/HS BID/Pulm BID\par Using HS 7% and is tolerating well. \par [place] vest and using it 30 minutes BID\par Using Aerobika QD/BID and ACT\par Previously cycling Ceftazidine with colistin none for several months \par Imipenem/Meropenem inhaled NOT covered. \par ON Stiolto \par \par PAN-R Achromobacter\par \par ENT: Compliant with sinus rinses. +PND using Dymista daily and Afirin PRN  Denies dysphagia.Oral thrush resolved since stopping inhaled Ceftaz no need for Nystatin in months. ENT scoped pt 12/2020 sinuses clear and no signs of thrush \par \par GI/Nutr: GERD symptoms worsened over last year. +Bloating, gas/burpring. EGD/EUS unremarkable. Fecal elastace repeated normal. 1 formed BM daily.\par \par ENDO: Osteopenia on Vit D and Ca DEXA 4/2019, OGTT 11/2020 was WNL \par \par Transplant: f/u with [facility]. Underwent stress test on 7/22/20\par

## 2021-07-30 NOTE — PHYSICAL THERAPY INITIAL EVALUATION ADULT - GAIT DISTANCE, PT EVAL
Caller: Pretty Tovar    Relationship: Self    Best call back number: [phone]     What is the best time to reach you: ANY     Who are you requesting to speak with (clinical staff, provider,  specific staff member): CLINICAL STAFF     Do you know the name of the person who called: N/A     What was the call regarding: ABNORMAL EKG AND WANTED TO DISCUSS IT WITH DEVIKA .     Do you require a callback: YES           
Message called to pt. She will contact her Cardiologist.  
Please let the patient know that I looked over her EKG, I want her cardiologist to take a look at it please. It looks like she was communicating via [employer] back and forth with her cardiology office on 7/15/21 and they were going to provide clearance but had wanted an EKG first.
200 feet

## 2021-08-17 ENCOUNTER — NON-APPOINTMENT (OUTPATIENT)
Age: 46
End: 2021-08-17

## 2021-08-26 ENCOUNTER — NON-APPOINTMENT (OUTPATIENT)
Age: 46
End: 2021-08-26

## 2021-08-30 ENCOUNTER — NON-APPOINTMENT (OUTPATIENT)
Age: 46
End: 2021-08-30

## 2021-09-23 ENCOUNTER — NON-APPOINTMENT (OUTPATIENT)
Age: 46
End: 2021-09-23

## 2021-10-05 ENCOUNTER — NON-APPOINTMENT (OUTPATIENT)
Age: 46
End: 2021-10-05

## 2021-10-07 ENCOUNTER — OUTPATIENT (OUTPATIENT)
Dept: OUTPATIENT SERVICES | Facility: HOSPITAL | Age: 46
LOS: 1 days | End: 2021-10-07
Payer: COMMERCIAL

## 2021-10-07 ENCOUNTER — NON-APPOINTMENT (OUTPATIENT)
Age: 46
End: 2021-10-07

## 2021-10-07 ENCOUNTER — APPOINTMENT (OUTPATIENT)
Dept: PULMONOLOGY | Facility: CLINIC | Age: 46
End: 2021-10-07
Payer: COMMERCIAL

## 2021-10-07 ENCOUNTER — APPOINTMENT (OUTPATIENT)
Dept: CT IMAGING | Facility: CLINIC | Age: 46
End: 2021-10-07
Payer: COMMERCIAL

## 2021-10-07 VITALS
WEIGHT: 160 LBS | DIASTOLIC BLOOD PRESSURE: 78 MMHG | BODY MASS INDEX: 27.31 KG/M2 | OXYGEN SATURATION: 96 % | HEIGHT: 64 IN | SYSTOLIC BLOOD PRESSURE: 129 MMHG | HEART RATE: 72 BPM | RESPIRATION RATE: 17 BRPM | TEMPERATURE: 97.4 F

## 2021-10-07 DIAGNOSIS — E84.9 CYSTIC FIBROSIS, UNSPECIFIED: ICD-10-CM

## 2021-10-07 PROCEDURE — 99215 OFFICE O/P EST HI 40 MIN: CPT

## 2021-10-07 PROCEDURE — 71250 CT THORAX DX C-: CPT | Mod: 26

## 2021-10-07 PROCEDURE — 71250 CT THORAX DX C-: CPT

## 2021-10-08 NOTE — HISTORY OF PRESENT ILLNESS
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 Sweat test on 10/12/98 was 65.3. Pt never had stool Pancrease testing and she does not have s/s of PI. + chronic sinusitis and 2 sinus surgeries, last was 2012.Was on Kalydeco. Started Symdeko 4/2018 tolerated, and transitioned to Trikafta 11/8/19. \par \par Pt here today for well visit. Endosring increased seasonal allergies for a few weeks +PND, clear nasal d/c, mild sinus congestion. Performing sinus rinses, dymista, zyrtec and singulair which are helping. Cough is baseline. Still has some burping saw GI on Enzymes, Omeprazole and Gasx with some relief. No fevers, chills, CARDENAS, wheezing, chest tightness, sneezing, hemoptysis. Solicited phage therapy at [facility] for her MDR achromobacter problems with processing and still pending results.\par \par Off prednisone since November, 2019. Off Inhaled Antibx since 9/2020, insurance doesn't cover. (was cycling ceftaz/coli) \par \par Parasthesias resolved with dilution of Colistin using 5mL of NaCl to nebulize. Bilateral foot paresthesias in the past had similar episode when patient was on IV Colistin in 2019, which resolved with dose reduction. OFF Colistin since Fall 2020\par \par ACT: Al/HS BID/Pulm BID\par Using HS 7% and is tolerating well. \par [place] vest and using it 30 minutes BID\par Using Aerobika QD/BID and ACT\par Imipenem/Meropenem inhaled NOT covered. \par ON Stiolto \par \par PAN-R Achromobacter\par \par ENT: Compliant with sinus rinses. +PND using Dymista daily and Afirin PRN  Denies dysphagia.Oral thrush resolved since stopping inhaled Ceftaz.. ENT scoped pt 12/2020 sinuses clear and no signs of thrush \par \par GI/Nutr: GERD symptoms worsened over last year. +Bloating, gas/burpring. EGD/EUS unremarkable. Fecal elastase repeated normal 1/2021 on enzymes for IBS. 1 formed BM daily. 33 lb weight loss in last year intentional diet/exercise. \par \par ENDO: Osteopenia on Vit D and Ca DEXA 4/2019, OGTT 11/2020 was WNL. Self stopped Calcium \par \par Transplant: f/u with [facility] Cunningham last visit 1/2021. Underwent stress test on 7/22/20

## 2021-10-08 NOTE — PHYSICAL EXAM
[General Appearance - Well Developed] : well developed [Well Groomed] : well groomed [General Appearance - In No Acute Distress] : no acute distress [Normal Conjunctiva] : the conjunctiva exhibited no abnormalities [Normal Oral Mucosa] : normal oral mucosa [Normal Oropharynx] : normal oropharynx [Neck Appearance] : the appearance of the neck was normal [Neck Cervical Mass (___cm)] : no neck mass was observed [Apical Impulse] : the apical impulse was normal [Heart Rate And Rhythm] : heart rate was normal and rhythm regular [Murmurs] : no murmurs [Heart Sounds] : normal S1 and S2 [Respiration, Rhythm And Depth] : normal respiratory rhythm and effort [Exaggerated Use Of Accessory Muscles For Inspiration] : no accessory muscle use [Bowel Sounds] : normal bowel sounds [Abdomen Soft] : soft [Abdomen Tenderness] : non-tender [Abnormal Walk] : normal gait [] : no rash [Skin Color & Pigmentation] : normal skin color and pigmentation [No Focal Deficits] : no focal deficits [Oriented To Time, Place, And Person] : oriented to person, place, and time [Affect] : the affect was normal [Mood] : the mood was normal [FreeTextEntry1] : Right sided squeaks and few wheezes in RLL

## 2021-10-08 NOTE — END OF VISIT
[Time Spent: ___ minutes] : I have spent [unfilled] minutes of time on the encounter. [FreeTextEntry3] : I d/w Dr. Octavio Power from [place], they will continue to work with patient sample to try to create a phage for achromobacter. insurance changing 11/1/21 - will order neb meropenem. FEV1 stable. CT chest performed today which I reviewed, CF changes and bulla stable in size. D/W patient regarding air travel and she is aware of the risks. Denies feeling SOB in flight.  f/u in 3 months.\par 45 minutes time spent reviewing medical records, labs, inhaled antibiotic options, documentation.\par \par

## 2021-10-14 LAB — BACTERIA SPT CF RESP CULT: ABNORMAL

## 2021-10-29 ENCOUNTER — NON-APPOINTMENT (OUTPATIENT)
Age: 46
End: 2021-10-29

## 2021-11-08 ENCOUNTER — NON-APPOINTMENT (OUTPATIENT)
Age: 46
End: 2021-11-08

## 2021-11-23 ENCOUNTER — NON-APPOINTMENT (OUTPATIENT)
Age: 46
End: 2021-11-23

## 2021-11-29 LAB — RHODAMINE-AURAMINE STN SPEC: NORMAL

## 2021-12-01 ENCOUNTER — APPOINTMENT (OUTPATIENT)
Dept: OTOLARYNGOLOGY | Facility: CLINIC | Age: 46
End: 2021-12-01
Payer: COMMERCIAL

## 2021-12-01 VITALS
DIASTOLIC BLOOD PRESSURE: 84 MMHG | WEIGHT: 163 LBS | HEIGHT: 64 IN | SYSTOLIC BLOOD PRESSURE: 130 MMHG | BODY MASS INDEX: 27.83 KG/M2 | HEART RATE: 86 BPM

## 2021-12-01 DIAGNOSIS — J96.11 CHRONIC RESPIRATORY FAILURE WITH HYPOXIA: ICD-10-CM

## 2021-12-01 PROCEDURE — 99215 OFFICE O/P EST HI 40 MIN: CPT | Mod: 25

## 2021-12-01 PROCEDURE — 31231 NASAL ENDOSCOPY DX: CPT

## 2021-12-01 RX ORDER — AZELASTINE HYDROCHLORIDE AND FLUTICASONE PROPIONATE 137; 50 UG/1; UG/1
137-50 SPRAY, METERED NASAL
Qty: 3 | Refills: 1 | Status: COMPLETED | COMMUNITY
Start: 2021-01-29 | End: 2021-12-01

## 2021-12-01 RX ORDER — CETIRIZINE HYDROCHLORIDE 10 MG/1
10 TABLET, COATED ORAL DAILY
Qty: 1 | Refills: 0 | Status: COMPLETED | COMMUNITY
Start: 2019-03-07 | End: 2021-12-01

## 2021-12-01 RX ORDER — OMEPRAZOLE 40 MG/1
CAPSULE, DELAYED RELEASE ORAL
Refills: 0 | Status: ACTIVE | COMMUNITY

## 2021-12-01 RX ORDER — ALBUTEROL SULFATE 90 UG/1
108 (90 BASE) AEROSOL, METERED RESPIRATORY (INHALATION)
Qty: 1 | Refills: 3 | Status: COMPLETED | COMMUNITY
Start: 2019-03-28 | End: 2021-12-01

## 2021-12-01 RX ORDER — EPINEPHRINE 0.3 MG/.3ML
0.3 INJECTION INTRAMUSCULAR
Qty: 1 | Refills: 0 | Status: COMPLETED | COMMUNITY
Start: 2017-01-05 | End: 2021-12-01

## 2021-12-01 NOTE — HISTORY OF PRESENT ILLNESS
[Ear Fullness] : ear fullness [de-identified] : 46 year old female, cystic fibrosis, follow up here for right ear crackling, sinus pressure.  States has had crackling in the right ear for the past 2-3 weeks.  States doing NeilMed sinus rinse twice a day.  Denies sinus infections in the past year.  Currently doing well on the Trikafta.

## 2021-12-01 NOTE — CONSULT LETTER
[Dear  ___] : Dear  [unfilled], [Please see my note below.] : Please see my note below. [Sincerely,] : Sincerely, [Consult Letter:] : I had the pleasure of evaluating your patient, [unfilled]. [FreeTextEntry2] : Madisyn Hollis [FreeTextEntry3] : Ayush Lino MD, AGUILA, FACS\par  Department Otolaryngology\par Director of [facility]\par Professor of Otolaryngology, \par [employer]\par

## 2021-12-01 NOTE — REASON FOR VISIT
[Subsequent Evaluation] : a subsequent evaluation for [FreeTextEntry2] : follow up here for right ear crackling, sinus pressure

## 2021-12-01 NOTE — PHYSICAL EXAM
[Nasal Endoscopy Performed] : nasal endoscopy was performed, see procedure section for findings [Midline] : trachea located in midline position [Normal] : no rashes [FreeTextEntry1] : Left ear stuffy

## 2021-12-01 NOTE — PROCEDURE
[Image(s) Captured] : image(s) captured and filed [Video Captured] : video captured and filed [Topical Lidocaine] : topical lidocaine [Oxymetazoline HCl] : oxymetazoline HCl [Flexible Endoscope] : examined with the flexible endoscope [Serial Number: ___] : Serial Number: [unfilled] [Osteomeatal Pathology] : osteomeatal pathology [Recalcitrant Symptoms] : recalcitrant symptoms  [Anatomical Abnormality] : anatomical abnormality [Anterior rhinoscopy insufficient to account for symptoms] : anterior rhinoscopy insufficient to account for symptoms [Normal] : the paranasal sinuses had no abnormalities [Paranasal Sinuses Maxillary Sinus] : no maxillary polyps [Paranasal Sinuses Ethmoid Sinus] : no ethmoid polyps [Paranasal Sinuses Sphenoid Sinus] : no spenoid polyps [FreeTextEntry6] : Pre-op indication(s): Left ear clotting, CF\par Post-op indication(s): sinuses and eustachian tube opening clear\par Verbal consent obtained from patient.\par “Anterior rhinoscopy insufficient to account for symptoms” \par Details for procedure: \par Scope #: [id number]\par Type of scope:    flexible fiber optic telescope  X   Rigid glass telescope \par Anesthesia and/or vasoconstriction was achieved topically by using: \par 4% Lidocaine spray   0.05% Oxymetazoline     Other ______ \par The following anatomic sites were directly examined in a sequential fashion: \par The scope was introduced in the nasal passage between the middle and inferior turbinates to exam the inferior portion of the middle meatus and the fontanelle, as well as the maxillary ostia. Next, the scope was passed medially and posteriorly to the middle turbinates to examine the sphenoethmoid recess and the superior turbinate region. \par Upon visualization the finders are as follows: \par Nasal Septum:   Normal   \par Bleeding site cauterized:    Anterior   left   right   Posterior   left   right \par Method:   Silver Nitrate   YAG Laser    Electrocautery ______ \par Right Side: \par * Mucosa: Normal\par * Mucous: Normal\par * Polyp: Normal\par * Inferior Turbinate: Normal\par * Middle Turbinate: Normal\par * Superior Turbinate: Normal\par * Inferior Meatus: Normal\par * Middle Meatus: Normal\par * Super Meatus: Normal\par * Sphenoethmoidal Recess: Normal\par Left Side: \par * Mucosa: Normal\par * Mucous: Normal\par * Polyp: Normal\par * Inferior Turbinate: Normal\par * Middle Turbinate: Normal\par * Superior Turbinate: Normal\par * Inferior Meatus: Normal\par * Middle Meatus: Normal\par * Super Meatus: Normal\par * Sphenoethmoidal Recess: Normal\par The patient tolerated the procedure well without any complications.\par \par \par  [de-identified] : Left ear stuffiness

## 2021-12-07 ENCOUNTER — NON-APPOINTMENT (OUTPATIENT)
Age: 46
End: 2021-12-07

## 2021-12-09 ENCOUNTER — NON-APPOINTMENT (OUTPATIENT)
Age: 46
End: 2021-12-09

## 2022-01-27 ENCOUNTER — APPOINTMENT (OUTPATIENT)
Dept: PULMONOLOGY | Facility: CLINIC | Age: 47
End: 2022-01-27

## 2022-01-27 ENCOUNTER — APPOINTMENT (OUTPATIENT)
Dept: PULMONOLOGY | Facility: CLINIC | Age: 47
End: 2022-01-27
Payer: COMMERCIAL

## 2022-01-27 ENCOUNTER — APPOINTMENT (OUTPATIENT)
Dept: PULMONOLOGY | Facility: CLINIC | Age: 47
End: 2022-01-27
Payer: MEDICARE

## 2022-01-27 VITALS
OXYGEN SATURATION: 96 % | TEMPERATURE: 98.3 F | HEIGHT: 64 IN | RESPIRATION RATE: 16 BRPM | HEART RATE: 69 BPM | SYSTOLIC BLOOD PRESSURE: 131 MMHG | DIASTOLIC BLOOD PRESSURE: 88 MMHG | WEIGHT: 169 LBS | BODY MASS INDEX: 28.85 KG/M2

## 2022-01-27 PROCEDURE — 94799 UNLISTED PULMONARY SVC/PX: CPT

## 2022-01-27 PROCEDURE — 99215 OFFICE O/P EST HI 40 MIN: CPT | Mod: 25

## 2022-01-27 PROCEDURE — 99417 PROLNG OP E/M EACH 15 MIN: CPT

## 2022-01-27 NOTE — PHYSICAL EXAM
[General Appearance - Well Developed] : well developed [Well Groomed] : well groomed [General Appearance - In No Acute Distress] : no acute distress [Normal Conjunctiva] : the conjunctiva exhibited no abnormalities [Normal Oral Mucosa] : normal oral mucosa [Normal Oropharynx] : normal oropharynx [Neck Appearance] : the appearance of the neck was normal [Neck Cervical Mass (___cm)] : no neck mass was observed [Apical Impulse] : the apical impulse was normal [Heart Rate And Rhythm] : heart rate was normal and rhythm regular [Heart Sounds] : normal S1 and S2 [Murmurs] : no murmurs [Respiration, Rhythm And Depth] : normal respiratory rhythm and effort [Exaggerated Use Of Accessory Muscles For Inspiration] : no accessory muscle use [Bowel Sounds] : normal bowel sounds [Abdomen Soft] : soft [Abdomen Tenderness] : non-tender [Abnormal Walk] : normal gait [Skin Color & Pigmentation] : normal skin color and pigmentation [] : no rash [No Focal Deficits] : no focal deficits [Oriented To Time, Place, And Person] : oriented to person, place, and time [Affect] : the affect was normal [Mood] : the mood was normal [Auscultation Breath Sounds / Voice Sounds] : lungs were clear to auscultation bilaterally

## 2022-01-27 NOTE — ASSESSMENT
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 Sweat test on 10/12/98 was 65.3. + chronic sinusitis and 2 sinus surgeries, last was 2012. Home spirometry improved with FEV1 37% pred. Pt seen today for follow up, clinically at baseline. +seasonal allergies\par \par PULM:\par ACT: Al/HS 7%/Pulmozyme BID\par [place] vest and using it 30 minutes BID, sometimes Aerobika\par - Cont Stiolto \par - Sputum AFB/CS cups \par - Sputum Samples for Phage matching collected and sent to [place] today. \par - Pt will send home crista. \par \par FEV1: 37% (home crista 10/6/21), 34% on HOME crista (6/2021), 33% (12/2020) (33% (6/2020), 31% (2/6/20), 33% (12/10/19), 31% from (6/25/19) 27%,  (3/7/19) 27%----->FeV1 31% (8/2018), FEV1 29% 3/27/18; 27% (6/25/19).\par FEV1 has been 32 - 33% in 2016; 39% in 2015. 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 coli. \par \par Off prednisone since November, 2019. Off inhaled Antibx since 9/2020\par \par Previously cycling Ceftazidine with Colistin. Inhaled ceftazidime was held due to frequent OP thrush which has improved. Held colistin inh for 3 months due to respiratory intolerance - repeat IgE 500s from 1000s (8/2020), and 1800 (2/2020) - pt also overall feels better being OFF colistin. May be allergic/bronchospastic response to inh colistin.  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 Pt solicited phage therapy at [place].\par \par RESOLVED Bilateral foot paresthesias with dilution of Colistin using 5mL of NaCl to nebulize.\par Had similar episode when patient was on IV Colistin in 2019, which resolved with dose reduction.\par \par ENT: Chronic Sinusitis, Oral thrush for prolonged time resolved when transitioned off of INH Ceftazidime. No need for recent Nystatin. Previously treated with Fluconazole. 12/2021 -ENT scoped and no thrush/sinuses clear\par - Cont Dymista, sinus rinses \par \par ALLERGY - pt had skin testing and is NOT allergic to CIPRO/LEVAQUIN, CLINDAMYCIN. \par CANNOT USE TIW AZITHROMYCIN AS ANTIINFLAMMATORY GIVEN ALL HER ALLERGY HX,\par \par CFTR MODULATOR - Tolerated symdeko, started in 4/2018. Switched to Trikafta since 11/18/19.\par \par LUNG TRANSPLANT - currently following with [facility] last visit was tele Jan '21. Underwent stress test on 7/22/20 and ECHO.  Patient has been previuosly made aware of [facility] Transplant program. Will reach out if interested in pursuing.\par - Transplant: f/u with [facility] Dr. Cunningham \par \par Endocrine - OGTT - wnl 11/2020 repeat annually\par OSTEOPENIA - CALCIUM CITRATE. ON VITAMIN D. CONSIDER BISPHOSPHONATE. \par Vitamin D deficiency - patient on vitamin D 4000 IU. On Calcium and MVI\par - Repeat DEXA 4/2021- OVERDUE. Provided script again today. \par - Continue Ca+ and Vitamin D\par \par Heme - hx anemia On Niferix\par \par GI - Had Colonoscopy wnl results reported. OTC PPI and H2 blocker with minimal relief of symptoms. Dexilant not covered. Fecal elastase normal. On Pancreatic enzymes although pancreatic sufficient for potential IBS sxs. +weight loss with diet and exercise 4-5 days/day. \par - Continue Omeprazole 20 mg BID, Gasx PRN\par - Cont Enzymes as prescribed \par - Provided patient with contact info for [facility]. \par -RD to f/u with pt. \par \par HCM:\par -2022 annual labs sent today. \par Hep B 3-dose series complete \par Administered Prevar 13 8/2020\par Needs Pneumvax 23 in August 2021 (deferred today would like to wait) \par Still needs Tetanus, and shingles vaccine. [facility] \par Covid 19 Vacc 2/2021 and booster on 8/19/21 \par FLU utd 9/22/2021\par \par \par Would like to think about [facility] participation \par \par -F/u 3 months with [facility]

## 2022-01-27 NOTE — REVIEW OF SYSTEMS
[Cough] : cough [Negative] : Heme/Lymph [see HPI] : see HPI [Fever] : no fever [Chills] : no chills [Feeling Poorly] : not feeling poorly [Chest Pain] : no chest pain [Shortness Of Breath] : no shortness of breath [SOB on Exertion] : no shortness of breath during exertion [Abdominal Pain] : no abdominal pain [Vomiting] : no vomiting [Constipation] : no constipation [Diarrhea] : no diarrhea [Heartburn] : no heartburn [Melena] : no melena

## 2022-01-27 NOTE — HISTORY OF PRESENT ILLNESS
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 Cunningham [de-identified] : This is a 45 y/o female CF pt who was dx'ed CF at 23 y/o, + 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 Sweat test on 10/12/98 was 65.3. Pt never had stool Pancrease testing and she does not have s/s of PI. + chronic sinusitis and 2 sinus surgeries, last was 2012.Was on Kalydeco. Started Symdeko 4/2018 tolerated, and transitioned to Trikafta 11/8/19. \par \par Pt here today for well visit with no acute complaints. States she is at her respiratory baseline- no change in cough, sputum production or exercise tolerance. Saw ENT last month and was scoped, clear sinuses reported. Pt states she has infrequent sinus HA's. Denies sinus pain, pressure or congestion otherwise. Performing sinus rinses, dymista, zyrtec and singulair which are helping. Continues to endorse belching, though mildly improved on Omeprazole, Enzymes, and GasX. Denies fevers, chills, CARDENAS, wheezing, chest tightness, hemoptysis, palpitations, LE swelling. \par \par Solicited phage therapy at [place] for her MDR achromobacter problems with processing and still pending results. Patient tried following up multiple times and never heard back. \par She then independently reached to the [place] lab and is here today to provide sputum samples for potential phage matching that we will send out on her behalf. \par \par Off prednisone since November, 2019. Off Inhaled Antibx since 9/2020, insurance doesn't cover. (was cycling ceftaz/coli) \par \par Parasthesias resolved with dilution of Colistin using 5mL of NaCl to nebulize. Bilateral foot paresthesias in the past had similar episode when patient was on IV Colistin in 2019, which resolved with dose reduction. OFF Colistin since Fall 2020\par \par ACT: Al/HS BID/Pulm BID\par Using HS 7% and is tolerating well. \par [place] vest and using it 30 minutes BID\par Using Aerobika QD/BID and ACT\par Imipenem/Meropenem inhaled NOT covered. \par ON Stiolto \par Ct 10/7/21- Impression: Bilateral bronchiectasis, scarring and architectural distortion r/t hx of CF is relatively unchanged. No new lung opacities. \par \par PAN-R Achromobacter\par \par ENT: Compliant with sinus rinses. +PND using Dymista daily and Afirin PRN  Denies dysphagia.Oral thrush resolved since stopping inhaled Ceftaz.. ENT scoped pt 12/2020 sinuses clear and no signs of thrush \par \par GI/Nutr: GERD symptoms worsened over last year. +gas/burpring. EGD/EUS unremarkable. Fecal elastase repeated normal 1/2021 on enzymes for IBS. 1 formed BM daily. 33 lb weight loss in last year intentional diet/exercise. \par \par ENDO: Osteopenia on Vit D and Ca DEXA 4/2019, OGTT 11/2020 was WNL. Self stopped Calcium \par \par Transplant: f/u with [facility] Dr. Cunningham last visit 1/2021. Underwent stress test on 7/22/20

## 2022-01-27 NOTE — END OF VISIT
[FreeTextEntry3] : I, Dr. Madisyn Hollis, personally performed the evaluation and management (E/M) services for this established patient who presents today with (a) new problem(s)/exacerbation of (an) existing condition(s).  That E/M includes conducting the examination, assessing all new/exacerbated conditions, and establishing a new plan of care.  Today, Sharmila Seo ACP, was here to observe my evaluation and management services for this new problem/exacerbated condition to be followed going forward.\par \par pt with MDR achromobacter. D/w [place] lab, Dr. Breanna Mccann regarding phage therapy for achromobacter,  d/w pt details related to bacteriophage request from [facility]. It will require FDA application for IND, and require IRB approval for expanded access for a NON-FDA approved drug for a single patient which are likely approved as last resort and a life-saving measure when patient is acutely ill, hospitalized. It will also require Biosafety approval from [facility]. These measures are dependent on the [place] lab providing the documentaion I've mentioned in documented chart note 12/9/21. I also reached out to IRB and [facility] research pharmacist to discuss this feasibility.\par 70 minutes time spent for patient education related to comorbidities and medications, medical records/labs/radiology reviews, preventative care, documentation, coordination of care.\par 11:15 - 12:25 pm\par \par

## 2022-02-03 ENCOUNTER — NON-APPOINTMENT (OUTPATIENT)
Age: 47
End: 2022-02-03

## 2022-02-03 LAB
25(OH)D3 SERPL-MCNC: 56.8 NG/ML
A FLAVUS AB FLD QL: NEGATIVE
A FUMIGATUS AB FLD QL: NEGATIVE
A NIGER AB FLD QL: NEGATIVE
ALBUMIN SERPL ELPH-MCNC: 4.8 G/DL
ALP BLD-CCNC: 91 U/L
ALT SERPL-CCNC: 14 U/L
ANION GAP SERPL CALC-SCNC: 15 MMOL/L
APPEARANCE: CLEAR
APTT BLD: 34 SEC
AST SERPL-CCNC: 18 U/L
BASOPHILS # BLD AUTO: 0.06 K/UL
BASOPHILS NFR BLD AUTO: 1.1 %
BILIRUB SERPL-MCNC: 0.5 MG/DL
BILIRUBIN URINE: NEGATIVE
BLOOD URINE: NEGATIVE
BUN SERPL-MCNC: 13 MG/DL
CALCIUM SERPL-MCNC: 9.7 MG/DL
CHLORIDE SERPL-SCNC: 102 MMOL/L
CHOLEST SERPL-MCNC: 213 MG/DL
CO2 SERPL-SCNC: 24 MMOL/L
COLOR: NORMAL
CREAT SERPL-MCNC: 0.57 MG/DL
EOSINOPHIL # BLD AUTO: 0.19 K/UL
EOSINOPHIL NFR BLD AUTO: 3.6 %
ESTIMATED AVERAGE GLUCOSE: 108 MG/DL
GLUCOSE QUALITATIVE U: NEGATIVE
GLUCOSE SERPL-MCNC: 86 MG/DL
HBA1C MFR BLD HPLC: 5.4 %
HCT VFR BLD CALC: 41.6 %
HDLC SERPL-MCNC: 66 MG/DL
HGB BLD-MCNC: 13.2 G/DL
IMM GRANULOCYTES NFR BLD AUTO: 0.2 %
INR PPP: 1.06 RATIO
KETONES URINE: NEGATIVE
LDLC SERPL CALC-MCNC: 133 MG/DL
LEUKOCYTE ESTERASE URINE: NEGATIVE
LYMPHOCYTES # BLD AUTO: 1.27 K/UL
LYMPHOCYTES NFR BLD AUTO: 24 %
MAN DIFF?: NORMAL
MCHC RBC-ENTMCNC: 30.6 PG
MCHC RBC-ENTMCNC: 31.7 GM/DL
MCV RBC AUTO: 96.5 FL
MONOCYTES # BLD AUTO: 0.46 K/UL
MONOCYTES NFR BLD AUTO: 8.7 %
NEUTROPHILS # BLD AUTO: 3.31 K/UL
NEUTROPHILS NFR BLD AUTO: 62.4 %
NITRITE URINE: NEGATIVE
NONHDLC SERPL-MCNC: 147 MG/DL
PH URINE: 6.5
PLATELET # BLD AUTO: 304 K/UL
POTASSIUM SERPL-SCNC: 4.4 MMOL/L
PROT SERPL-MCNC: 7.5 G/DL
PROTEIN URINE: NEGATIVE
PT BLD: 12.6 SEC
RBC # BLD: 4.31 M/UL
RBC # FLD: 12.4 %
SODIUM SERPL-SCNC: 142 MMOL/L
SPECIFIC GRAVITY URINE: 1.01
TOTAL IGE SMQN RAST: 333 KU/L
TRIGL SERPL-MCNC: 73 MG/DL
UROBILINOGEN URINE: NORMAL
WBC # FLD AUTO: 5.3 K/UL

## 2022-02-04 LAB
A-TOCOPHEROL VIT E SERPL-MCNC: 12.3 MG/L
BETA+GAMMA TOCOPHEROL SERPL-MCNC: 1.2 MG/L

## 2022-02-07 LAB — BACTERIA SPT CF RESP CULT: ABNORMAL

## 2022-03-01 LAB — VIT A SERPL-MCNC: 38.1 UG/DL

## 2022-03-02 ENCOUNTER — TRANSCRIPTION ENCOUNTER (OUTPATIENT)
Age: 47
End: 2022-03-02

## 2022-03-28 ENCOUNTER — TRANSCRIPTION ENCOUNTER (OUTPATIENT)
Age: 47
End: 2022-03-28

## 2022-04-12 ENCOUNTER — NON-APPOINTMENT (OUTPATIENT)
Age: 47
End: 2022-04-12

## 2022-05-17 ENCOUNTER — TRANSCRIPTION ENCOUNTER (OUTPATIENT)
Age: 47
End: 2022-05-17

## 2022-06-20 LAB — RHODAMINE-AURAMINE STN SPEC: NORMAL

## 2022-06-24 ENCOUNTER — NON-APPOINTMENT (OUTPATIENT)
Age: 47
End: 2022-06-24

## 2022-07-29 ENCOUNTER — TRANSCRIPTION ENCOUNTER (OUTPATIENT)
Age: 47
End: 2022-07-29

## 2022-08-02 LAB — SARS-COV-2 N GENE NPH QL NAA+PROBE: NOT DETECTED

## 2022-08-05 ENCOUNTER — APPOINTMENT (OUTPATIENT)
Dept: PULMONOLOGY | Facility: CLINIC | Age: 47
End: 2022-08-05

## 2022-08-05 ENCOUNTER — MED ADMIN CHARGE (OUTPATIENT)
Age: 47
End: 2022-08-05

## 2022-08-05 VITALS
WEIGHT: 170 LBS | HEIGHT: 64 IN | TEMPERATURE: 98.6 F | BODY MASS INDEX: 29.02 KG/M2 | SYSTOLIC BLOOD PRESSURE: 110 MMHG | DIASTOLIC BLOOD PRESSURE: 68 MMHG | HEART RATE: 80 BPM

## 2022-08-05 DIAGNOSIS — Z23 ENCOUNTER FOR IMMUNIZATION: ICD-10-CM

## 2022-08-05 DIAGNOSIS — R14.0 ABDOMINAL DISTENSION (GASEOUS): ICD-10-CM

## 2022-08-05 PROCEDURE — 99215 OFFICE O/P EST HI 40 MIN: CPT | Mod: 25

## 2022-08-05 PROCEDURE — 94729 DIFFUSING CAPACITY: CPT

## 2022-08-05 PROCEDURE — 94726 PLETHYSMOGRAPHY LUNG VOLUMES: CPT

## 2022-08-05 PROCEDURE — 94010 BREATHING CAPACITY TEST: CPT

## 2022-08-05 PROCEDURE — ZZZZZ: CPT

## 2022-08-05 RX ORDER — SODIUM CHLORIDE 9 MG/ML
0.9 INJECTION, SOLUTION INTRAMUSCULAR; INTRAVENOUS; SUBCUTANEOUS
Qty: 600 | Refills: 3 | Status: DISCONTINUED | COMMUNITY
Start: 2020-02-27 | End: 2022-08-05

## 2022-08-05 NOTE — END OF VISIT
[Time Spent: ___ minutes] : I have spent [unfilled] minutes of time on the encounter. [FreeTextEntry3] : I, Dr. Madisyn Hollis, personally performed the evaluation and management (E/M) services for this established patient who presents today with (a) new problem(s)/exacerbation of (an) existing condition(s).  That E/M includes conducting the examination, assessing all new/exacerbated conditions, and establishing a new plan of care.  Today, Sharmila Seo ACP, was here to observe my evaluation and management services for this new problem/exacerbated condition to be followed going forward.\par \par pt with MDR achromobacter. D/w [place] lab, Dr. Breanna Mccann regarding phage therapy for achromobacter,  d/w pt details related to bacteriophage request from [facility]. It will require FDA application for IND, and require IRB approval for expanded access for a NON-FDA approved drug for a single patient which are likely approved as last resort and a life-saving measure when patient is acutely ill, hospitalized. It will also require Biosafety approval from [facility]. These measures are dependent on the [place] lab providing the documentaion I've mentioned in documented chart note 12/9/21. I also reached out to IRB and [facility] research pharmacist to discuss this feasibility.\par 70 minutes time spent for patient education related to comorbidities and medications, medical records/labs/radiology reviews, preventative care, documentation, coordination of care.\par 11:15 - 12:25 pm\par \par

## 2022-08-05 NOTE — PHYSICAL EXAM
[General Appearance - Well Developed] : well developed [Well Groomed] : well groomed [General Appearance - In No Acute Distress] : no acute distress [Normal Conjunctiva] : the conjunctiva exhibited no abnormalities [Normal Oral Mucosa] : normal oral mucosa [Normal Oropharynx] : normal oropharynx [Neck Appearance] : the appearance of the neck was normal [Neck Cervical Mass (___cm)] : no neck mass was observed [Apical Impulse] : the apical impulse was normal [Heart Rate And Rhythm] : heart rate was normal and rhythm regular [Heart Sounds] : normal S1 and S2 [Murmurs] : no murmurs [Respiration, Rhythm And Depth] : normal respiratory rhythm and effort [Exaggerated Use Of Accessory Muscles For Inspiration] : no accessory muscle use [Bowel Sounds] : normal bowel sounds [Abdomen Soft] : soft [Abdomen Tenderness] : non-tender [Abnormal Walk] : normal gait [Skin Color & Pigmentation] : normal skin color and pigmentation [] : no rash [No Focal Deficits] : no focal deficits [Oriented To Time, Place, And Person] : oriented to person, place, and time [Affect] : the affect was normal [Mood] : the mood was normal [FreeTextEntry1] : Crackles anterior chest cleared with coughing, no wheezes/squeaks

## 2022-08-05 NOTE — HISTORY OF PRESENT ILLNESS
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 Cunningham [de-identified] : This is a 47 y/o female CF pt who was dx'ed CF at 23 y/o, + 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 Sweat test on 10/12/98 was 65.3. Pt never had stool Pancrease testing and she does not have s/s of PI. + chronic sinusitis and 2 sinus surgeries, last was 2012.Was on Kalydeco. Started Symdeko 4/2018 tolerated, and transitioned to Trikafta 11/8/19. \par \par Pt here today for well visit with no acute complaints at her respiratory baseline. Continues to endorse belching, though mildly improved on Omeprazole, Enzymes, and GasX. Denies change in cough/sputum, sinus pain, pressure or congestion otherwise, fevers, chills, CARDENAS, wheezing, chest tightness, hemoptysis, palpitations, LE swelling, hemoptysis. Has not seen [facility] in about a year prefers to follow them and does not want to see [facility] transplant at this time. Not on any inhaled antibx at this time still doing Alb/HS/Pulm QD/BID with aerobika and vest with some production of sputum. \par \par Solicited phage therapy at [place] for her MDR achromobacter problems with processing and still pending results. Patient tried following up multiple times and never heard back. \par She then independently reached to the [place] lab and is here today to provide sputum samples for potential phage matching that we will send out on her behalf. \par \par Off prednisone since November, 2019. Off Inhaled Antibx since 9/2020, insurance doesn't cover. (was cycling ceftaz/coli) \par \par Parasthesias resolved with dilution of Colistin using 5mL of NaCl to nebulize. Bilateral foot paresthesias in the past had similar episode when patient was on IV Colistin in 2019, which resolved with dose reduction. OFF Colistin since Fall 2020\par \par ACT: Al/HS BID/Pulm BID\par Using HS 7% and is tolerating well. \par [place] vest and using it 30 minutes BID\par Using Aerobika QD/BID and ACT\par Imipenem/Meropenem inhaled NOT covered. \par ON Stiolto \par Ct 10/7/21- Impression: Bilateral bronchiectasis, scarring and architectural distortion r/t hx of CF is relatively unchanged. No new lung opacities. \par \par PAN-R Achromobacter\par \par ENT: Compliant with sinus rinses. +PND using Dymista daily and Afirin PRN  Denies dysphagia.Oral thrush resolved since stopping inhaled Ceftaz. ENT scoped pt 12/2020 sinuses clear and no signs of thrush. \par \par GI/Nutr: GERD symptoms worsened over last year. +gas/burpring. EGD/EUS unremarkable. Fecal elastase repeated normal 1/2021 on enzymes for IBS. 1 formed BM daily. 33 lb weight loss in last year intentional diet/exercise. \par \par ENDO: Osteopenia on Vit D and Ca DEXA 4/2019, OGTT 11/2020 was WNL. Self stopped Calcium \par \par Transplant: f/u with [facility] last visit 1/2021. Underwent stress test on 7/22/20.

## 2022-08-05 NOTE — ASSESSMENT
[FreeTextEntry1] : ATTENDING ATTESTATION\par \par 45 y/o female CF pt who was dx'ed CF at 21 y/o, + 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 Sweat test on 10/12/98 was 65.3. + chronic sinusitis and 2 sinus surgeries, last was 2012. Pt seen today for follow up, clinically at baseline. +seasonal allergies\par \par PULM:\par ACT: Al/HS 7%/Pulmozyme BID\par [place] vest and using it 30 minutes BID, sometimes Aerobika\par - Cont Stiolto \par - Sputum CS cup today\par - Sputum Samples for Phage matching collected and sent to [facility] 1/2022 have not heard back\par \par FEV1: 34% (8/5/22), 37% (home crista 10/6/21), 34% on HOME crista (6/2021), 33% (12/2020) (33% (6/2020), 31% (2/6/20), 33% (12/10/19), 31% from (6/25/19) 27%,  (3/7/19) 27%----->FeV1 31% (8/2018), FEV1 29% 3/27/18; 27% (6/25/19).\par FEV1 has been 32 - 33% in 2016; 39% in 2015. 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 coli. \par \par Off prednisone since November, 2019. Off inhaled Antibx since 9/2020\par \par Previously cycling Ceftazidine with Colistin. Inhaled ceftazidime was held due to frequent OP thrush which has improved. Held colistin inh for 3 months due to respiratory intolerance - repeat IgE 500s from 1000s (8/2020), and 1800 (2/2020) - pt also overall feels better being OFF colistin. May be allergic/bronchospastic response to inh colistin.  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 Pt solicited phage therapy at [place].\par - With new insurance could attempt to obtain but pt would like to defer at this time. \par \par RESOLVED Bilateral foot paresthesias with dilution of Colistin using 5mL of NaCl to nebulize.\par Had similar episode when patient was on IV Colistin in 2019, which resolved with dose reduction.\par \par ENT: Chronic Sinusitis, Oral thrush for prolonged time resolved when transitioned off of INH Ceftazidime. No need for recent Nystatin. Previously treated with Fluconazole. 12/2021 -ENT scoped and no thrush/sinuses clear\par - Cont Dymista, sinus rinses \par \par ALLERGY - pt had skin testing and is NOT allergic to CIPRO/LEVAQUIN, CLINDAMYCIN. \par CANNOT USE TIW AZITHROMYCIN AS ANTIINFLAMMATORY GIVEN ALL HER ALLERGY HX,\par \par CFTR MODULATOR - Tolerated symdeko, started in 4/2018. Switched to Trikafta since 11/18/19.\par \par LUNG TRANSPLANT - currently following with [place] last visit was tele Jan '21. Underwent stress test on 7/22/20 and ECHO.  Patient aware of [facility] Transplant program prefers to follow with [facility].\par - Transplant: f/u with [place] Dr. Cunningham \par - Recommended patient follow up with [facility] overdue for testing ABG/6MW/ECHO\par \par Endocrine - OGTT - wnl 11/2020 repeat annually\par OSTEOPENIA - CALCIUM CITRATE. ON VITAMIN D. CONSIDER BISPHOSPHONATE. \par Vitamin D deficiency - patient on vitamin D 4000 IU. On Calcium and MVI\par - Repeat DEXA 4/2021- OVERDUE. Provided script again today. \par - Continue Ca+ and Vitamin D\par - Given RX for OGTT \par \par Heme - hx anemia On Niferix\par \par GI - Had Colonoscopy wnl results reported. OTC PPI and H2 blocker with minimal relief of symptoms. Dexilant not covered. Fecal elastase normal. On Pancreatic enzymes although pancreatic sufficient for potential IBS sxs. +weight loss with diet and exercise 4-5 days/day. \par - Continue Omeprazole 20 mg BID, Gasx PRN\par - Trial Pertzye sample given\par -F/U with RD today \par \par HCM:\par MAMMO - due\par Hep B 3-dose series complete \par Administered Prevar 13 8/2020\par Pneumovax 23 today Right IM delt\par Still needs Tetanus, and shingles vaccine. [facility] \par Covid 19 Vacc 2/2021 and booster on 8/19/21 \par Needs fasting lipid panel repeated pt deferred today \par refills sent as requested \par \par F/u 3 months with [facility]

## 2022-08-05 NOTE — REVIEW OF SYSTEMS
[Cough] : cough [see HPI] : see HPI [Negative] : Heme/Lymph [Fever] : no fever [Chills] : no chills [Feeling Poorly] : not feeling poorly [Chest Pain] : no chest pain [Shortness Of Breath] : no shortness of breath [SOB on Exertion] : no shortness of breath during exertion [Abdominal Pain] : no abdominal pain [Vomiting] : no vomiting [Constipation] : no constipation [Diarrhea] : no diarrhea [Heartburn] : no heartburn [Melena] : no melena

## 2022-08-08 ENCOUNTER — NON-APPOINTMENT (OUTPATIENT)
Age: 47
End: 2022-08-08

## 2022-08-10 LAB — BACTERIA SPT CF RESP CULT: ABNORMAL

## 2022-08-11 ENCOUNTER — NON-APPOINTMENT (OUTPATIENT)
Age: 47
End: 2022-08-11

## 2022-08-11 RX ORDER — PANCRELIPASE 120000; 24000; 76000 [USP'U]/1; [USP'U]/1; [USP'U]/1
24000-76000 CAPSULE, DELAYED RELEASE PELLETS ORAL
Qty: 900 | Refills: 2 | Status: DISCONTINUED | COMMUNITY
Start: 2021-03-11 | End: 2022-08-11

## 2022-08-15 LAB — BACTERIA SPT CF RESP CULT: ABNORMAL

## 2022-08-16 ENCOUNTER — NON-APPOINTMENT (OUTPATIENT)
Age: 47
End: 2022-08-16

## 2022-08-25 ENCOUNTER — NON-APPOINTMENT (OUTPATIENT)
Age: 47
End: 2022-08-25

## 2022-09-28 LAB — SARS-COV-2 N GENE NPH QL NAA+PROBE: NOT DETECTED

## 2022-09-29 ENCOUNTER — APPOINTMENT (OUTPATIENT)
Dept: PULMONOLOGY | Facility: CLINIC | Age: 47
End: 2022-09-29

## 2022-09-29 VITALS
WEIGHT: 171 LBS | HEART RATE: 67 BPM | SYSTOLIC BLOOD PRESSURE: 139 MMHG | HEIGHT: 64 IN | DIASTOLIC BLOOD PRESSURE: 82 MMHG | BODY MASS INDEX: 29.19 KG/M2 | OXYGEN SATURATION: 98 % | TEMPERATURE: 97.5 F

## 2022-09-29 DIAGNOSIS — B96.5 OTHER SPECIFIED RESPIRATORY DISORDERS: ICD-10-CM

## 2022-09-29 DIAGNOSIS — J98.8 OTHER SPECIFIED RESPIRATORY DISORDERS: ICD-10-CM

## 2022-09-29 PROCEDURE — ZZZZZ: CPT

## 2022-09-29 PROCEDURE — 90686 IIV4 VACC NO PRSV 0.5 ML IM: CPT

## 2022-09-29 PROCEDURE — 99417 PROLNG OP E/M EACH 15 MIN: CPT | Mod: 25

## 2022-09-29 PROCEDURE — G0008: CPT

## 2022-09-29 PROCEDURE — 94010 BREATHING CAPACITY TEST: CPT

## 2022-09-29 PROCEDURE — 99215 OFFICE O/P EST HI 40 MIN: CPT | Mod: 25

## 2022-09-29 NOTE — ASSESSMENT
[FreeTextEntry1] : ATTENDING ATTESTATION\par \par 45 y/o female CF pt who was dx'ed CF at 23 y/o, + 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 Sweat test on 10/12/98 was 65.3. + chronic sinusitis and 2 sinus surgeries, last was 2012. Pt seen today for follow up, clinically at baseline. +seasonal allergies\par \par PULM:\par ACT: Al/HS 7%/Pulmozyme BID\par [place] vest and using it 30 minutes BID, sometimes Aerobika\par - Cont Stiolto \par - Sputum CS cup today\par - Sputum CS from August + for Pseudomonas that is pan sensitve \par - Will start 28 day course of inhaled aztreonam 3x a day \par \par FEV1: (0.86 L) 30% (9/29/2022) (0.98 L) 34% (8/5/22), 37% (home crista 10/6/21), 34% on HOME crista (6/2021), 33% (12/2020) (33% (6/2020), 31% (2/6/20), 33% (12/10/19), 31% from (6/25/19) 27%,  (3/7/19) 27%----->FeV1 31% (8/2018), FEV1 29% 3/27/18; 27% (6/25/19).\par FEV1 has been 32 - 33% in 2016; 39% in 2015. 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 coli. \par \par Off prednisone since November, 2019. Off inhaled Antibx since 9/2020\par \par Previously cycling Ceftazidine with Colistin. Inhaled ceftazidime was held due to frequent OP thrush which has improved. Held colistin inh for 3 months due to respiratory intolerance - repeat IgE 500s from 1000s (8/2020), and 1800 (2/2020) - pt also overall feels better being OFF colistin. May be allergic/bronchospastic response to inh colistin.  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 Pt solicited phage therapy at [place].\par \par \par RESOLVED Bilateral foot paresthesias with dilution of Colistin using 5mL of NaCl to nebulize.\par Had similar episode when patient was on IV Colistin in 2019, which resolved with dose reduction.\par \par ENT: Chronic Sinusitis, Oral thrush for prolonged time resolved when transitioned off of INH Ceftazidime. No need for recent Nystatin. Previously treated with Fluconazole. 12/2021 -ENT scoped and no thrush/sinuses clear\par - Cont Dymista that is on hand\par - due to insurance issues recommend using flonase and AstePro together \par \par ALLERGY - pt had skin testing and is NOT allergic to CIPRO/LEVAQUIN, CLINDAMYCIN. \par CANNOT USE TIW AZITHROMYCIN AS ANTIINFLAMMATORY GIVEN ALL HER ALLERGY HX,\par \par CFTR MODULATOR - Tolerated symdeko, started in 4/2018. Switched to Trikafta since 11/18/19.\par \par LUNG TRANSPLANT - currently following with [facility] last visit was tele Jan '21. Underwent stress test on 7/22/20 and ECHO.  Patient aware of [facility]plant program prefers to follow with [facility].\par - Transplant: f/u with [facility]. [facility] \par - Recommended patient follow up with [facility] overdue for testing ABG/6MW/ECHO\par \par Endocrine - OGTT - wnl 11/2020 repeat annually\par OSTEOPENIA - CALCIUM CITRATE. ON VITAMIN D. CONSIDER BISPHOSPHONATE. \par Vitamin D deficiency - patient on vitamin D 4000 IU. On Calcium and MVI\par - Repeat DEXA 4/2021- OVERDUE. Provided script again today. \par - Continue Ca+ and Vitamin D\par - Given RX for OGTT \par \par Heme - hx anemia On Niferix\par \par GI - Had Colonoscopy wnl results reported. OTC PPI and H2 blocker with minimal relief of symptoms. Dexilant not covered. Fecal elastase normal. On Pancreatic enzymes although pancreatic sufficient for potential IBS sxs. +weight loss with diet and exercise 4-5 days/day. \par - Continue Omeprazole 20 mg BID, Gasx PRN\par - Trial Pertzye sample given\par -F/U with RD today \par \par HCM:\par MAMMO - due\par Hep B 3-dose series complete \par Administered Prevar 13 8/2020\par Pneumovax 23 8/9/2022\par Still needs Tetanus, and shingles vaccine. On [facility] waiting list for shingrix \par Covid 19 Vacc 2/2021 and booster on 8/19/21 and 2nd booster on  9/15/2022\par Influneza Vaccine given today\par Needs fasting lipid panel repeated pt deferred today \par refills sent as requested \par \par F/u 3 months

## 2022-09-29 NOTE — END OF VISIT
[] : Fellow [FreeTextEntry3] : pt with CF, 30% FEV1 today, 120 mL lower, but clinically stable and feels well. Last 2 sputum cx from 8/2022 - are + PA, no achromobacter cultured. Start cayston for one month, may need to alternate on/off QOM. Phage compassionate use - contacted [facility] lab, who expressed poor funding for phage trial. Will monitor for re-culturing of Achromobacter. \par If today's sputum cx is negative for PA, advised pt to drop off another sample in 2 weeks to confirm negativity. \par f/u in about mid-Dec OV with crista. one month after cycle of cayston. \par Dymista no longer covered; will order generic separately. \par \par 60 minutes time spent for patient education related to comorbidities and medications, medical records/labs/radiology reviews, preventative care, documentation, coordination of care.\par 2:15 - 3:15 pm\par

## 2022-09-29 NOTE — HISTORY OF PRESENT ILLNESS
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 Cunningham [de-identified] : This is a 45 y/o female CF pt who was dx'ed CF at 23 y/o, + 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 Sweat test on 10/12/98 was 65.3. Pt never had stool Pancrease testing and she does not have s/s of PI. + chronic sinusitis and 2 sinus surgeries, last was 2012.Was on Kalydeco. Started Symdeko 4/2018 tolerated, and transitioned to Trikafta 11/8/19. \par \par Pt here today for well visit with no acute complaints at her respiratory baseline. Belching has improved though mildly improved on Omeprazole, Enzymes, and GasX. Denies change in cough/sputum, sinus pain, pressure or congestion otherwise, fevers, chills, CARDENAS, wheezing, chest tightness, hemoptysis, palpitations, LE swelling, hemoptysis. Has not seen [facility] in about a year. Not on any inhaled antibx at this time still doing Alb/HS/Pulm QD/BID with aerobika and vest with some production of sputum. \par \par Solicited phage therapy at [place] for her MDR achromobacter problems with processing and still pending results. Patient tried following up multiple times and never heard back. \par She then independently reached to the [place] lab. Sputum sample was sent in August, but no response from the lab. Lab continues to inform us of their small operation and are limited in their ability to treat.\par \par Off prednisone since November, 2019. Off Inhaled Antibx since 9/2020, insurance doesn't cover. (was cycling ceftaz/coli) \par \par Parasthesias resolved with dilution of Colistin using 5mL of NaCl to nebulize. Bilateral foot paresthesias in the past had similar episode when patient was on IV Colistin in 2019, which resolved with dose reduction. OFF Colistin since Fall 2020\par \par ACT: Al/HS BID/Pulm BID\par Using HS 7% and is tolerating well. \par [place] vest and using it 30 minutes BID\par Using Aerobika QD/BID and ACT\par Imipenem/Meropenem inhaled NOT covered. \par ON Stiolto \par Ct 10/7/21- Impression: Bilateral bronchiectasis, scarring and architectural distortion r/t hx of CF is relatively unchanged. No new lung opacities. \par \par PAN-R Achromobacter\par \par ENT: Compliant with sinus rinses. +PND using Dymista daily and Afirin PRN  Denies dysphagia.Oral thrush resolved since stopping inhaled Ceftaz. ENT scoped pt 12/2020 sinuses clear and no signs of thrush. \par \par GI/Nutr: GERD symptoms worsened over last year. +gas/burpring. EGD/EUS unremarkable. Fecal elastase repeated normal 1/2021 on enzymes for IBS. 1 formed BM daily. 33 lb weight loss in last year intentional diet/exercise. \par \par ENDO: Osteopenia on Vit D and Ca DEXA 4/2019, OGTT 11/2020 was WNL. Self stopped Calcium \par \par Transplant: f/u with [facility] last visit 1/2021. Underwent stress test on 7/22/20.

## 2022-09-30 ENCOUNTER — NON-APPOINTMENT (OUTPATIENT)
Age: 47
End: 2022-09-30

## 2022-10-06 ENCOUNTER — NON-APPOINTMENT (OUTPATIENT)
Age: 47
End: 2022-10-06

## 2022-10-06 LAB — BACTERIA SPT CF RESP CULT: ABNORMAL

## 2022-10-06 RX ORDER — AZELASTINE HYDROCHLORIDE AND FLUTICASONE PROPIONATE 137; 50 UG/1; UG/1
137-50 SPRAY, METERED NASAL
Qty: 1 | Refills: 2 | Status: ACTIVE | COMMUNITY
Start: 2022-10-06 | End: 1900-01-01

## 2022-10-24 ENCOUNTER — NON-APPOINTMENT (OUTPATIENT)
Age: 47
End: 2022-10-24

## 2022-10-25 ENCOUNTER — LABORATORY RESULT (OUTPATIENT)
Age: 47
End: 2022-10-25

## 2022-11-28 LAB — RHODAMINE-AURAMINE STN SPEC: NORMAL

## 2022-12-02 ENCOUNTER — TRANSCRIPTION ENCOUNTER (OUTPATIENT)
Age: 47
End: 2022-12-02

## 2022-12-12 ENCOUNTER — NON-APPOINTMENT (OUTPATIENT)
Age: 47
End: 2022-12-12

## 2022-12-13 ENCOUNTER — NON-APPOINTMENT (OUTPATIENT)
Age: 47
End: 2022-12-13

## 2022-12-13 LAB
CHOLEST SERPL-MCNC: 202 MG/DL
HDLC SERPL-MCNC: 64 MG/DL
LDLC SERPL CALC-MCNC: 127 MG/DL
NONHDLC SERPL-MCNC: 139 MG/DL
TRIGL SERPL-MCNC: 60 MG/DL

## 2022-12-15 ENCOUNTER — APPOINTMENT (OUTPATIENT)
Dept: PULMONOLOGY | Facility: CLINIC | Age: 47
End: 2022-12-15

## 2022-12-15 VITALS
WEIGHT: 177 LBS | SYSTOLIC BLOOD PRESSURE: 129 MMHG | BODY MASS INDEX: 30.22 KG/M2 | TEMPERATURE: 97.8 F | HEART RATE: 85 BPM | OXYGEN SATURATION: 96 % | DIASTOLIC BLOOD PRESSURE: 89 MMHG | HEIGHT: 64 IN

## 2022-12-15 DIAGNOSIS — J15.6 PNEUMONIA DUE TO OTHER AEROBIC GRAM-NEGATIVE BACTERIA: ICD-10-CM

## 2022-12-15 DIAGNOSIS — E84.9 CYSTIC FIBROSIS, UNSPECIFIED: ICD-10-CM

## 2022-12-15 PROCEDURE — 99215 OFFICE O/P EST HI 40 MIN: CPT | Mod: 25

## 2022-12-15 PROCEDURE — ZZZZZ: CPT

## 2022-12-15 PROCEDURE — 94010 BREATHING CAPACITY TEST: CPT

## 2022-12-15 NOTE — END OF VISIT
[FreeTextEntry3] : pt with CF, Last 2 sputum cx from 8/2022 - are + PA, F/u cultures with achromobacter cultured; thus cayston was not started. Phage compassionate use - contacted [facility] lab, who expressed poor funding for phage trial; unfortunately, lab did not contact patient. overall, pt is stable; on levaquin for acute on CRS; d/w her to limit heavy exercise to reduce risk for tendonitis, reduce prednisone to 10 mg daily wean to off if tolerable, and continue antihistamines, allergy tabs in case of concern for levaquin inducing a delayed hypersensitivity reaction given hx with avelox in past.  \par \par 45 minutes time spent for patient education related to comorbidities and medications, medical records/labs/radiology reviews, preventative care, documentation, coordination of care.\par

## 2022-12-15 NOTE — REVIEW OF SYSTEMS
[Cough] : cough [see HPI] : see HPI [Negative] : Heme/Lymph [Nasal Discharge] : nasal discharge [Fever] : no fever [Chills] : no chills [Feeling Poorly] : not feeling poorly [Chest Pain] : no chest pain [Shortness Of Breath] : no shortness of breath [SOB on Exertion] : no shortness of breath during exertion [Abdominal Pain] : no abdominal pain [Vomiting] : no vomiting [Constipation] : no constipation [Diarrhea] : no diarrhea [Heartburn] : no heartburn [Melena] : no melena

## 2022-12-15 NOTE — ASSESSMENT
[FreeTextEntry1] : ATTENDING ATTESTATION\par \par 48 y/o female CF pt who was dx'ed CF at 21 y/o, + 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 Sweat test on 10/12/98 was 65.3. + chronic sinusitis and 2 sinus surgeries, last was 2012. Pt seen today for follow up, day 3 of levaquin and prednisone, with improvement in sinus/teeth pain. 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 Never started inhaled cayston. \par \par FEV1: 31% (12/15/22),  (0.86 L) 30% (9/29/2022) (0.98 L) 34% (8/5/22), 37% (home crista 10/6/21), 34% on HOME crista (6/2021), 33% (12/2020) (33% (6/2020), 31% (2/6/20), 33% (12/10/19), 31% from (6/25/19) 27%,  (3/7/19) 27%----->FeV1 31% (8/2018), FEV1 29% 3/27/18; 27% (6/25/19).\par FEV1 has been 32 - 33% in 2016; 39% in 2015. 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 coli. \par \par Off prednisone since November, 2019. Off inhaled Antibx since 9/2020\par \par Previously cycling Ceftazidine with Colistin. Inhaled ceftazidime was held due to frequent OP thrush which has improved. Held colistin inh for 3 months due to respiratory intolerance - repeat IgE 500s from 1000s (8/2020), and 1800 (2/2020) - pt also overall feels better being OFF colistin. May be allergic/bronchospastic response to inh colistin.  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 Pt solicited phage therapy at [place].\par \par RESOLVED Bilateral foot paresthesias with dilution of Colistin using 5mL of NaCl to nebulize.\par Had similar episode when patient was on IV Colistin in 2019, which resolved with dose reduction.\par \par ENT:Chronic Sinusitis, Oral thrush for prolonged time resolved when transitioned off of INH Ceftazidime. No need for recent Nystatin. Previously treated with Fluconazole. 12/2021 -ENT scoped and no thrush/sinuses clear\par  Acute on Chronic Sinusitis\par - Cont Dymista \par - Continue sinus rinses\par - Complete Levaquin. Given patient's history of allergic reaction to avelox tabs, will send prednisone 10 mg in case patient develops rash. Recommended Zyrtec and Prednisone 10 mg tomorrow, if pt tolerates can taper off the following day and remain on Zyrtec for duration of levaquin. \par - Advised patient limit physical activity (cycling) to avoid increasing risk for tendon rupture. \par \par ALLERGY - pt had skin testing and is NOT allergic to CIPRO/LEVAQUIN, CLINDAMYCIN. \par CANNOT USE TIW AZITHROMYCIN AS ANTIINFLAMMATORY GIVEN ALL HER ALLERGY HX,\par \par CFTR MODULATOR - Tolerated symdeko, started in 4/2018. Switched to Trikafta since 11/18/19.\par \par LUNG TRANSPLANT - currently following with [place] last visit was tele Jan '21. Underwent stress test on 7/22/20 and ECHO.  Patient aware of [facility] Transplant program prefers to follow with [facility].\par - Transplant: f/u with [place] Dr. [facility] \par - Recommended patient follow up with [facility] overdue for testing ABG/6MW/ECHO\par \par Endocrine - OGTT - wnl 11/2022 repeat annually\par OSTEOPENIA - CALCIUM CITRATE. ON VITAMIN D. CONSIDER BISPHOSPHONATE. \par Vitamin D deficiency - patient on vitamin D 4000 IU. On Calcium and MVI\par - Repeat DEXA 4/2021- OVERDUE. Patient has script. Will complete when she does her mammogram. \par - Continue Ca+ and Vitamin D\par \par Heme - hx anemia previously on Niferix -no longer approved by insurance. Taking OTC iron supplement. \par \par GI - Had Colonoscopy wnl results reported. OTC PPI and H2 blocker with minimal relief of symptoms. Dexilant not covered. Fecal elastase normal. On Pancreatic enzymes although pancreatic sufficient for potential IBS sxs. +weight loss with diet and exercise 4-5 days/day. Pt was given Pretzye to trial, but still remains on Creon. Does not want to trial until new year 2023. \par - Continue Omeprazole 20 mg BID, Gasx PRN\par - Continue Creon. \par \par HCM:\par MAMMO - due\par Hep B 3-dose series complete \par Administered Prevar 13 8/2020\par Pneumovax 23 8/9/2022\par Still needs Tetanus, and shingles vaccine. On [facility] waiting list for shingrix \par Covid 19 Vacc 2/2021 and booster on 8/19/21 and 2nd booster on  9/15/2022\par Influneza Vaccine 2022/2023 UTD\par \par \par F/u 3 months

## 2022-12-15 NOTE — HISTORY OF PRESENT ILLNESS
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 [facility] [de-identified] : This is a 48 y/o female CF pt who was dx'ed CF at 23 y/o, + 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 Sweat test on 10/12/98 was 65.3. Pt never had stool Pancrease testing and she does not have s/s of PI. + chronic sinusitis and 2 sinus surgeries, last was 2012.Was on Kalydeco. Started Symdeko 4/2018 tolerated, and transitioned to Trikafta 11/8/19. \par \par Patient presents today for CF follow up. Patient called office 2 days ago reporting sinus pain, HA's, and pain in her mouth/teeth since Sunday. + PND and sore throat. Home COVID swab that was negative. Pt was started on Prednisone 20 mg x 5 days, Flonase - one spray each nostril BID and Levaquin 750 x 14 days. \par Tolerating medications well, no rash or signs of allergic reaction.  Current symptoms include  HA and sinus pain, significant green nasal discharge especially with sinus rinses. She never started Cayston. \par ACT - Alb/HS/Pulm QD/BID with aerobika and vest with some production of sputum. \par Cough remains at baseline. \par Still on omeprazole, Enzymes, and GasX, doing well. Has not tried Pertzye. Still on Creon, doing well. Reports 1 BM daily, soft. \par Denies fevers, chills, CARDENAS, wheezing, chest tightness, palpitations, LE swelling, hemoptysis. Has not seen [facility] in over a year. \par \par Solicited phage therapy at [place] for her MDR achromobacter problems with processing and still pending results. Patient tried following up multiple times and never heard back. \par She then independently reached to the [place] lab. Sputum sample was sent in August, but no response from the lab. Lab continues to inform us of their small operation and are limited in their ability to treat.\par \par Off prednisone since November, 2019. Off Inhaled Antibx since 9/2020, insurance doesn't cover. (was cycling ceftaz/coli) \par \par Parasthesias resolved with dilution of Colistin using 5mL of NaCl to nebulize. Bilateral foot paresthesias in the past had similar episode when patient was on IV Colistin in 2019, which resolved with dose reduction. OFF Colistin since Fall 2020\par \par ACT: Al/HS BID/Pulm BID\par Using HS 7% and is tolerating well. \par [place] vest and using it 30 minutes BID\par Using Aerobika QD/BID and ACT\par Cayston ON/OFF\par Imipenem/Meropenem inhaled NOT covered. \par ON Stiolto \par Ct 10/7/21- Impression: Bilateral bronchiectasis, scarring and architectural distortion r/t hx of CF is relatively unchanged. No new lung opacities. \par \par PAN-R Achromobacter\par \par ENT: Compliant with sinus rinses. +PND using Dymista daily and Afirin PRN  Denies dysphagia.Oral thrush resolved since stopping inhaled Ceftaz. ENT scoped pt 12/2020 sinuses clear and no signs of thrush. \par \par GI/Nutr: GERD symptoms worsened over last year. +gas/burpring. EGD/EUS unremarkable. Fecal elastase repeated normal 1/2021 on enzymes for IBS. 1 formed BM daily. 33 lb weight loss in last year intentional diet/exercise. \par \par ENDO: Osteopenia on Vit D and Ca DEXA 4/2019, OGTT 11/2022 was WNL. \par \par Transplant: f/u with [facility] last visit 1/2021. Underwent stress test on 7/22/20.

## 2022-12-16 ENCOUNTER — NON-APPOINTMENT (OUTPATIENT)
Age: 47
End: 2022-12-16

## 2022-12-19 NOTE — H&P ADULT - NEGATIVE ENMT SYMPTOMS
In an effort to ensure that our patients [employer], a clinical Team Member has reviewed your chart and identified an opportunity to provide the best care possible. An Outreach Attempt was made to discuss or schedule overdue Preventative or Disease Management screening.     The Outcome of the Outreach was Contact was made, care gap was discussed - see further documentation. Care Gaps include Breast Cancer Screening.       no tinnitus/no post-nasal discharge/no hearing difficulty

## 2022-12-21 LAB — BACTERIA SPT CF RESP CULT: ABNORMAL

## 2023-02-01 LAB — RHODAMINE-AURAMINE STN SPEC: NORMAL

## 2023-02-07 ENCOUNTER — NON-APPOINTMENT (OUTPATIENT)
Age: 48
End: 2023-02-07

## 2023-03-02 ENCOUNTER — NON-APPOINTMENT (OUTPATIENT)
Age: 48
End: 2023-03-02

## 2023-03-09 ENCOUNTER — NON-APPOINTMENT (OUTPATIENT)
Age: 48
End: 2023-03-09

## 2023-03-10 ENCOUNTER — NON-APPOINTMENT (OUTPATIENT)
Age: 48
End: 2023-03-10

## 2023-03-17 ENCOUNTER — NON-APPOINTMENT (OUTPATIENT)
Age: 48
End: 2023-03-17

## 2023-04-25 NOTE — PROGRESS NOTE ADULT - PROBLEM SELECTOR PLAN 1
The Insurance is having technical difficulties they say and it disconnects. The med has been denied but waiting on fax   Recent globus sensation in throat with difficulty swallowing during antibiotic infusion at home concerning for allergic reaction to Bactrim vs colistin vs imipenem - no active symptoms at this time    - c/w Benadryl 50mg q6H, Pepcid 40mg BID, prednisone 40 qD  - s/p scope by ENT 02/15 - no base of tongue edema, no edema, vocal cords mobile and patent  - graded dose challenge with protocol documented per Allergy and Immunology on 02/15 pending availability of MICU bed

## 2023-04-28 ENCOUNTER — APPOINTMENT (OUTPATIENT)
Dept: PULMONOLOGY | Facility: CLINIC | Age: 48
End: 2023-04-28
Payer: SELF-PAY

## 2023-04-28 ENCOUNTER — LABORATORY RESULT (OUTPATIENT)
Age: 48
End: 2023-04-28

## 2023-04-28 ENCOUNTER — NON-APPOINTMENT (OUTPATIENT)
Age: 48
End: 2023-04-28

## 2023-04-28 ENCOUNTER — APPOINTMENT (OUTPATIENT)
Dept: PULMONOLOGY | Facility: CLINIC | Age: 48
End: 2023-04-28
Payer: COMMERCIAL

## 2023-04-28 VITALS
BODY MASS INDEX: 30.22 KG/M2 | SYSTOLIC BLOOD PRESSURE: 121 MMHG | HEART RATE: 66 BPM | WEIGHT: 177 LBS | DIASTOLIC BLOOD PRESSURE: 77 MMHG | HEIGHT: 64 IN

## 2023-04-28 PROCEDURE — 99215 OFFICE O/P EST HI 40 MIN: CPT | Mod: 25

## 2023-04-28 PROCEDURE — ZZZZZ: CPT

## 2023-04-28 PROCEDURE — 94010 BREATHING CAPACITY TEST: CPT

## 2023-04-28 PROCEDURE — 94799 UNLISTED PULMONARY SVC/PX: CPT

## 2023-04-28 RX ORDER — PREDNISONE 10 MG/1
10 TABLET ORAL DAILY
Qty: 10 | Refills: 0 | Status: DISCONTINUED | COMMUNITY
Start: 2022-12-15 | End: 2023-04-28

## 2023-04-28 RX ORDER — LEVOFLOXACIN 750 MG/1
750 TABLET, FILM COATED ORAL DAILY
Qty: 14 | Refills: 0 | Status: DISCONTINUED | COMMUNITY
Start: 2022-12-13 | End: 2023-04-28

## 2023-04-28 RX ORDER — TOBRAMYCIN 300 MG/4ML
300 SOLUTION RESPIRATORY (INHALATION)
Qty: 224 | Refills: 0 | Status: DISCONTINUED | COMMUNITY
Start: 2022-08-10 | End: 2023-04-28

## 2023-04-28 RX ORDER — PREDNISONE 20 MG/1
20 TABLET ORAL DAILY
Qty: 5 | Refills: 0 | Status: DISCONTINUED | COMMUNITY
Start: 2022-12-13 | End: 2023-04-28

## 2023-04-28 RX ORDER — WATER 1 ML/ML
INJECTION INTRAMUSCULAR; INTRAVENOUS; SUBCUTANEOUS
Qty: 600 | Refills: 4 | Status: DISCONTINUED | COMMUNITY
Start: 2020-03-13 | End: 2023-04-28

## 2023-04-28 RX ORDER — AZTREONAM 75 MG/ML
75 KIT INHALATION
Qty: 1 | Refills: 3 | Status: DISCONTINUED | COMMUNITY
Start: 2022-09-29 | End: 2023-04-28

## 2023-04-28 NOTE — PHYSICAL EXAM
[General Appearance - Well Developed] : well developed [Well Groomed] : well groomed [General Appearance - In No Acute Distress] : no acute distress [Normal Conjunctiva] : the conjunctiva exhibited no abnormalities [Normal Oral Mucosa] : normal oral mucosa [Normal Oropharynx] : normal oropharynx [Neck Appearance] : the appearance of the neck was normal [Neck Cervical Mass (___cm)] : no neck mass was observed [Apical Impulse] : the apical impulse was normal [Heart Rate And Rhythm] : heart rate was normal and rhythm regular [Heart Sounds] : normal S1 and S2 [Murmurs] : no murmurs [Respiration, Rhythm And Depth] : normal respiratory rhythm and effort [Exaggerated Use Of Accessory Muscles For Inspiration] : no accessory muscle use [Bowel Sounds] : normal bowel sounds [Abdomen Soft] : soft [Abdomen Tenderness] : non-tender [Abnormal Walk] : normal gait [Skin Color & Pigmentation] : normal skin color and pigmentation [] : no rash [No Focal Deficits] : no focal deficits [Oriented To Time, Place, And Person] : oriented to person, place, and time [Affect] : the affect was normal [Mood] : the mood was normal [FreeTextEntry1] : faint wheeze LML

## 2023-04-28 NOTE — REVIEW OF SYSTEMS
[Nasal Discharge] : nasal discharge [Cough] : cough [see HPI] : see HPI [Negative] : Heme/Lymph [Fever] : no fever [Chills] : no chills [Feeling Poorly] : not feeling poorly [Chest Pain] : no chest pain [Shortness Of Breath] : no shortness of breath [SOB on Exertion] : no shortness of breath during exertion [Abdominal Pain] : no abdominal pain [Vomiting] : no vomiting [Constipation] : no constipation [Diarrhea] : no diarrhea [Heartburn] : no heartburn [Melena] : no melena

## 2023-04-28 NOTE — END OF VISIT
[FreeTextEntry3] : I, Dr. Madisyn Hollis, personally performed the evaluation and management (E/M) services for this established patient who presents today with (a) new problem(s)/exacerbation of (an) existing condition(s).  That E/M includes conducting the examination, assessing all new/exacerbated conditions, and establishing a new plan of care.  Today, Rita Zuniga ACP, was here to observe my evaluation and management services for this new problem/exacerbated condition to be followed going forward.\par \par 45 minutes time spent for patient education related to comorbidities and medications, medical records/labs/radiology reviews, preventative care, documentation, coordination of care.\par

## 2023-04-28 NOTE — ASSESSMENT
[FreeTextEntry1] : ATTENDING ATTESTATION\par \par 48 y/o female CF pt who was dx'ed CF at 23 y/o, + 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 Sweat test on 10/12/98 was 65.3. + chronic sinusitis and 2 sinus surgeries, last was 2012. Pt seen today for follow up  no acute respiratory complaints. 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 Never started inhaled cayston. \par - Chest CT ordered \par - Pt with ALD concern for nocturnal hypoxia and obesity r/o MARK HST ordered \par \par FEV1: 35% (4/28/23), 37% (home crista 4/27/23), 31% (12/15/22),  (0.86 L) 30% (9/29/2022) (0.98 L) 34% (8/5/22), 37% (home crista 10/6/21), 34% on HOME crista (6/2021), 33% (12/2020) (33% (6/2020), 31% (2/6/20), 33% (12/10/19), 31% from (6/25/19) 27%,  (3/7/19) 27%----->FeV1 31% (8/2018), FEV1 29% 3/27/18; 27% (6/25/19).\par FEV1 has been 32 - 33% in 2016; 39% in 2015. 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 coli. \par \par Off prednisone since November, 2019. Off inhaled Antibx since 9/2020\par \par Previously cycling Ceftazidine with Colistin. Inhaled ceftazidime was held due to frequent OP thrush which has improved. Held colistin inh for 3 months due to respiratory intolerance - repeat IgE 500s from 1000s (8/2020), and 1800 (2/2020) - pt also overall feels better being OFF colistin. May be allergic/bronchospastic response to inh colistin.  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 Pt solicited phage therapy at [facility].\par - will contact [facility] \par \par RESOLVED Bilateral foot paresthesias with dilution of Colistin using 5mL of NaCl to nebulize.\par Had similar episode when patient was on IV Colistin in 2019, which resolved with dose reduction.\par \par ENT:Chronic Sinusitis, Oral thrush for prolonged time resolved when transitioned off of INH Ceftazidime. No need for recent Nystatin. Previously treated with Fluconazole. 12/2021 -ENT scoped and no thrush/sinuses clear\par  Acute on Chronic Sinusitis\par - Cont Dymista \par - Continue sinus rinses\par \par ALLERGY - pt had skin testing and is NOT allergic to CIPRO/LEVAQUIN, CLINDAMYCIN. \par CANNOT USE TIW AZITHROMYCIN AS ANTIINFLAMMATORY GIVEN ALL HER ALLERGY HX,\par \par LUNG TRANSPLANT - currently following with [place] last visit was tele Jan '21. Underwent stress test on 7/22/20 and ECHO.  Patient aware of [facility] program prefers to follow with [facility].\par - Transplant: f/u with [facility] Dr. Cunningham \par - Recommended patient follow up with [facility] overdue for testing ABG/6MW/ECHO\par \par Endocrine - OGTT - wnl 11/2022 repeat annually\par OSTEOPENIA - CALCIUM CITRATE. ON VITAMIN D. CONSIDER BISPHOSPHONATE. \par Vitamin D deficiency - patient on vitamin D 4000 IU. On Calcium and MVI\par - Repeat DEXA 4/2021- OVERDUE. Given RX again reinforced importance pt is also postmenopausal\par - Continue Ca+ and Vitamin D, lwbe\par \par Heme - hx anemia previously on Niferix -no longer approved by insurance. Taking OTC iron supplement. \par \par GI - Had Colonoscopy wnl results reported. OTC PPI and H2 blocker with minimal relief of symptoms. Dexilant not covered. Fecal elastase normal. On Pancreatic enzymes although pancreatic sufficient for potential IBS sxs. +weight loss with diet and exercise 4-5 days/day. Pt was given Pretzye to trial, but still remains on Creon. Does not want to trial until new year 2023. \par - Continue Omeprazole 20 mg BID, Gasx PRN\par - Continue Creon. \par \par HCM:\par MAMMO - due\par Hep B 3-dose series complete \par Administered Prevar 13 8/2020\par Pneumovax 23 8/9/2022\par Still needs Tetanus, and shingles vaccine. On [facility] waiting list for shingrix \par Covid 19 Vacc 2/2021 and booster on 8/19/21 and 2nd booster on  9/15/2022\par Influneza Vaccine 2022/2023 UTD\par \par 1. Type of needs assessment: Annual\par 2. If triggered, reason (in the past 3 months) NA\par 3. IPOS screening completed? Y\par 4. Did IPOS or family discussion indicate need for any FURTHER assessment? N\par \par \par Did IPOS screening identify an unmet need? N\par - Documented main problem or concern over the past week/ any other symptoms noted? (Free text)none\par - For physical symptom(s)? Y/N ;N/A\par - For psychological symptom(s)? Y/N ; Comment: denies N\par - For spiritual/ existential needs? N\par - For communication/ information needs? N\par - For practical needs (such as financial, legal or personal)? N\par \par Was there a need for further assessment of any of the following? N\par \par 5. Did any change in management result from this needs assessment (Ex: recommended test, change in therapy, referral without or outside CF care team, or informational or goals discussion)? N/A\par - For physical symptom(s)? Y/N ; Comment: pt to resume exercise to improve exertional stamina. get TTE for dyspnea, \par - For psychological symptom(s)? Y/N ; Comment: denies N\par - For spiritual/ existential needs? N\par - For communication/ information needs? N\par - For practical needs (such as financial, legal or personal)? N\par - Clarification of goals, values, and preferences for treatment? N\par \par F/u 3 months with crista

## 2023-04-28 NOTE — HISTORY OF PRESENT ILLNESS
[Stable] : stable [MSSA] : MSSA [Other: ___] : [unfilled] [___] : the last positive MSSA result was [unfilled] [Last AFB Date: ___] : the AFB was performed  [unfilled] [Last home IV Abx: ___] : The most recent course of home IV antibiotics was [unfilled] [Occasional] : occasional [None] : ~He/She~ has no hemoptysis [Unchanged] : showed no change from previous film [FVC ___] : the FVC was [unfilled] liters [Date: ___] : was performed [unfilled] [FEV1: ___] : the FEV1 was [unfilled] liters [] : gilberto suárez [Good] : good [AFB] : the patient had a MAC negative AFB [de-identified] : followed by Transplant at 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 Sweat test on 10/12/98 was 65.3. Pt never had stool Pancrease testing and she does not have s/s of PI. + chronic sinusitis and 2 sinus surgeries, last was 2012.Was on Kalydeco. Started Symdeko 4/2018 tolerated, and transitioned to Trikafta 11/8/19. Advanced lung disease Fev1 35%. \par \par Patient presents today for CF follow up. Pt has no acute respiratory complaints. Still with productive cough mostly in the AM. Denies fevers, chills, CARDENAS, wheezing, chest tightness, palpitations, LE swelling, hemoptysis. Has not seen [facility] in over a year. \par \par Solicited phage therapy at [place] for her MDR achromobacter problems with processing and still pending results. Patient tried following up multiple times and never heard back. \par She then independently reached to the [facility] lab. Sputum sample was sent in August, but no response from the lab. Lab continues to inform us of their small operation and are limited in their ability to treat.\par \par Off prednisone since November, 2019. Off Inhaled Antibx since 9/2020, insurance doesn't cover. (was cycling ceftaz/coli) \par \par Parasthesias resolved with dilution of Colistin using 5mL of NaCl to nebulize. Bilateral foot paresthesias in the past had similar episode when patient was on IV Colistin in 2019, which resolved with dose reduction. OFF Colistin since Fall 2020\par \par ACT: Al/HS BID/Pulm BID\par Using HS 7% and is tolerating well. \par [place] vest and using it 30 minutes BID\par Using Aerobika QD/BID and ACT\par [place] (never started)\par Imipenem/Meropenem inhaled NOT covered. \par ON Stiolto \par Ct 10/7/21- Impression: Bilateral bronchiectasis, scarring and architectural distortion r/t hx of CF is relatively unchanged. No new lung opacities. \par \par PAN-R Achromobacter\par \par ENT: Compliant with sinus rinses. +PND using Dymista daily and Afirin PRN  Denies dysphagia. Oral thrush resolved since stopping inhaled Ceftaz. ENT scoped pt 12/2020 sinuses clear and no signs of thrush. \par \par GI/Nutr: GERD symptoms controlled, +gas/burpring. EGD/EUS unremarkable. Fecal elastase repeated normal 1/2021 on enzymes for IBS. 1 formed BM daily. Gained some weight recently. PPI BID\par \par ENDO: Osteopenia on Vit D and Ca DEXA 4/2019, OGTT 11/2022 was WNL. \par \par Transplant: f/u with [facility] last visit 1/2021. Underwent stress test on 7/22/20.

## 2023-05-04 ENCOUNTER — NON-APPOINTMENT (OUTPATIENT)
Age: 48
End: 2023-05-04

## 2023-05-04 LAB
25(OH)D3 SERPL-MCNC: 71.3 NG/ML
A FLAVUS AB FLD QL: NEGATIVE
A FUMIGATUS AB FLD QL: NEGATIVE
A FUMIGATUS IGE QN: 7.31 KUA/L
A NIGER AB FLD QL: NEGATIVE
ALBUMIN SERPL ELPH-MCNC: 5 G/DL
ALP BLD-CCNC: 100 U/L
ALT SERPL-CCNC: 14 U/L
ANION GAP SERPL CALC-SCNC: 14 MMOL/L
APPEARANCE: CLEAR
APTT BLD: 33.2 SEC
AST SERPL-CCNC: 19 U/L
BASOPHILS # BLD AUTO: 0.04 K/UL
BASOPHILS NFR BLD AUTO: 0.8 %
BILIRUB SERPL-MCNC: 0.4 MG/DL
BILIRUBIN URINE: NEGATIVE
BLOOD URINE: NEGATIVE
BUN SERPL-MCNC: 13 MG/DL
CALCIUM SERPL-MCNC: 9.7 MG/DL
CHLORIDE SERPL-SCNC: 102 MMOL/L
CHOLEST SERPL-MCNC: 225 MG/DL
CO2 SERPL-SCNC: 24 MMOL/L
COLOR: YELLOW
CREAT SERPL-MCNC: 0.53 MG/DL
CREAT SPEC-SCNC: 33 MG/DL
DEPRECATED A FUMIGATUS IGE RAST QL: 3
EGFR: 113 ML/MIN/1.73M2
EOSINOPHIL # BLD AUTO: 0.14 K/UL
EOSINOPHIL NFR BLD AUTO: 2.7 %
ESTIMATED AVERAGE GLUCOSE: 114 MG/DL
GLUCOSE QUALITATIVE U: NEGATIVE MG/DL
GLUCOSE SERPL-MCNC: 87 MG/DL
HBA1C MFR BLD HPLC: 5.6 %
HCT VFR BLD CALC: 41.9 %
HDLC SERPL-MCNC: 71 MG/DL
HGB BLD-MCNC: 13.1 G/DL
IMM GRANULOCYTES NFR BLD AUTO: 0.2 %
INR PPP: 0.97 RATIO
KETONES URINE: NEGATIVE MG/DL
LDLC SERPL CALC-MCNC: 139 MG/DL
LEUKOCYTE ESTERASE URINE: NEGATIVE
LYMPHOCYTES # BLD AUTO: 1.22 K/UL
LYMPHOCYTES NFR BLD AUTO: 23.5 %
MAN DIFF?: NORMAL
MCHC RBC-ENTMCNC: 30.7 PG
MCHC RBC-ENTMCNC: 31.3 GM/DL
MCV RBC AUTO: 98.1 FL
MENADIONE SERPL-MCNC: 0.68 NG/ML
MICROALBUMIN 24H UR DL<=1MG/L-MCNC: <1.2 MG/DL
MICROALBUMIN/CREAT 24H UR-RTO: NORMAL MG/G
MONOCYTES # BLD AUTO: 0.39 K/UL
MONOCYTES NFR BLD AUTO: 7.5 %
NEUTROPHILS # BLD AUTO: 3.39 K/UL
NEUTROPHILS NFR BLD AUTO: 65.3 %
NITRITE URINE: NEGATIVE
NONHDLC SERPL-MCNC: 154 MG/DL
PH URINE: 6.5
PLATELET # BLD AUTO: 301 K/UL
POTASSIUM SERPL-SCNC: 4.2 MMOL/L
PROT SERPL-MCNC: 7.9 G/DL
PROTEIN URINE: NEGATIVE MG/DL
PT BLD: 11.5 SEC
RBC # BLD: 4.27 M/UL
RBC # FLD: 13.1 %
SODIUM SERPL-SCNC: 140 MMOL/L
SPECIFIC GRAVITY URINE: 1.01
TOTAL IGE SMQN RAST: 257 KU/L
TRIGL SERPL-MCNC: 77 MG/DL
UROBILINOGEN URINE: 0.2 MG/DL
WBC # FLD AUTO: 5.19 K/UL

## 2023-05-05 LAB
A-TOCOPHEROL VIT E SERPL-MCNC: 14.8 MG/L
BACTERIA SPT CF RESP CULT: ABNORMAL
BETA+GAMMA TOCOPHEROL SERPL-MCNC: 1.7 MG/L
VIT A SERPL-MCNC: 43.9 UG/DL

## 2023-06-01 ENCOUNTER — TRANSCRIPTION ENCOUNTER (OUTPATIENT)
Age: 48
End: 2023-06-01

## 2023-06-13 ENCOUNTER — TRANSCRIPTION ENCOUNTER (OUTPATIENT)
Age: 48
End: 2023-06-13

## 2023-06-14 LAB — RHODAMINE-AURAMINE STN SPEC: NORMAL

## 2023-07-27 RX ORDER — TIOTROPIUM BROMIDE AND OLODATEROL 3.124; 2.736 UG/1; UG/1
2.5-2.5 SPRAY, METERED RESPIRATORY (INHALATION) DAILY
Qty: 3 | Refills: 3 | Status: ACTIVE | COMMUNITY
Start: 2020-03-03 | End: 1900-01-01

## 2023-09-11 ENCOUNTER — OUTPATIENT (OUTPATIENT)
Dept: OUTPATIENT SERVICES | Facility: HOSPITAL | Age: 48
LOS: 1 days | End: 2023-09-11
Payer: COMMERCIAL

## 2023-09-11 ENCOUNTER — APPOINTMENT (OUTPATIENT)
Dept: CT IMAGING | Facility: CLINIC | Age: 48
End: 2023-09-11
Payer: COMMERCIAL

## 2023-09-11 DIAGNOSIS — E84.9 CYSTIC FIBROSIS, UNSPECIFIED: ICD-10-CM

## 2023-09-11 PROCEDURE — 71250 CT THORAX DX C-: CPT | Mod: 26,MH

## 2023-09-11 PROCEDURE — 71250 CT THORAX DX C-: CPT

## 2023-09-12 NOTE — PROGRESS NOTE ADULT - PROBLEM SELECTOR PLAN 1
Globus sensation in throat with difficulty swallowing during antibiotic infusion in pt with multiple drug allergies concerning for allergic reaction to bactrim vs colistin vs imipenem.   -Pt does not have hx of reaction to sulfa drugs before.   - c/w benadryl 50mg Q6H, pepcid 40mg BID, prednisone 40 QD  - Will get a graded dose challenge in the MICU per allergy and immunology , will receive a MICU bed when one becomes available  - ENT bedside 2/15 with scope- No Base of tongue edema, Epiglottis sharp,    No masses. No edema, Vocal cords mobile and patent.   - Will Obtain tryptase  min after the onset of the reaction and call ENT for scope if pt complains of globus sensation to r/o airway edema vs. VCD Recent globus sensation in throat with difficulty swallowing during antibiotic infusion at home concerning for allergic reaction to Bactrim vs colistin vs imipenem - no active symptoms at this time    - c/w Benadryl 50mg q6H, Pepcid 40mg BID, prednisone 40 qD  - s/p scope by ENT 2/15 - no base of tongue edema, epiglottis sharp,    no masses, no edema, vocal cords mobile and patent  - Will Obtain tryptase  min after the onset of the reaction and call ENT for scope if pt complains of globus sensation to r/o airway edema vs. VCD  - graded dose challenge with protocol per Allergy and Immunology pending availability of MICU bed Recent globus sensation in throat with difficulty swallowing during antibiotic infusion at home concerning for allergic reaction to Bactrim vs colistin vs imipenem - no active symptoms at this time    - c/w Benadryl 50mg q6H, Pepcid 40mg BID, prednisone 40 qD  - s/p scope by ENT 02/15 - no base of tongue edema, no edema, vocal cords mobile and patent  - graded dose challenge with protocol documented per Allergy and Immunology on 02/15 pending availability of MICU bed negative

## 2023-09-26 ENCOUNTER — RX RENEWAL (OUTPATIENT)
Age: 48
End: 2023-09-26

## 2023-09-26 RX ORDER — SODIUM CHLORIDE SOLN NEBU 7% 7 %
7 NEBU SOLN INHALATION
Qty: 720 | Refills: 3 | Status: ACTIVE | COMMUNITY
Start: 2019-02-27 | End: 1900-01-01

## 2023-09-26 RX ORDER — MONTELUKAST 10 MG/1
10 TABLET, FILM COATED ORAL
Qty: 90 | Refills: 3 | Status: ACTIVE | COMMUNITY
Start: 2021-01-06 | End: 1900-01-01

## 2023-10-03 ENCOUNTER — LABORATORY RESULT (OUTPATIENT)
Age: 48
End: 2023-10-03

## 2023-10-03 ENCOUNTER — NON-APPOINTMENT (OUTPATIENT)
Age: 48
End: 2023-10-03

## 2023-10-03 ENCOUNTER — APPOINTMENT (OUTPATIENT)
Dept: RADIOLOGY | Facility: IMAGING CENTER | Age: 48
End: 2023-10-03
Payer: COMMERCIAL

## 2023-10-03 ENCOUNTER — MED ADMIN CHARGE (OUTPATIENT)
Age: 48
End: 2023-10-03

## 2023-10-03 ENCOUNTER — OUTPATIENT (OUTPATIENT)
Dept: OUTPATIENT SERVICES | Facility: HOSPITAL | Age: 48
LOS: 1 days | End: 2023-10-03
Payer: COMMERCIAL

## 2023-10-03 ENCOUNTER — APPOINTMENT (OUTPATIENT)
Dept: PULMONOLOGY | Facility: CLINIC | Age: 48
End: 2023-10-03
Payer: COMMERCIAL

## 2023-10-03 VITALS
RESPIRATION RATE: 16 BRPM | HEART RATE: 64 BPM | BODY MASS INDEX: 28.27 KG/M2 | WEIGHT: 165.6 LBS | TEMPERATURE: 98 F | HEIGHT: 64 IN | SYSTOLIC BLOOD PRESSURE: 119 MMHG | OXYGEN SATURATION: 97 % | DIASTOLIC BLOOD PRESSURE: 78 MMHG

## 2023-10-03 DIAGNOSIS — K21.9 GASTRO-ESOPHAGEAL REFLUX DISEASE W/OUT ESOPHAGITIS: ICD-10-CM

## 2023-10-03 DIAGNOSIS — M85.80 OTHER SPECIFIED DISORDERS OF BONE DENSITY AND STRUCTURE, UNSPECIFIED SITE: ICD-10-CM

## 2023-10-03 PROCEDURE — 99215 OFFICE O/P EST HI 40 MIN: CPT | Mod: 25

## 2023-10-03 PROCEDURE — 77080 DXA BONE DENSITY AXIAL: CPT

## 2023-10-03 PROCEDURE — 90686 IIV4 VACC NO PRSV 0.5 ML IM: CPT

## 2023-10-03 PROCEDURE — G0008: CPT

## 2023-10-03 PROCEDURE — 77080 DXA BONE DENSITY AXIAL: CPT | Mod: 26

## 2023-10-03 RX ORDER — FERROUS ASPARTO GLYCINATE, IRON, ASCORBIC ACID, FOLIC ACID, CYANOCOBALAMIN, ZINC, SUCCINIC ACID, AND INTRINSIC FACTOR 50; 100; 60; 750; 250; 25; 15; 50; 100 MG/1; MG/1; MG/1; UG/1; UG/1; UG/1; MG/1; MG/1; MG/1
TABLET ORAL DAILY
Qty: 3 | Refills: 0 | Status: DISCONTINUED | COMMUNITY
Start: 2018-07-31 | End: 2023-10-03

## 2023-10-03 RX ORDER — IRON POLYSACCHARIDE COMPLEX 150 MG
150 CAPSULE ORAL
Refills: 0 | Status: ACTIVE | COMMUNITY
Start: 2023-10-03

## 2023-10-03 RX ORDER — NEBULIZER
EACH MISCELLANEOUS
Qty: 1 | Refills: 0 | Status: DISCONTINUED | COMMUNITY
Start: 2022-09-29 | End: 2023-10-03

## 2023-10-03 RX ORDER — NEBULIZER
EACH MISCELLANEOUS
Qty: 1 | Refills: 6 | Status: DISCONTINUED | COMMUNITY
Start: 2022-09-29 | End: 2023-10-03

## 2023-10-13 ENCOUNTER — NON-APPOINTMENT (OUTPATIENT)
Age: 48
End: 2023-10-13

## 2023-12-31 NOTE — DISCHARGE NOTE PROVIDER - NSDCADMDATE_GEN_ALL_CORE_FT
29-Mar-2019 11:04 [facility] at 1715 hours accompanied by FRANSISCO Tenorio and NA.  TF resumed upon arrival to floor.  VSS and dual skin assessment completed.  Patient oriented to unit and room, call light within reach.  All questions answered at this time.

## 2024-01-04 ENCOUNTER — NON-APPOINTMENT (OUTPATIENT)
Age: 49
End: 2024-01-04

## 2024-01-05 ENCOUNTER — APPOINTMENT (OUTPATIENT)
Dept: RADIOLOGY | Facility: CLINIC | Age: 49
End: 2024-01-05
Payer: COMMERCIAL

## 2024-01-05 PROCEDURE — 71046 X-RAY EXAM CHEST 2 VIEWS: CPT

## 2024-01-08 ENCOUNTER — OUTPATIENT (OUTPATIENT)
Dept: OUTPATIENT SERVICES | Facility: HOSPITAL | Age: 49
LOS: 1 days | End: 2024-01-08
Payer: COMMERCIAL

## 2024-01-08 ENCOUNTER — NON-APPOINTMENT (OUTPATIENT)
Age: 49
End: 2024-01-08

## 2024-01-08 ENCOUNTER — APPOINTMENT (OUTPATIENT)
Dept: CT IMAGING | Facility: CLINIC | Age: 49
End: 2024-01-08
Payer: COMMERCIAL

## 2024-01-08 DIAGNOSIS — E84.0 CYSTIC FIBROSIS WITH PULMONARY MANIFESTATIONS: ICD-10-CM

## 2024-01-08 PROCEDURE — 71250 CT THORAX DX C-: CPT | Mod: 26,MH

## 2024-01-08 PROCEDURE — 71250 CT THORAX DX C-: CPT

## 2024-01-16 ENCOUNTER — NON-APPOINTMENT (OUTPATIENT)
Age: 49
End: 2024-01-16

## 2024-02-06 ENCOUNTER — NON-APPOINTMENT (OUTPATIENT)
Age: 49
End: 2024-02-06

## 2024-02-09 ENCOUNTER — NON-APPOINTMENT (OUTPATIENT)
Age: 49
End: 2024-02-09

## 2024-03-07 ENCOUNTER — APPOINTMENT (OUTPATIENT)
Dept: ENDOCRINOLOGY | Facility: CLINIC | Age: 49
End: 2024-03-07
Payer: COMMERCIAL

## 2024-03-07 VITALS
OXYGEN SATURATION: 95 % | HEART RATE: 87 BPM | SYSTOLIC BLOOD PRESSURE: 110 MMHG | HEIGHT: 64 IN | DIASTOLIC BLOOD PRESSURE: 80 MMHG

## 2024-03-07 VITALS — BODY MASS INDEX: 28.15 KG/M2 | WEIGHT: 164 LBS

## 2024-03-07 DIAGNOSIS — M85.80 OTHER SPECIFIED DISORDERS OF BONE DENSITY AND STRUCTURE, UNSPECIFIED SITE: ICD-10-CM

## 2024-03-07 PROCEDURE — 99204 OFFICE O/P NEW MOD 45 MIN: CPT

## 2024-03-07 NOTE — PHYSICAL EXAM
[Alert] : alert [Well Nourished] : well nourished [No Acute Distress] : no acute distress [Well Developed] : well developed [EOMI] : extra ocular movement intact [Normal Sclera/Conjunctiva] : normal sclera/conjunctiva [Normal Oropharynx] : the oropharynx was normal [No Proptosis] : no proptosis [Supple] : the neck was supple [Thyroid Not Enlarged] : the thyroid was not enlarged [Clear to Auscultation] : lungs were clear to auscultation bilaterally [No Respiratory Distress] : no respiratory distress [No Accessory Muscle Use] : no accessory muscle use [Normal Rate] : heart rate was normal [Normal S1, S2] : normal S1 and S2 [Regular Rhythm] : with a regular rhythm [No Edema] : no peripheral edema [Pedal Pulses Normal] : the pedal pulses are present [Normal Bowel Sounds] : normal bowel sounds [Not Tender] : non-tender [Not Distended] : not distended [Normal Anterior Cervical Nodes] : no anterior cervical lymphadenopathy [Soft] : abdomen soft [No Spinal Tenderness] : no spinal tenderness [Normal Posterior Cervical Nodes] : no posterior cervical lymphadenopathy [Spine Straight] : spine straight [No Stigmata of Cushings Syndrome] : no stigmata of Cushings Syndrome [Normal Gait] : normal gait [Normal Strength/Tone] : muscle strength and tone were normal [No Rash] : no rash [No Tremors] : no tremors [Acanthosis Nigricans] : no acanthosis nigricans [Normal Reflexes] : deep tendon reflexes were 2+ and symmetric [Oriented x3] : oriented to person, place, and time

## 2024-03-07 NOTE — HISTORY OF PRESENT ILLNESS
[FreeTextEntry1] : 48 year F here for assessment for osteopenia   Patient with past medical Hx as below, remarkable for cystic fibrosis    Date last DEXA performed: 10/03/2023 Site/Location where last DEXA was performed: Lowest T score: Spine: -2.4, osteopenia; FRAX 10 year fracture risk calculator the patient's ten-year risk of any fracture is 5.3% and the patient's risk of  hip fracture  is  0.7%      History of broken bone as an adult: No Premature menopause (<45 years of age):  No History of Primary hypogonadism: No Corticosteroid Therapy: No Tobacco use: No Alcohol use: No Maternal family history of hip fracture: No Low Body Mass Index (less than 19 kg/m2): No   Currently on Vitamin D3 oral supplementation: yes Currently on Calcium Oral supplementation: yes Almost daily consumption of dairy: Yes   History of prior/current prescribed anabolic/ antiresorptive therapy for osteoporosis: No     History of other common conditions associated with osteoporosis : Malabsorption: No known Hyperthyroidism: No known Chronic Renal Failure: No known Prolonged immobilization: No History of renal calculi/ elevated blood calcium: No known History of autoimmune disease: No known

## 2024-03-12 ENCOUNTER — APPOINTMENT (OUTPATIENT)
Dept: PULMONOLOGY | Facility: CLINIC | Age: 49
End: 2024-03-12
Payer: COMMERCIAL

## 2024-03-12 ENCOUNTER — LABORATORY RESULT (OUTPATIENT)
Age: 49
End: 2024-03-12

## 2024-03-12 ENCOUNTER — NON-APPOINTMENT (OUTPATIENT)
Age: 49
End: 2024-03-12

## 2024-03-12 VITALS
RESPIRATION RATE: 17 BRPM | DIASTOLIC BLOOD PRESSURE: 84 MMHG | HEIGHT: 64 IN | SYSTOLIC BLOOD PRESSURE: 121 MMHG | OXYGEN SATURATION: 95 % | HEART RATE: 79 BPM | BODY MASS INDEX: 28 KG/M2 | WEIGHT: 164 LBS

## 2024-03-12 DIAGNOSIS — Z00.00 ENCOUNTER FOR GENERAL ADULT MEDICAL EXAMINATION W/OUT ABNORMAL FINDINGS: ICD-10-CM

## 2024-03-12 PROCEDURE — 99215 OFFICE O/P EST HI 40 MIN: CPT

## 2024-03-12 RX ORDER — NIRMATRELVIR AND RITONAVIR 300-100 MG
20 X 150 MG & KIT ORAL
Qty: 1 | Refills: 0 | Status: DISCONTINUED | COMMUNITY
Start: 2024-02-03 | End: 2024-03-12

## 2024-03-12 NOTE — HISTORY OF PRESENT ILLNESS
[Stable] : stable [Other: ___] : [unfilled] [MSSA] : MSSA [___] : the last positive MSSA result was [unfilled] [Last AFB Date: ___] : the AFB was performed  [unfilled] [Last home IV Abx: ___] : The most recent course of home IV antibiotics was [unfilled] [Occasional] : occasional [None] : ~He/She~ has no hemoptysis [Unchanged] : showed no change from previous film [FVC ___] : the FVC was [unfilled] liters [Date: ___] : was performed [unfilled] [FVC ___] : the FVC was [unfilled] liters [FEV1: ___] : the FEV1 was [unfilled] liters [] : gilberto suárez [Good] : good

## 2024-03-12 NOTE — PHYSICAL EXAM
[General Appearance - Well Developed] : well developed [Well Groomed] : well groomed [General Appearance - In No Acute Distress] : no acute distress [Normal Oral Mucosa] : normal oral mucosa [Normal Conjunctiva] : the conjunctiva exhibited no abnormalities [Normal Oropharynx] : normal oropharynx [Neck Appearance] : the appearance of the neck was normal [Neck Cervical Mass (___cm)] : no neck mass was observed [Heart Rate And Rhythm] : heart rate was normal and rhythm regular [Heart Sounds] : normal S1 and S2 [Murmurs] : no murmurs [Respiration, Rhythm And Depth] : normal respiratory rhythm and effort [Exaggerated Use Of Accessory Muscles For Inspiration] : no accessory muscle use [Bowel Sounds] : normal bowel sounds [Auscultation Breath Sounds / Voice Sounds] : lungs were clear to auscultation bilaterally [Abdomen Soft] : soft [Abdomen Tenderness] : non-tender [Abnormal Walk] : normal gait [Skin Color & Pigmentation] : normal skin color and pigmentation [Nail Clubbing] : no clubbing of the fingernails [] : no rash [No Focal Deficits] : no focal deficits [Oriented To Time, Place, And Person] : oriented to person, place, and time [Affect] : the affect was normal [Mood] : the mood was normal

## 2024-03-18 LAB
25(OH)D3 SERPL-MCNC: 87.6 NG/ML
A FLAVUS AB FLD QL: NEGATIVE
A FUMIGATUS AB FLD QL: NEGATIVE
A FUMIGATUS IGE QN: 3.26 KUA/L
A NIGER AB FLD QL: NEGATIVE
A-TOCOPHEROL VIT E SERPL-MCNC: 16.2 MG/L
ALBUMIN SERPL ELPH-MCNC: 4.6 G/DL
ALP BLD-CCNC: 107 U/L
ALT SERPL-CCNC: 23 U/L
ANION GAP SERPL CALC-SCNC: 13 MMOL/L
APPEARANCE: CLEAR
APTT BLD: 34.2 SEC
AST SERPL-CCNC: 20 U/L
BASOPHILS # BLD AUTO: 0.05 K/UL
BASOPHILS NFR BLD AUTO: 0.8 %
BETA+GAMMA TOCOPHEROL SERPL-MCNC: 1 MG/L
BILIRUB SERPL-MCNC: 0.6 MG/DL
BILIRUBIN URINE: NEGATIVE
BLOOD URINE: NEGATIVE
BUN SERPL-MCNC: 13 MG/DL
CALCIUM SERPL-MCNC: 9.7 MG/DL
CHLORIDE SERPL-SCNC: 101 MMOL/L
CHOLEST SERPL-MCNC: 216 MG/DL
CO2 SERPL-SCNC: 26 MMOL/L
COLOR: YELLOW
CREAT SERPL-MCNC: 0.62 MG/DL
CREAT SPEC-SCNC: 106 MG/DL
DEPRECATED A FUMIGATUS IGE RAST QL: 2 (ref 0–?)
EGFR: 108 ML/MIN/1.73M2
EOSINOPHIL # BLD AUTO: 0.36 K/UL
EOSINOPHIL NFR BLD AUTO: 5.8 %
ESTIMATED AVERAGE GLUCOSE: 111 MG/DL
GLUCOSE QUALITATIVE U: NEGATIVE MG/DL
GLUCOSE SERPL-MCNC: 93 MG/DL
HBA1C MFR BLD HPLC: 5.5 %
HCT VFR BLD CALC: 39.7 %
HDLC SERPL-MCNC: 70 MG/DL
HGB BLD-MCNC: 12.5 G/DL
IMM GRANULOCYTES NFR BLD AUTO: 0.2 %
INR PPP: 1 RATIO
KETONES URINE: NEGATIVE MG/DL
LDLC SERPL CALC-MCNC: 133 MG/DL
LEUKOCYTE ESTERASE URINE: ABNORMAL
LYMPHOCYTES # BLD AUTO: 1.86 K/UL
LYMPHOCYTES NFR BLD AUTO: 29.9 %
MAN DIFF?: NORMAL
MCHC RBC-ENTMCNC: 30.1 PG
MCHC RBC-ENTMCNC: 31.5 GM/DL
MCV RBC AUTO: 95.7 FL
MENADIONE SERPL-MCNC: 0.61 NG/ML
MICROALBUMIN 24H UR DL<=1MG/L-MCNC: <1.2 MG/DL
MICROALBUMIN/CREAT 24H UR-RTO: NORMAL MG/G
MONOCYTES # BLD AUTO: 0.62 K/UL
MONOCYTES NFR BLD AUTO: 10 %
NEUTROPHILS # BLD AUTO: 3.32 K/UL
NEUTROPHILS NFR BLD AUTO: 53.3 %
NITRITE URINE: NEGATIVE
NONHDLC SERPL-MCNC: 146 MG/DL
PH URINE: 5.5
PLATELET # BLD AUTO: 341 K/UL
POTASSIUM SERPL-SCNC: 4 MMOL/L
PROT SERPL-MCNC: 7.6 G/DL
PROTEIN URINE: NEGATIVE MG/DL
PT BLD: 11.4 SEC
RBC # BLD: 4.15 M/UL
RBC # FLD: 13.9 %
SODIUM SERPL-SCNC: 139 MMOL/L
SPECIFIC GRAVITY URINE: 1.02
TOTAL IGE SMQN RAST: 218 KU/L
TRIGL SERPL-MCNC: 74 MG/DL
UROBILINOGEN URINE: 0.2 MG/DL
VIT A SERPL-MCNC: 43.5 UG/DL
WBC # FLD AUTO: 6.22 K/UL

## 2024-03-20 LAB — BACTERIA SPT CF RESP CULT: ABNORMAL

## 2024-04-03 ENCOUNTER — TRANSCRIPTION ENCOUNTER (OUTPATIENT)
Age: 49
End: 2024-04-03

## 2024-04-03 RX ORDER — NYSTATIN 100000 [USP'U]/ML
100000 SUSPENSION ORAL 3 TIMES DAILY
Qty: 1 | Refills: 0 | Status: ACTIVE | COMMUNITY
Start: 2024-03-18 | End: 1900-01-01

## 2024-04-18 ENCOUNTER — NON-APPOINTMENT (OUTPATIENT)
Age: 49
End: 2024-04-18

## 2024-04-18 RX ORDER — LEVOFLOXACIN 750 MG/1
750 TABLET, FILM COATED ORAL DAILY
Qty: 14 | Refills: 0 | Status: ACTIVE | COMMUNITY
Start: 2024-04-18 | End: 1900-01-01

## 2024-04-19 ENCOUNTER — APPOINTMENT (OUTPATIENT)
Dept: OTOLARYNGOLOGY | Facility: CLINIC | Age: 49
End: 2024-04-19
Payer: COMMERCIAL

## 2024-04-19 VITALS
HEIGHT: 64 IN | DIASTOLIC BLOOD PRESSURE: 79 MMHG | WEIGHT: 160 LBS | SYSTOLIC BLOOD PRESSURE: 111 MMHG | OXYGEN SATURATION: 98 % | HEART RATE: 79 BPM | BODY MASS INDEX: 27.31 KG/M2

## 2024-04-19 DIAGNOSIS — J34.89 OTHER SPECIFIED DISORDERS OF NOSE AND NASAL SINUSES: ICD-10-CM

## 2024-04-19 DIAGNOSIS — J32.9 CHRONIC SINUSITIS, UNSPECIFIED: ICD-10-CM

## 2024-04-19 DIAGNOSIS — E84.0 CYSTIC FIBROSIS WITH PULMONARY MANIFESTATIONS: ICD-10-CM

## 2024-04-19 PROCEDURE — 31231 NASAL ENDOSCOPY DX: CPT

## 2024-04-19 PROCEDURE — 99204 OFFICE O/P NEW MOD 45 MIN: CPT | Mod: 25

## 2024-04-19 RX ORDER — EPINEPHRINE 0.3 MG/.3ML
0.3 INJECTION INTRAMUSCULAR
Qty: 1 | Refills: 0 | Status: ACTIVE | COMMUNITY
Start: 2018-11-26 | End: 1900-01-01

## 2024-04-19 RX ORDER — AZELASTINE HYDROCHLORIDE AND FLUTICASONE PROPIONATE 137; 50 UG/1; UG/1
137-50 SPRAY, METERED NASAL
Qty: 3 | Refills: 2 | Status: COMPLETED | COMMUNITY
Start: 2021-02-26 | End: 2024-04-19

## 2024-04-19 RX ORDER — CETIRIZINE HCL 10 MG
TABLET ORAL DAILY
Refills: 0 | Status: ACTIVE | COMMUNITY

## 2024-04-30 LAB — RHODAMINE-AURAMINE STN SPEC: NORMAL

## 2024-05-03 ENCOUNTER — APPOINTMENT (OUTPATIENT)
Dept: OTOLARYNGOLOGY | Facility: CLINIC | Age: 49
End: 2024-05-03
Payer: COMMERCIAL

## 2024-05-03 DIAGNOSIS — R09.82 POSTNASAL DRIP: ICD-10-CM

## 2024-05-03 DIAGNOSIS — E84.9 CYSTIC FIBROSIS, UNSPECIFIED: ICD-10-CM

## 2024-05-03 DIAGNOSIS — Z87.09 PERSONAL HISTORY OF OTHER DISEASES OF THE RESPIRATORY SYSTEM: ICD-10-CM

## 2024-05-03 DIAGNOSIS — R09.81 NASAL CONGESTION: ICD-10-CM

## 2024-05-03 DIAGNOSIS — B37.0 CANDIDAL STOMATITIS: ICD-10-CM

## 2024-05-03 DIAGNOSIS — J01.90 ACUTE SINUSITIS, UNSPECIFIED: ICD-10-CM

## 2024-05-03 DIAGNOSIS — H93.8X1 OTHER SPECIFIED DISORDERS OF RIGHT EAR: ICD-10-CM

## 2024-05-03 DIAGNOSIS — J34.89 OTHER SPECIFIED DISORDERS OF NOSE AND NASAL SINUSES: ICD-10-CM

## 2024-05-03 DIAGNOSIS — R05.8 OTHER SPECIFIED COUGH: ICD-10-CM

## 2024-05-03 DIAGNOSIS — J32.9 CHRONIC SINUSITIS, UNSPECIFIED: ICD-10-CM

## 2024-05-03 PROCEDURE — 31231 NASAL ENDOSCOPY DX: CPT

## 2024-05-03 PROCEDURE — 99214 OFFICE O/P EST MOD 30 MIN: CPT | Mod: 25

## 2024-05-03 NOTE — HISTORY OF PRESENT ILLNESS
[de-identified] : 48 year old female, history of cystic fibrosis, presents for 2 week follow up for thrush and sinus infection. She had completed her levofloxacin course. Today she reports improvements in sinus pressure. Reports during the mornings continues to have yellow post nasal drip. Denies new fevers, anterior rhinorrhea, nasal congestion.

## 2024-05-03 NOTE — REASON FOR VISIT
[Subsequent Evaluation] : a subsequent evaluation for [FreeTextEntry2] : thrush and sinus infection

## 2024-05-03 NOTE — CONSULT LETTER
[Dear  ___] : Dear  [unfilled], [Consult Letter:] : I had the pleasure of evaluating your patient, [unfilled]. [Please see my note below.] : Please see my note below. [Consult Closing:] : Thank you very much for allowing me to participate in the care of this patient.  If you have any questions, please do not hesitate to contact me. [Sincerely,] : Sincerely, [FreeTextEntry3] : Ayush Lino MD, AGUILA, FACS  Department Otolaryngology Director of [facility] Professor of Otolaryngology,  [facility] [facility]

## 2024-05-03 NOTE — PROCEDURE
End of Shift Note: Medical    What matters most to the patient this shift: Eating solid food    Significant Events: Denies nausea and vomiting. Tolerating clear liquid diet. Orders to advances diet as tolerated, upgraded to full liquids at lunch time. Tolerating well, can upgrade for dinner. CIWA= 0 throughout shift. No c/o pain. K supplemented today, recheck at 1400.     Pain Management: Last Pain Score: Numeric Rating Scale 0-10: 0 (09/22/21 0800)                                      Diet: Liquid Clear Diet      Bowel Function:  Normal  LBM:      Activity:  Activity: Ambulated;Resting in bed (09/22/21 0947)  Mobility Assistive Device:     Level of Assistance:  Level of Assistance: Supervision (09/22/21 0947)  Positioning: Positioning: Semi-fowlers (09/22/21 0926)  LDAs: peripheral IV   Patient's Anticipated Discharge Needs: Home with no anticipated needs (09/21/21 1528)  Expected Discharge Date: TBD  Expected Discharge Time: TBD       [Serial Number: ___] : Serial Number: [unfilled] [Image(s) Captured] : image(s) captured and filed [Video Captured] : video captured and filed [Recalcitrant Symptoms] : recalcitrant symptoms  [Anterior rhinoscopy insufficient to account for symptoms] : anterior rhinoscopy insufficient to account for symptoms [Topical Lidocaine] : topical lidocaine [Oxymetazoline HCl] : oxymetazoline HCl [Flexible Endoscope] : examined with the flexible endoscope [Normal] : the paranasal sinuses had no abnormalities [] : bilateral patent antrostomies [FreeTextEntry6] : Pre-op indication(s): sinusitis Post-op indication(s): sinuses clear Verbal consent obtained from patient. Anterior rhinoscopy insufficient to account for symptoms  Details for procedure:  Scope #: [id number] Type of scope:    flexible fiber optic telescope  Anesthesia and/or vasoconstriction was achieved topically by usin% Lidocaine spray   0.05% Oxymetazoline     Other ______  The following anatomic sites were directly examined in a sequential fashion:  The scope was introduced in the nasal passage between the middle and inferior turbinates to exam the inferior portion of the middle meatus and the fontanelle, as well as the maxillary ostia. Next, the scope was passed medially and posteriorly to the middle turbinates to examine the sphenoethmoid recess and the superior turbinate region.  Upon visualization the finders are as follows:  Nasal Septum:   Normal    Bleeding site cauterized:    Anterior   left   right   Posterior   left   right  Method:   Silver Nitrate   YAG Laser    Electrocautery ______  Right Side:  * Mucosa: Normal * Mucous: Normal * Polyp: Normal * Inferior Turbinate: Normal * Middle Turbinate: Normal * Superior Turbinate: Normal * Inferior Meatus: Normal * Middle Meatus: Normal * Super Meatus: Normal * Sphenoethmoidal Recess: Normal Left Side:  * Mucosa: Normal * Mucous: Normal * Polyp: Normal * Inferior Turbinate: Normal * Middle Turbinate: Normal * Superior Turbinate: Normal * Inferior Meatus: Normal * Middle Meatus: Normal * Super Meatus: Normal * Sphenoethmoidal Recess: Normal The patient tolerated the procedure well without any complications.    [FreeTextEntry5] : secretions in sinuses

## 2024-05-03 NOTE — PHYSICAL EXAM
[Midline] : trachea located in midline position [FreeTextEntry1] : sinusitis, oral thrush, CF [Nasal Endoscopy Performed] : nasal endoscopy was performed, see procedure section for findings [Normal] : no abnormal secretions

## 2024-05-03 NOTE — ADDENDUM
[FreeTextEntry1] : Scribe Attestation: I, Perez Lehman, am scribing for and in the presence of Dr. Ayush Lino in the following sections HISTORY OF PRESENT ILLNESS, PAST MEDICAL/FAMILY/SOCIAL HISTORY; REVIEW OF SYSTEMS; VITAL SIGNS; PHYSICAL EXAM; PROCEDURES; DISCUSSION.   I, Dr. Ayush Lino, personally performed the services described in the documentation, reviewed the documentation recorded by the scribe in my presence, and it accurately and completely records my words and actions.

## 2024-05-06 ENCOUNTER — RX RENEWAL (OUTPATIENT)
Age: 49
End: 2024-05-06

## 2024-05-06 RX ORDER — PANCRELIPASE LIPASE, PANCRELIPASE AMYLASE, AND PANCRELIPASE PROTEASE 21000; 83900; 54700 [USP'U]/1; [USP'U]/1; [USP'U]/1
21000-54700 CAPSULE, DELAYED RELEASE ORAL
Qty: 810 | Refills: 3 | Status: ACTIVE | COMMUNITY
Start: 2022-08-11 | End: 1900-01-01

## 2024-05-09 RX ORDER — AZELASTINE HYDROCHLORIDE AND FLUTICASONE PROPIONATE 137; 50 UG/1; UG/1
137-50 SPRAY, METERED NASAL
Qty: 1 | Refills: 6 | Status: ACTIVE | COMMUNITY
Start: 2024-05-09

## 2024-05-15 ENCOUNTER — APPOINTMENT (OUTPATIENT)
Dept: CT IMAGING | Facility: CLINIC | Age: 49
End: 2024-05-15
Payer: COMMERCIAL

## 2024-05-15 PROCEDURE — 70486 CT MAXILLOFACIAL W/O DYE: CPT

## 2024-05-24 ENCOUNTER — NON-APPOINTMENT (OUTPATIENT)
Age: 49
End: 2024-05-24

## 2024-06-10 ENCOUNTER — RX RENEWAL (OUTPATIENT)
Age: 49
End: 2024-06-10

## 2024-06-10 RX ORDER — ELEXACAFTOR, TEZACAFTOR, AND IVACAFTOR 100-50-75
100-50-75 & 150 KIT ORAL
Qty: 84 | Refills: 6 | Status: ACTIVE | COMMUNITY
Start: 2020-01-24 | End: 1900-01-01

## 2024-06-19 ENCOUNTER — RX RENEWAL (OUTPATIENT)
Age: 49
End: 2024-06-19

## 2024-06-21 NOTE — PATIENT PROFILE ADULT - NSPROPATIENTLACTATING_GEN_A_NUR
Do you have any concerns after your procedure? No    Are you having any problems since you left the hospital or do you have any questions that I can answer for you? No    Did we meet your expectations in responding to any concerns or complaints?  Yes    Did you experience any delays? No    Any fever, nausea/vomiting, pain, or bleeding?   None    Follow-Up: None Needed   
no

## 2024-07-12 ENCOUNTER — RX RENEWAL (OUTPATIENT)
Age: 49
End: 2024-07-12

## 2024-07-19 ENCOUNTER — RX RENEWAL (OUTPATIENT)
Age: 49
End: 2024-07-19

## 2024-08-15 ENCOUNTER — NON-APPOINTMENT (OUTPATIENT)
Age: 49
End: 2024-08-15

## 2024-08-15 ENCOUNTER — APPOINTMENT (OUTPATIENT)
Dept: PULMONOLOGY | Facility: CLINIC | Age: 49
End: 2024-08-15
Payer: COMMERCIAL

## 2024-08-15 VITALS
BODY MASS INDEX: 27.31 KG/M2 | WEIGHT: 160 LBS | SYSTOLIC BLOOD PRESSURE: 121 MMHG | HEIGHT: 64 IN | OXYGEN SATURATION: 96 % | HEART RATE: 78 BPM | RESPIRATION RATE: 18 BRPM | DIASTOLIC BLOOD PRESSURE: 82 MMHG

## 2024-08-15 DIAGNOSIS — E84.9 CYSTIC FIBROSIS, UNSPECIFIED: ICD-10-CM

## 2024-08-15 DIAGNOSIS — Z00.00 ENCOUNTER FOR GENERAL ADULT MEDICAL EXAMINATION W/OUT ABNORMAL FINDINGS: ICD-10-CM

## 2024-08-15 DIAGNOSIS — K21.9 GASTRO-ESOPHAGEAL REFLUX DISEASE W/OUT ESOPHAGITIS: ICD-10-CM

## 2024-08-15 PROCEDURE — 36415 COLL VENOUS BLD VENIPUNCTURE: CPT

## 2024-08-15 PROCEDURE — G2211 COMPLEX E/M VISIT ADD ON: CPT | Mod: NC

## 2024-08-15 PROCEDURE — 99215 OFFICE O/P EST HI 40 MIN: CPT

## 2024-08-16 LAB
25(OH)D3 SERPL-MCNC: 54.9 NG/ML
CHOLEST SERPL-MCNC: 202 MG/DL
HDLC SERPL-MCNC: 63 MG/DL
LDLC SERPL CALC-MCNC: 126 MG/DL
NONHDLC SERPL-MCNC: 139 MG/DL
TRIGL SERPL-MCNC: 73 MG/DL

## 2024-08-16 RX ORDER — ROSUVASTATIN CALCIUM 5 MG/1
5 TABLET, FILM COATED ORAL DAILY
Qty: 90 | Refills: 0 | Status: ACTIVE | COMMUNITY
Start: 2024-08-16 | End: 1900-01-01

## 2024-08-16 RX ORDER — SODIUM CHLORIDE FOR INHALATION 0.9 %
0.9 VIAL, NEBULIZER (ML) INHALATION
Qty: 2 | Refills: 6 | Status: ACTIVE | COMMUNITY
Start: 2024-08-16 | End: 1900-01-01

## 2024-08-16 NOTE — PHYSICAL EXAM
[General Appearance - Well Developed] : well developed [Well Groomed] : well groomed [General Appearance - In No Acute Distress] : no acute distress [Normal Conjunctiva] : the conjunctiva exhibited no abnormalities [Normal Oral Mucosa] : normal oral mucosa [Normal Oropharynx] : normal oropharynx [Neck Appearance] : the appearance of the neck was normal [Neck Cervical Mass (___cm)] : no neck mass was observed [Heart Rate And Rhythm] : heart rate was normal and rhythm regular [Heart Sounds] : normal S1 and S2 [Murmurs] : no murmurs [Respiration, Rhythm And Depth] : normal respiratory rhythm and effort [Exaggerated Use Of Accessory Muscles For Inspiration] : no accessory muscle use [Bowel Sounds] : normal bowel sounds [Abdomen Soft] : soft [Abdomen Tenderness] : non-tender [Abnormal Walk] : normal gait [Nail Clubbing] : no clubbing of the fingernails [Skin Color & Pigmentation] : normal skin color and pigmentation [] : no rash [No Focal Deficits] : no focal deficits [Oriented To Time, Place, And Person] : oriented to person, place, and time [Affect] : the affect was normal [Mood] : the mood was normal [FreeTextEntry1] : sparse crackles in BL Upper lobes

## 2024-08-16 NOTE — ASSESSMENT
[FreeTextEntry1] : ATTENDING ATTESTATION  47 y/o female CF pt who was dx'ed CF at 21 y/o, + 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 Sweat test on 10/12/98 was 65.3. + chronic sinusitis and 2 sinus surgeries, last was 2012, WPW s/p ablation. Pt seen today for follow up with no pulmonary complaints.   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 Never started inhaled cayston.  - Chest CT 1/2024 stable bronchiectasis. (9/2023 CT) showed Improvement in DIOGENES bullae appreciated.  - Home crista performed today slight decrease Fev1 32% (0.88L), 36% (0.98L) - Pt with ALD concern for nocturnal hypoxia and obesity r/o MARK HST ordered - still needs to complete.   FEV1: 32% (8/15/24) home, 36% (3/12/24), 34% (home crista 10/2/23) 35% (4/28/23), 37% (home crista 4/27/23), 31% (12/15/22),  (0.86 L) 30% (9/29/2022) (0.98 L) 34% (8/5/22), 37% (home crista 10/6/21), 34% on HOME crista (6/2021), 33% (12/2020) (33% (6/2020), 31% (2/6/20), 33% (12/10/19), 31% from (6/25/19) 27%,  (3/7/19) 27%----->FeV1 31% (8/2018), FEV1 29% 3/27/18; 27% (6/25/19). FEV1 has been 32 - 33% in 2016; 39% in 2015.  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 coli.   Off prednisone since November, 2019. Off inhaled Antibx since 9/2020  Previously cycling Ceftazidine with Colistin. Inhaled ceftazidime was held due to frequent OP thrush which has improved. Held colistin inh for 3 months due to respiratory intolerance - repeat IgE 500s from 1000s (8/2020), and 1800 (2/2020) - pt also overall feels better being OFF colistin. May be allergic/bronchospastic response to inh colistin.  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 Pt solicited phage therapy at [place]. Contacted [facility] phage lab without followup.   RESOLVED Bilateral foot paresthesias with dilution of Colistin using 5mL of NaCl to nebulize. Had similar episode when patient was on IV Colistin in 2019, which resolved with dose reduction.  ENT:Chronic Sinusitis, Oral thrush for prolonged time resolved when transitioned off of INH Ceftazidime. Previously treated with Fluconazole. 12/2021 -ENT scoped and no thrush/sinuses clear. Had thrush after Levo finished two week course of Nystatin rinse oral cavity appears clear. Scoped 2024 sinuses reportedly clear.  - Cont Dymista and sinus rinses  ALLERGY - pt had skin testing and is NOT allergic to CIPRO/LEVAQUIN, CLINDAMYCIN.  CANNOT USE TIW AZITHROMYCIN AS ANTIINFLAMMATORY GIVEN ALL HER ALLERGY HX,  LUNG TRANSPLANT - currently following with [place] last visit was tele Jan '21. Underwent stress test on 7/22/20 and ECHO.  Patient aware of [facility] Transplant program prefers to follow with [facility]. - last seen 9/2023- following on annual basis.  - Has apt 9/2024 advised to send us copy of testing  Endocrine - OGTT - wnl 11/2022 repeat annually - RX provided  OSTEOPENIA - on Ca Citrate and VitD, saw Endocrine will cont to monitor Vitamin D deficiency - Continue Ca+ 600 and Vitamin D 2000, LWBE - F/u with Endo next year for DEXA (2025) - Vit D labs today   Heme - hx anemia previously on Niferix -no longer approved by insurance. Taking OTC iron supplement.   GI - Had Colonoscopy wnl results reported. OTC PPI and H2 blocker with minimal relief of symptoms. Dexilant not covered. Fecal elastase normal. On Pancreatic enzymes although pancreatic sufficient for potential IBS sxs. +weight loss with diet and exercise 4-5 days/day. Pt was given Pretzye trial, Now on pancreaze.  - Continue Omeprazole 20 mg BID, Gasx PRN - Repeat colonoscopy with EGD due 2024.  - F/u with GI. - F/u RD today   Cardio: WPW s/p ablation  - f/u cardio at [facility]  - Needs ECHO ALD  - FLP today (fasting) discussed potential need for statin therapy with persistent HLD  HCM: MAMMO - due Hep B 3-dose series complete  Administered Prevar 13 8/2020 Pneumovax 23 8/9/2022  PER CDC guidelines pt would not be eligible for additional PNA vaccines can reassess at 63 or with updates Still needs Tetanus, and shingles vaccine. On [facility] waiting list for shingrix  Covid 19 Vacc 2/2021 and booster on 8/19/21 and 2nd booster on 9/15/2022 Influenza Vaccine 2023  f/u 3 months with crista

## 2024-08-16 NOTE — END OF VISIT
[FreeTextEntry3] : I, Dr. Madisyn Hollis, personally performed the evaluation and management (E/M) services for this established patient who presents today with (a) new problem(s)/exacerbation of (an) existing condition(s).  That E/M includes conducting the examination, assessing all new/exacerbated conditions, and establishing a new plan of care.  Today, Rita Zuniga ACP, was here to observe my evaluation and management services for this new problem/exacerbated condition to be followed going forward.  Shasha is doing well from respiratory standpoint, FEV1 from home spirometry is 32% today (baseline range has been in the 30s percent predicted).  Recommend GI follow-up for EGD/colonoscopy, mammogram with GYN, cardiology; repeat TTE.  She has [place] transplant follow-up soon and will perform pulmonary function testing there.  Good adherence to airway clearance therapy.  Repeat blood work, lipid profile, patient fasting today.  Consider starting low-dose statin.  Patient maintaining exercise regimen.  Saw ENT and had clear sinuses. 45 minutes time spent for patient education related to comorbidities and medications, medical records/labs/radiology reviews, preventative care, documentation, coordination of care.

## 2024-08-16 NOTE — END OF VISIT
[FreeTextEntry3] : I, Dr. Madisyn Hollis, personally performed the evaluation and management (E/M) services for this established patient who presents today with (a) new problem(s)/exacerbation of (an) existing condition(s).  That E/M includes conducting the examination, assessing all new/exacerbated conditions, and establishing a new plan of care.  Today, Rita Zuniga ACP, was here to observe my evaluation and management services for this new problem/exacerbated condition to be followed going forward.  Shasha is doing well from respiratory standpoint, FEV1 from home spirometry is 32% today (baseline range has been in the 30s percent predicted).  Recommend GI follow-up for EGD/colonoscopy, mammogram with GYN, cardiology; repeat TTE.  She has [place] transplant follow-up soon and will perform pulmonary function testing there.  Good adherence to airway clearance therapy.  Repeat blood work, lipid profile, patient fasting today.  Consider starting low-dose statin.  Patient maintaining exercise regimen.  Saw ENT and had clear sinuses. 45 minutes time spent for patient education related to comorbidities and medications, medical records/labs/radiology reviews, preventative care, documentation, coordination of care.

## 2024-08-16 NOTE — ASSESSMENT
[FreeTextEntry1] : ATTENDING ATTESTATION  49 y/o female CF pt who was dx'ed CF at 23 y/o, + 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 Sweat test on 10/12/98 was 65.3. + chronic sinusitis and 2 sinus surgeries, last was 2012, WPW s/p ablation. Pt seen today for follow up with no pulmonary complaints.   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 Never started inhaled cayston.  - Chest CT 1/2024 stable bronchiectasis. (9/2023 CT) showed Improvement in DIOGENES bullae appreciated.  - Home crista performed today slight decrease Fev1 32% (0.88L), 36% (0.98L) - Pt with ALD concern for nocturnal hypoxia and obesity r/o MARK HST ordered - still needs to complete.   FEV1: 32% (8/15/24) home, 36% (3/12/24), 34% (home crista 10/2/23) 35% (4/28/23), 37% (home crista 4/27/23), 31% (12/15/22),  (0.86 L) 30% (9/29/2022) (0.98 L) 34% (8/5/22), 37% (home crista 10/6/21), 34% on HOME crista (6/2021), 33% (12/2020) (33% (6/2020), 31% (2/6/20), 33% (12/10/19), 31% from (6/25/19) 27%,  (3/7/19) 27%----->FeV1 31% (8/2018), FEV1 29% 3/27/18; 27% (6/25/19). FEV1 has been 32 - 33% in 2016; 39% in 2015.  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 coli.   Off prednisone since November, 2019. Off inhaled Antibx since 9/2020  Previously cycling Ceftazidine with Colistin. Inhaled ceftazidime was held due to frequent OP thrush which has improved. Held colistin inh for 3 months due to respiratory intolerance - repeat IgE 500s from 1000s (8/2020), and 1800 (2/2020) - pt also overall feels better being OFF colistin. May be allergic/bronchospastic response to inh colistin.  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 Pt solicited phage therapy at [place]. Contacted [facility] phage lab without followup.   RESOLVED Bilateral foot paresthesias with dilution of Colistin using 5mL of NaCl to nebulize. Had similar episode when patient was on IV Colistin in 2019, which resolved with dose reduction.  ENT:Chronic Sinusitis, Oral thrush for prolonged time resolved when transitioned off of INH Ceftazidime. Previously treated with Fluconazole. 12/2021 -ENT scoped and no thrush/sinuses clear. Had thrush after Levo finished two week course of Nystatin rinse oral cavity appears clear. Scoped 2024 sinuses reportedly clear.  - Cont Dymista and sinus rinses  ALLERGY - pt had skin testing and is NOT allergic to CIPRO/LEVAQUIN, CLINDAMYCIN.  CANNOT USE TIW AZITHROMYCIN AS ANTIINFLAMMATORY GIVEN ALL HER ALLERGY HX,  LUNG TRANSPLANT - currently following with [place] last visit was tele Jan '21. Underwent stress test on 7/22/20 and ECHO.  Patient aware of [facility] Transplant program prefers to follow with [facility]. - last seen 9/2023- following on annual basis.  - Has apt 9/2024 advised to send us copy of testing  Endocrine - OGTT - wnl 11/2022 repeat annually - RX provided  OSTEOPENIA - on Ca Citrate and VitD, saw Endocrine will cont to monitor Vitamin D deficiency - Continue Ca+ 600 and Vitamin D 2000, LWBE - F/u with Endo next year for DEXA (2025) - Vit D labs today   Heme - hx anemia previously on Niferix -no longer approved by insurance. Taking OTC iron supplement.   GI - Had Colonoscopy wnl results reported. OTC PPI and H2 blocker with minimal relief of symptoms. Dexilant not covered. Fecal elastase normal. On Pancreatic enzymes although pancreatic sufficient for potential IBS sxs. +weight loss with diet and exercise 4-5 days/day. Pt was given Pretzye trial, Now on pancreaze.  - Continue Omeprazole 20 mg BID, Gasx PRN - Repeat colonoscopy with EGD due 2024.  - F/u with GI. - F/u RD today   Cardio: WPW s/p ablation  - f/u cardio at [facility]  - Needs ECHO ALD  - FLP today (fasting) discussed potential need for statin therapy with persistent HLD  HCM: MAMMO - due Hep B 3-dose series complete  Administered Prevar 13 8/2020 Pneumovax 23 8/9/2022  PER CDC guidelines pt would not be eligible for additional PNA vaccines can reassess at 63 or with updates Still needs Tetanus, and shingles vaccine. On [facility] waiting list for shingrix  Covid 19 Vacc 2/2021 and booster on 8/19/21 and 2nd booster on 9/15/2022 Influenza Vaccine 2023  f/u 3 months with crista

## 2024-08-16 NOTE — HISTORY OF PRESENT ILLNESS
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 Cunningham [de-identified] : This is a 49 y/o female CF pt who was dx'ed CF at 21 y/o, + 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 Sweat test on 10/12/98 was 65.3. Pt never had stool Pancrease testing and she does not have s/s of PI. + chronic sinusitis and 2 sinus surgeries, last was 2012.Was on Kalydeco. Started Symdeko 4/2018 tolerated, and transitioned to Trikafta 11/8/19. Advanced lung disease Fev1 32-36%. PAN-R Achromobacter  Patient presents today for CF follow up. Patient has been doing well no acute respiratory complaints. Has had some increased dry nose since changing her air conditioner at home. Cough/Sputum baseline. Denies fevers, chills, CARDENAS, wheezing, chest tightness, palpitations, LE swelling, hemoptysis. Does ACT BID. Exercises regularly withouth issue. Home crista today was 32% (0.88L) after numerous attempts. Overall patient has been stable. Going to [place] next month. Has apt with [facility] transplant next month.   Solicited phage therapy at [place] for her MDR achromobacter problems with processing and still pending results. Patient tried following up multiple times and never heard back.  She then independently reached to the [place] lab. Sputum sample was sent in August, but no response from the lab. Lab continues to inform us of their small operation and are limited in their ability to treat.  Off prednisone since November, 2019. Off Inhaled Antibx since 9/2020, insurance doesn't cover. (was cycling ceftaz/coli)   Parasthesias resolved with dilution of Colistin using 5mL of NaCl to nebulize. Bilateral foot paresthesias in the past had similar episode when patient was on IV Colistin in 2019, which resolved with dose reduction. OFF Colistin since Fall 2020.   ACT: Al/HS 7% BID/Pulm BID [place] vest Mostly Using Aerobika QD/BID [facility] (never started) Imipenem/Meropenem inhaled NOT covered.  ON Stiolto   Ct 10/7/21- Impression: Bilateral bronchiectasis, scarring and architectural distortion r/t hx of CF is relatively unchanged. No new lung opacities.  CT Chest 9/2023- Impression: Bronchiectasis within both lungs as described above. Finding is consistent with the patient's known history of cystic fibrosis.  ENT: Compliant with sinus rinses. +PND using Dymista daily and Afirin PRN  Denies dysphagia. Oral thrush when on inhaled Ceftaz. Saw ENT was scoped 2024 and had CT sinuses  GI/Nutr: GERD symptoms controlled, +gas/burpring. EGD/EUS unremarkable. Fecal elastase repeated normal 1/2021 on enzymes for IBS. 1 formed BM daily. PPI BID, last COLO around 2019.   ENDO: Osteopenia on Vit D and Ca DEXA 4/2019 and 10/2023, OGTT 11/2022 was WNL. Saw Endocrine   Cardio: WPW s/p ablation at [facility] hasn't seen in a few years. HLD not on therapy  Transplant: f/u with [place] Dr. Cunningham last visit 9/2023. Underwent stress test on 7/22/20.  Echo, 6MWT, ABG completed 9/2023.

## 2024-08-16 NOTE — HISTORY OF PRESENT ILLNESS
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 Cunningham [de-identified] : This is a 49 y/o female CF pt who was dx'ed CF at 21 y/o, + 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 Sweat test on 10/12/98 was 65.3. Pt never had stool Pancrease testing and she does not have s/s of PI. + chronic sinusitis and 2 sinus surgeries, last was 2012.Was on Kalydeco. Started Symdeko 4/2018 tolerated, and transitioned to Trikafta 11/8/19. Advanced lung disease Fev1 32-36%. PAN-R Achromobacter  Patient presents today for CF follow up. Patient has been doing well no acute respiratory complaints. Has had some increased dry nose since changing her air conditioner at home. Cough/Sputum baseline. Denies fevers, chills, CARDENAS, wheezing, chest tightness, palpitations, LE swelling, hemoptysis. Does ACT BID. Exercises regularly withouth issue. Home crista today was 32% (0.88L) after numerous attempts. Overall patient has been stable. Going to [place] next month. Has apt with [facility] transplant next month.   Solicited phage therapy at [place] for her MDR achromobacter problems with processing and still pending results. Patient tried following up multiple times and never heard back.  She then independently reached to the [place] lab. Sputum sample was sent in August, but no response from the lab. Lab continues to inform us of their small operation and are limited in their ability to treat.  Off prednisone since November, 2019. Off Inhaled Antibx since 9/2020, insurance doesn't cover. (was cycling ceftaz/coli)   Parasthesias resolved with dilution of Colistin using 5mL of NaCl to nebulize. Bilateral foot paresthesias in the past had similar episode when patient was on IV Colistin in 2019, which resolved with dose reduction. OFF Colistin since Fall 2020.   ACT: Al/HS 7% BID/Pulm BID [place] vest Mostly Using Aerobika QD/BID [facility] (never started) Imipenem/Meropenem inhaled NOT covered.  ON Stiolto   Ct 10/7/21- Impression: Bilateral bronchiectasis, scarring and architectural distortion r/t hx of CF is relatively unchanged. No new lung opacities.  CT Chest 9/2023- Impression: Bronchiectasis within both lungs as described above. Finding is consistent with the patient's known history of cystic fibrosis.  ENT: Compliant with sinus rinses. +PND using Dymista daily and Afirin PRN  Denies dysphagia. Oral thrush when on inhaled Ceftaz. Saw ENT was scoped 2024 and had CT sinuses  GI/Nutr: GERD symptoms controlled, +gas/burpring. EGD/EUS unremarkable. Fecal elastase repeated normal 1/2021 on enzymes for IBS. 1 formed BM daily. PPI BID, last COLO around 2019.   ENDO: Osteopenia on Vit D and Ca DEXA 4/2019 and 10/2023, OGTT 11/2022 was WNL. Saw Endocrine   Cardio: WPW s/p ablation at [facility] hasn't seen in a few years. HLD not on therapy  Transplant: f/u with [place] Dr. Cunningham last visit 9/2023. Underwent stress test on 7/22/20.  Echo, 6MWT, ABG completed 9/2023.

## 2024-08-21 LAB — BACTERIA SPT CF RESP CULT: NORMAL

## 2024-09-05 NOTE — ASSESSMENT
[FreeTextEntry1] : ATTENDING ATTESTATION\par \par 46 y/o female CF pt who was dx'ed CF at 23 y/o, + 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 Sweat test on 10/12/98 was 65.3. + chronic sinusitis and 2 sinus surgeries, last was 2012. Home spirometry improved with FEV1 37% pred. Pt seen today for follow up, clinically at baseline. +seasonal allergies\par \par PULM:\par ACT: Al/HS 7%/Pulmozyme BID\par [place] vest and using it 30 minutes BID, sometimes Aerobika\par - Cont Stiolto \par - Can increase nebs/act to TID\par - Sputum AFB/CS cups \par - Due for annual Chest Imaging, Ordered Non-Contrast Chest CT (discussed with pt risk of flying with hx of bulla on her lung and if larger could be greater risk for lung collapse)\par - Will have change in insurance Nov1 can try to get inhaled Imipenem or Meropenem \par \par FEV1: 37% (home crista 10/6/21), 34% on HOME crista (6/2021), 33% (12/2020) (33% (6/2020), 31% (2/6/20), 33% (12/10/19), 31% from (6/25/19) 27%,  (3/7/19) 27%----->FeV1 31% (8/2018), FEV1 29% 3/27/18; 27% (6/25/19).\par FEV1 has been 32 - 33% in 2016; 39% in 2015. 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 coli. \par \par Off prednisone since November, 2019. Off inhaled Antibx since 9/2020\par \par Previously cycling Ceftazidine with Colistin. Inhaled ceftazidime was held due to frequent OP thrush which has improved. Held colistin inh for 3 months due to respiratory intolerance - repeat IgE 500s from 1000s (8/2020), and 1800 (2/2020) - pt also overall feels better being OFF colistin. May be allergic/bronchospastic response to inh colistin.  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 Pt solicited phage therapy at [place].\par \par RESOLVED Bilateral foot paresthesias with dilution of Colistin using 5mL of NaCl to nebulize.\par Had similar episode when patient was on IV Colistin in 2019, which resolved with dose reduction.\par \par ENT: Chronic Sinusitis, Oral thrush for prolonged time resolved when transitioned off of INH Ceftazidime. No need for recent Nystatin. Previously treated with Fluconazole. ENT scoped and no thrush/sinuses clear\par - Cont Dymista, sinus rinses \par \par ALLERGY - pt had skin testing and is NOT allergic to CIPRO/LEVAQUIN, CLINDAMYCIN. \par CANNOT USE TIW AZITHROMYCIN AS ANTIINFLAMMATORY GIVEN ALL HER ALLERGY HX,\par \par CFTR MODULATOR - Tolerated symdeko, started in 4/2018. Switched to Trikafta since 11/18/19.\par \par LUNG TRANSPLANT - currently following with [place] last visit was tele Jan '21. Underwent stress test on 7/22/20 and ECHO. \par - Transplant: f/u with [facility] \par - Discussed [facility] Transplant program with pt (wants to think about it)\par \par Endocrine - OGTT - wnl 11/2020 repeat annually\par OSTEOPENIA - CALCIUM CITRATE. ON VITAMIN D. CONSIDER BISPHOSPHONATE. \par Vitamin D deficiency - patient on vitamin D 4000 IU. On Calcium and MVI\par - Repeat DEXA 4/2021 has rx numerous times reminded today \par - Restart Calcium \par \par Heme - hx anemia On Niferix\par \par GI - Had Colonoscopy wnl results reported. OTC PPI and H2 blocker with minimal relief of symptoms. Dexilant not covered. Fecal elastase normal. On Pancreatic enzymes although pancreatic sufficient for potential IBS sxs\par - Continue Omeprazole 20 mg BID, Gasx PRN\par - Cont Enzymes as prescribed \par \par HCM:\par Hep B 3-dose series complete \par Administered Prevar 13 8/2020\par Needs Pneumvax 23 in August 2021 (deferred today would like to wait) \par Still needs Tetanus, and shingles vaccine. On [facility] waiting list for shingrix \par Covid 19 Vacc 2/2021 and booster on 8/19/21 \par FLU utd 9/22/2021\par \par Would like to think about [facility] participation \par \par -F/u 3 months with [facility]
Fabricio PLEITEZ

## 2024-10-01 ENCOUNTER — RX RENEWAL (OUTPATIENT)
Age: 49
End: 2024-10-01

## 2024-10-07 ENCOUNTER — RX RENEWAL (OUTPATIENT)
Age: 49
End: 2024-10-07

## 2024-11-01 ENCOUNTER — NON-APPOINTMENT (OUTPATIENT)
Age: 49
End: 2024-11-01

## 2024-11-14 ENCOUNTER — NON-APPOINTMENT (OUTPATIENT)
Age: 49
End: 2024-11-14

## 2024-11-14 ENCOUNTER — APPOINTMENT (OUTPATIENT)
Dept: PULMONOLOGY | Facility: CLINIC | Age: 49
End: 2024-11-14
Payer: COMMERCIAL

## 2024-11-14 VITALS
TEMPERATURE: 97.2 F | OXYGEN SATURATION: 97 % | WEIGHT: 163.8 LBS | RESPIRATION RATE: 18 BRPM | DIASTOLIC BLOOD PRESSURE: 80 MMHG | HEART RATE: 78 BPM | SYSTOLIC BLOOD PRESSURE: 120 MMHG | HEIGHT: 64 IN | BODY MASS INDEX: 27.96 KG/M2

## 2024-11-14 DIAGNOSIS — E84.0 CYSTIC FIBROSIS WITH PULMONARY MANIFESTATIONS: ICD-10-CM

## 2024-11-14 DIAGNOSIS — Z00.00 ENCOUNTER FOR GENERAL ADULT MEDICAL EXAMINATION W/OUT ABNORMAL FINDINGS: ICD-10-CM

## 2024-11-14 DIAGNOSIS — E84.9 CYSTIC FIBROSIS, UNSPECIFIED: ICD-10-CM

## 2024-11-14 PROCEDURE — G2211 COMPLEX E/M VISIT ADD ON: CPT | Mod: NC

## 2024-11-14 PROCEDURE — 99215 OFFICE O/P EST HI 40 MIN: CPT

## 2024-11-14 NOTE — PROGRESS NOTE ADULT - CONSTITUTIONAL
Received fax from [place] asking to clarify sig on script for meclizine.  Per MD can read 1 TID prn .  Will send updated script.   
detailed exam

## 2024-11-19 LAB — BACTERIA SPT CF RESP CULT: NORMAL

## 2024-11-21 ENCOUNTER — NON-APPOINTMENT (OUTPATIENT)
Age: 49
End: 2024-11-21

## 2024-11-26 ENCOUNTER — APPOINTMENT (OUTPATIENT)
Dept: GASTROENTEROLOGY | Facility: CLINIC | Age: 49
End: 2024-11-26

## 2024-11-26 VITALS
WEIGHT: 160 LBS | SYSTOLIC BLOOD PRESSURE: 110 MMHG | HEART RATE: 78 BPM | HEIGHT: 64 IN | OXYGEN SATURATION: 95 % | BODY MASS INDEX: 27.31 KG/M2 | DIASTOLIC BLOOD PRESSURE: 80 MMHG

## 2024-11-26 DIAGNOSIS — Z12.11 ENCOUNTER FOR SCREENING FOR MALIGNANT NEOPLASM OF COLON: ICD-10-CM

## 2024-11-26 PROCEDURE — 99205 OFFICE O/P NEW HI 60 MIN: CPT

## 2024-11-26 RX ORDER — OMEPRAZOLE 20 MG/1
20 CAPSULE, DELAYED RELEASE ORAL
Qty: 180 | Refills: 1 | Status: ACTIVE | COMMUNITY
Start: 2024-11-26 | End: 1900-01-01

## 2024-11-26 RX ORDER — SODIUM SULFATE, POTASSIUM SULFATE AND MAGNESIUM SULFATE 1.6; 3.13; 17.5 G/177ML; G/177ML; G/177ML
17.5-3.13-1.6 SOLUTION ORAL
Qty: 1 | Refills: 0 | Status: ACTIVE | COMMUNITY
Start: 2024-11-26 | End: 1900-01-01

## 2024-12-09 PROBLEM — Z87.19 HISTORY OF IBS: Status: ACTIVE | Noted: 2024-12-09

## 2025-01-02 ENCOUNTER — RX RENEWAL (OUTPATIENT)
Age: 50
End: 2025-01-02

## 2025-01-09 RX ORDER — POLYETHYLENE GLYCOL 3350 AND ELECTROLYTES WITH LEMON FLAVOR 236; 22.74; 6.74; 5.86; 2.97 G/4L; G/4L; G/4L; G/4L; G/4L
236 POWDER, FOR SOLUTION ORAL
Qty: 1 | Refills: 0 | Status: ACTIVE | COMMUNITY
Start: 2025-01-09 | End: 1900-01-01

## 2025-02-07 ENCOUNTER — NON-APPOINTMENT (OUTPATIENT)
Age: 50
End: 2025-02-07

## 2025-02-07 DIAGNOSIS — J44.9 CHRONIC OBSTRUCTIVE PULMONARY DISEASE, UNSPECIFIED: ICD-10-CM

## 2025-02-11 ENCOUNTER — NON-APPOINTMENT (OUTPATIENT)
Age: 50
End: 2025-02-11

## 2025-02-14 ENCOUNTER — NON-APPOINTMENT (OUTPATIENT)
Age: 50
End: 2025-02-14

## 2025-02-18 ENCOUNTER — NON-APPOINTMENT (OUTPATIENT)
Age: 50
End: 2025-02-18

## 2025-02-19 ENCOUNTER — TRANSCRIPTION ENCOUNTER (OUTPATIENT)
Age: 50
End: 2025-02-19

## 2025-02-19 ENCOUNTER — RESULT REVIEW (OUTPATIENT)
Age: 50
End: 2025-02-19

## 2025-02-19 ENCOUNTER — OUTPATIENT (OUTPATIENT)
Dept: OUTPATIENT SERVICES | Facility: HOSPITAL | Age: 50
LOS: 1 days | End: 2025-02-19
Payer: MEDICARE

## 2025-02-19 ENCOUNTER — APPOINTMENT (OUTPATIENT)
Dept: GASTROENTEROLOGY | Facility: HOSPITAL | Age: 50
End: 2025-02-19

## 2025-02-19 VITALS
DIASTOLIC BLOOD PRESSURE: 68 MMHG | OXYGEN SATURATION: 96 % | TEMPERATURE: 98 F | SYSTOLIC BLOOD PRESSURE: 140 MMHG | HEIGHT: 64 IN | HEART RATE: 102 BPM | WEIGHT: 162.92 LBS | RESPIRATION RATE: 16 BRPM

## 2025-02-19 VITALS
RESPIRATION RATE: 16 BRPM | DIASTOLIC BLOOD PRESSURE: 57 MMHG | HEART RATE: 73 BPM | OXYGEN SATURATION: 100 % | SYSTOLIC BLOOD PRESSURE: 105 MMHG

## 2025-02-19 DIAGNOSIS — K21.9 GASTRO-ESOPHAGEAL REFLUX DISEASE WITHOUT ESOPHAGITIS: ICD-10-CM

## 2025-02-19 DIAGNOSIS — E84.9 CYSTIC FIBROSIS, UNSPECIFIED: ICD-10-CM

## 2025-02-19 DIAGNOSIS — Z12.11 ENCOUNTER FOR SCREENING FOR MALIGNANT NEOPLASM OF COLON: ICD-10-CM

## 2025-02-19 PROCEDURE — 45380 COLONOSCOPY AND BIOPSY: CPT

## 2025-02-19 PROCEDURE — 88305 TISSUE EXAM BY PATHOLOGIST: CPT

## 2025-02-19 PROCEDURE — 45380 COLONOSCOPY AND BIOPSY: CPT | Mod: PT

## 2025-02-19 PROCEDURE — 43239 EGD BIOPSY SINGLE/MULTIPLE: CPT

## 2025-02-19 PROCEDURE — 88305 TISSUE EXAM BY PATHOLOGIST: CPT | Mod: 26

## 2025-02-19 DEVICE — KIT A-LINE 1LUM 20G X 12CM SAFE KIT: Type: IMPLANTABLE DEVICE | Status: FUNCTIONAL

## 2025-02-19 DEVICE — KIT CVC 2LUM MAC 9FR CHG: Type: IMPLANTABLE DEVICE | Status: FUNCTIONAL

## 2025-02-19 DEVICE — CATH THERMODIL PACE 7.5FR: Type: IMPLANTABLE DEVICE | Status: FUNCTIONAL

## 2025-02-19 RX ORDER — LIPASE/PROTEASE/AMYLASE 10K-37.5K
21000 CAPSULE,DELAYED RELEASE (ENTERIC COATED) ORAL
Refills: 0 | DISCHARGE

## 2025-02-19 NOTE — ASU PATIENT PROFILE, ADULT - NSICDXPASTMEDICALHX_GEN_ALL_CORE_FT
PAST MEDICAL HISTORY:  CF (Cystic Fibrosis)     Deviated septum     Epigastric pain     History of chronic sinusitis     History of Octavia-Parkinson-White (WPW) syndrome s/p ablation 12/2019 [facility]

## 2025-02-19 NOTE — ASU PATIENT PROFILE, ADULT - FALL HARM RISK - HARM RISK INTERVENTIONS

## 2025-02-19 NOTE — PRE-ANESTHESIA EVALUATION ADULT - HEART RATE (BEATS/MIN)
102 Hydroxyzine Pregnancy And Lactation Text: This medication is not safe during pregnancy and should not be taken. It is also excreted in breast milk and breast feeding isn't recommended.

## 2025-02-19 NOTE — PRE PROCEDURE NOTE - PRE PROCEDURE EVALUATION
Attending Physician:              Geovanni Benson MD MSEd    Indication for Procedure         CF, screening       PAST MEDICAL & SURGICAL HISTORY:  CF (Cystic Fibrosis)      Deviated septum      History of Octavia-Parkinson-White (WPW) syndrome  s/p ablation 2019 [facility]      Epigastric pain      History of chronic sinusitis       (Normal Spontaneous Vaginal Delivery)      sinus surgery- 2011,             See Allscripts Note for further details  ALLERGIES:  clindamycin (Hives)  moxifloxacin (Rash)  macrolide antibiotics (Other)  adhesives (Rash)  fluoroquinolone antibiotics (Unknown)  Zithromax Z-Ivan (Hives)  minocycline (Rash)  levofloxacin (Hives)  penicillin (Hives)  chlorhexidine containing compounds (Rash)    HOME MEDICATIONS:  albuterol:   Dymista 137 mcg-50 mcg/inh nasal spray: 1 spray(s) nasal 2 times a day  ivacaftor 150 mg oral tablet: 1 tab(s) orally every 12 hours  montelukast 10 mg oral tablet: 1 tab(s) orally once a day  omeprazole 20 mg oral delayed release capsule: 1 cap(s) orally once a day  Pancrease oral delayed release capsule: 21,000 unit(s) orally 3 times a day  Pulmozyme 2.5 mg/2.5 mL inhalation solution: 2.5 milliliter(s) inhaled once a day  see medication reconcilation form in chart:   sodium chloride: 2 times a day  Stiolto Respimat 28 ACT 2.5 mcg-2.5 mcg/inh inhalation aerosol: 2 puff(s) inhaled every 24 hours  ZyrTEC 10 mg oral tablet:       See Allscripts Note for further details    AICD/PPM: [ ] yes   [x ] no    PERTINENT LAB DATA:                      PHYSICAL EXAMINATION:    Height (cm): 162.6  Weight (kg): 73.9  BMI (kg/m2): 28  BSA (m2): 1.79T(C): --  HR: --  BP: --  RR: --  SpO2: --    Constitutional: NAD  HEENT: PERRLA, EOMI,    Neck:  No JVD  Respiratory: CTAB/L  Cardiovascular: S1 and S2  Gastrointestinal: BS+, soft, NT/ND  Extremities: No peripheral edema  Neurological: A/O x 3, no focal deficits  Psychiatric: Normal mood, normal affect  Skin: No rashes    ASA Class: I [ ]  II [x ]  III [ ]  IV [ ]    COMMENTS:    The patient is a suitable candidate for the planned procedure unless box checked [ ]  No, explain:

## 2025-02-19 NOTE — ASU DISCHARGE PLAN (ADULT/PEDIATRIC) - FINANCIAL ASSISTANCE
[facility] provides services at a reduced cost to those who are determined to be eligible through [facility]’s financial assistance program. Information regarding [facility]’s financial assistance program can be found by going to [email or web address] or by calling [phone].

## 2025-02-25 LAB — SURGICAL PATHOLOGY STUDY: SIGNIFICANT CHANGE UP

## 2025-02-27 ENCOUNTER — TRANSCRIPTION ENCOUNTER (OUTPATIENT)
Age: 50
End: 2025-02-27

## 2025-02-28 ENCOUNTER — TRANSCRIPTION ENCOUNTER (OUTPATIENT)
Age: 50
End: 2025-02-28

## 2025-03-04 ENCOUNTER — RX RENEWAL (OUTPATIENT)
Age: 50
End: 2025-03-04

## 2025-03-11 ENCOUNTER — TRANSCRIPTION ENCOUNTER (OUTPATIENT)
Age: 50
End: 2025-03-11

## 2025-03-12 ENCOUNTER — NON-APPOINTMENT (OUTPATIENT)
Age: 50
End: 2025-03-12

## 2025-03-13 ENCOUNTER — APPOINTMENT (OUTPATIENT)
Dept: PULMONOLOGY | Facility: CLINIC | Age: 50
End: 2025-03-13
Payer: MEDICARE

## 2025-03-13 ENCOUNTER — APPOINTMENT (OUTPATIENT)
Dept: PULMONOLOGY | Facility: CLINIC | Age: 50
End: 2025-03-13

## 2025-03-13 VITALS
OXYGEN SATURATION: 99 % | HEART RATE: 70 BPM | BODY MASS INDEX: 27.31 KG/M2 | WEIGHT: 160 LBS | SYSTOLIC BLOOD PRESSURE: 126 MMHG | HEIGHT: 64 IN | DIASTOLIC BLOOD PRESSURE: 84 MMHG

## 2025-03-13 DIAGNOSIS — E84.9 CYSTIC FIBROSIS, UNSPECIFIED: ICD-10-CM

## 2025-03-13 DIAGNOSIS — J15.69 PNEUMONIA. DUE TO OTHER GRAM-NEGATIVE BACTERIA: ICD-10-CM

## 2025-03-13 DIAGNOSIS — Z00.00 ENCOUNTER FOR GENERAL ADULT MEDICAL EXAMINATION W/OUT ABNORMAL FINDINGS: ICD-10-CM

## 2025-03-13 PROCEDURE — 94726 PLETHYSMOGRAPHY LUNG VOLUMES: CPT

## 2025-03-13 PROCEDURE — 94729 DIFFUSING CAPACITY: CPT

## 2025-03-13 PROCEDURE — 99205 OFFICE O/P NEW HI 60 MIN: CPT | Mod: 25

## 2025-03-13 PROCEDURE — 99215 OFFICE O/P EST HI 40 MIN: CPT | Mod: 25

## 2025-03-13 PROCEDURE — ZZZZZ: CPT

## 2025-03-13 PROCEDURE — 94010 BREATHING CAPACITY TEST: CPT

## 2025-03-13 RX ORDER — MUCUS CLEARING DEVICE
EACH MISCELLANEOUS
Qty: 1 | Refills: 0 | Status: ACTIVE | COMMUNITY
Start: 2025-03-13 | End: 1900-01-01

## 2025-03-14 ENCOUNTER — NON-APPOINTMENT (OUTPATIENT)
Age: 50
End: 2025-03-14

## 2025-03-19 LAB
25(OH)D3 SERPL-MCNC: 43.2 NG/ML
A FLAVUS AB FLD QL: NEGATIVE
A FUMIGATUS AB FLD QL: NEGATIVE
A NIGER AB FLD QL: NEGATIVE
A-TOCOPHEROL VIT E SERPL-MCNC: 16.3 MG/L
ALBUMIN SERPL ELPH-MCNC: 4.5 G/DL
ALP BLD-CCNC: 106 U/L
ALT SERPL-CCNC: 13 U/L
ANION GAP SERPL CALC-SCNC: 14 MMOL/L
APTT BLD: 35.1 SEC
AST SERPL-CCNC: 17 U/L
BACTERIA SPT CF RESP CULT: ABNORMAL
BASOPHILS # BLD AUTO: 0.06 K/UL
BASOPHILS NFR BLD AUTO: 0.7 %
BETA+GAMMA TOCOPHEROL SERPL-MCNC: 1.4 MG/L
BILIRUB SERPL-MCNC: 0.6 MG/DL
BUN SERPL-MCNC: 14 MG/DL
CALCIUM SERPL-MCNC: 9.5 MG/DL
CHLORIDE SERPL-SCNC: 103 MMOL/L
CHOLEST SERPL-MCNC: 210 MG/DL
CO2 SERPL-SCNC: 24 MMOL/L
CREAT SERPL-MCNC: 0.52 MG/DL
EGFRCR SERPLBLD CKD-EPI 2021: 112 ML/MIN/1.73M2
EOSINOPHIL # BLD AUTO: 0.2 K/UL
EOSINOPHIL NFR BLD AUTO: 2.4 %
ESTIMATED AVERAGE GLUCOSE: 111 MG/DL
GLUCOSE SERPL-MCNC: 85 MG/DL
HBA1C MFR BLD HPLC: 5.5 %
HCT VFR BLD CALC: 40.3 %
HDLC SERPL-MCNC: 72 MG/DL
HGB BLD-MCNC: 13 G/DL
IMM GRANULOCYTES NFR BLD AUTO: 0.2 %
INR PPP: 0.92 RATIO
LDLC SERPL CALC-MCNC: 127 MG/DL
LYMPHOCYTES # BLD AUTO: 1.85 K/UL
LYMPHOCYTES NFR BLD AUTO: 22.4 %
MAN DIFF?: NORMAL
MCHC RBC-ENTMCNC: 30.7 PG
MCHC RBC-ENTMCNC: 32.3 G/DL
MCV RBC AUTO: 95.3 FL
MONOCYTES # BLD AUTO: 0.6 K/UL
MONOCYTES NFR BLD AUTO: 7.3 %
NEUTROPHILS # BLD AUTO: 5.54 K/UL
NEUTROPHILS NFR BLD AUTO: 67 %
NONHDLC SERPL-MCNC: 138 MG/DL
PLATELET # BLD AUTO: 327 K/UL
POTASSIUM SERPL-SCNC: 4.2 MMOL/L
PROT SERPL-MCNC: 7.5 G/DL
PT BLD: 10.9 SEC
RBC # BLD: 4.23 M/UL
RBC # FLD: 12.9 %
SODIUM SERPL-SCNC: 141 MMOL/L
TRIGL SERPL-MCNC: 58 MG/DL
VIT A SERPL-MCNC: 40.7 UG/DL
WBC # FLD AUTO: 8.27 K/UL

## 2025-03-20 LAB — MENADIONE SERPL-MCNC: 0.78 NG/ML

## 2025-03-22 LAB — RHODAMINE-AURAMINE STN SPEC: NORMAL

## 2025-03-26 ENCOUNTER — APPOINTMENT (OUTPATIENT)
Dept: PEDIATRIC PULMONARY CYSTIC FIB | Facility: CLINIC | Age: 50
End: 2025-03-26

## 2025-03-27 ENCOUNTER — NON-APPOINTMENT (OUTPATIENT)
Age: 50
End: 2025-03-27

## 2025-05-16 ENCOUNTER — RX RENEWAL (OUTPATIENT)
Age: 50
End: 2025-05-16

## 2025-06-16 ENCOUNTER — RX RENEWAL (OUTPATIENT)
Age: 50
End: 2025-06-16

## 2025-06-19 ENCOUNTER — TRANSCRIPTION ENCOUNTER (OUTPATIENT)
Age: 50
End: 2025-06-19

## 2025-06-20 ENCOUNTER — TRANSCRIPTION ENCOUNTER (OUTPATIENT)
Age: 50
End: 2025-06-20

## 2025-07-26 NOTE — ASU DISCHARGE PLAN (ADULT/PEDIATRIC) - NSDISCH_ACTIVITY_ENDO_ALL_CORE_FT
Case Management Assessment     PCP: Moni Aguilar MD; prefers PM appointments  Pharmacy:   [id number]  [place]UILAR  [address] [facility] [address] [facility] [address] [facility]  [address] [place] [address] [place] [address]  Phone: [phone] Fax: [phone]    [id number]  [place] [address] AT [facility] [address] [facility]  [address] [place] [id number]  Phone: [phone] Fax: [phone]     Patient Arrived From: Home with parent and SO  Existing Help at Home: [facility]  Barriers to Discharge: None    Discharge Plan:    A. Home with family; follow-up   B. TBD    Independent at home with SO and parent, who assist if needed; no assist usually needed; no current medical services or equipment; no discharge needs anticipated at this time.     Final diagnoses:  [R10.9] Abdominal pain  [K92.0] Hematemesis with nausea (Primary)          07/26/25 1439   Discharge Assessment   Assessment Type Discharge Planning Assessment   Confirmed/corrected address, phone number and insurance Yes   Confirmed Demographics Correct on Facesheet   Source of Information patient;health record   Communicated WENCESLAO with patient/caregiver Date not available/Unable to determine   People in Home significant other;parent(s)   Name(s) of People in Home Walt-mother; [facility]   Facility Arrived From: Home   Do you expect to return to your current living situation? Yes   Do you have help at home or someone to help you manage your care at home? Yes   Who are your caregiver(s) and their phone number(s)? [facility]: [phone]   Prior to hospitilization cognitive status: Alert/Oriented   Current cognitive status: Alert/Oriented   Walking or Climbing Stairs Difficulty no   Dressing/Bathing Difficulty no   Home Accessibility wheelchair accessible   Home Layout Able to live on 1st floor   Equipment Currently Used at Home none   Readmission within 30 days? No   Patient currently being followed  by outpatient case management? No   Do you currently have service(s) that help you manage your care at home? No   Do you take prescription medications? Yes   Do you have prescription coverage? Yes   Do you have any problems affording any of your prescribed medications? No   Is the patient taking medications as prescribed? yes   Who is going to help you get home at discharge? Jordy   How do you get to doctors appointments? car, drives self   Are you on dialysis? No   Do you take coumadin? No   Discharge Plan A Home with family  (Follow-ups)   Discharge Plan B Other  (TBD)   DME Needed Upon Discharge  other (see comments)  (TBD)   Discharge Plan discussed with: Patient   Transition of Care Barriers None     SW Role explained to patient; two patient identifiers recognized; SW contact information placed on Communication board. Discussed patient managing health care at home and discussed discharge plans A and B; determined who would be helping patient at home with recovery: Jordy will help with recovery at home.   As you may have been given sedative drugs and medications, you should not drive or operate heavy machinery the next 24 hours. You should avoid any heavy lifting, straining or running today. To the extent possible, defer any important decisions for the next 24 hours.

## 2025-08-04 ENCOUNTER — RX RENEWAL (OUTPATIENT)
Age: 50
End: 2025-08-04

## 2025-08-11 ENCOUNTER — RX RENEWAL (OUTPATIENT)
Age: 50
End: 2025-08-11

## 2025-09-09 ENCOUNTER — RX RENEWAL (OUTPATIENT)
Age: 50
End: 2025-09-09

## 2025-09-18 ENCOUNTER — APPOINTMENT (OUTPATIENT)
Dept: PULMONOLOGY | Facility: CLINIC | Age: 50
End: 2025-09-18

## (undated) DEVICE — BRUSH COLONOSCOPY CYTOLOGY

## (undated) DEVICE — POLY TRAP ETRAP

## (undated) DEVICE — SYR LUER LOK 50CC

## (undated) DEVICE — CATH IV SAFE BC 22G X 1" (BLUE)

## (undated) DEVICE — FOLEY HOLDER STATLOCK 2 WAY ADULT

## (undated) DEVICE — SYR ALLIANCE II INFLATION 60ML

## (undated) DEVICE — SUCTION YANKAUER NO CONTROL VENT

## (undated) DEVICE — TUBING SUCTION 20FT

## (undated) DEVICE — BITE BLOCK ADULT 20 X 27MM (GREEN)

## (undated) DEVICE — SOL INJ NS 0.9% 500ML 2 PORT

## (undated) DEVICE — PACK IV START WITH CHG

## (undated) DEVICE — CATH IV SAFE BC 20G X 1.16" (PINK)

## (undated) DEVICE — FORCEP RADIAL JAW 4 JUMBO 2.8MM 3.2MM 240CM ORANGE DISP

## (undated) DEVICE — TUBING SUCTION CONN 6FT STERILE

## (undated) DEVICE — BIOPSY FORCEP RADIAL JAW 4 STANDARD WITH NEEDLE

## (undated) DEVICE — CLAMP BX HOT RAD JAW 3

## (undated) DEVICE — TUBING IV SET GRAVITY 3Y 100" MACRO

## (undated) DEVICE — ELCTR GROUNDING PAD ADULT COVIDIEN

## (undated) DEVICE — SENSOR O2 FINGER ADULT

## (undated) DEVICE — IRRIGATOR BIO SHIELD

## (undated) DEVICE — BALLOON US ENDO